# Patient Record
Sex: MALE | Race: BLACK OR AFRICAN AMERICAN | NOT HISPANIC OR LATINO | Employment: OTHER | URBAN - METROPOLITAN AREA
[De-identification: names, ages, dates, MRNs, and addresses within clinical notes are randomized per-mention and may not be internally consistent; named-entity substitution may affect disease eponyms.]

---

## 2020-02-21 ENCOUNTER — HISTORICAL (OUTPATIENT)
Dept: ADMINISTRATIVE | Facility: HOSPITAL | Age: 55
End: 2020-02-21

## 2022-04-10 ENCOUNTER — HISTORICAL (OUTPATIENT)
Dept: ADMINISTRATIVE | Facility: HOSPITAL | Age: 57
End: 2022-04-10

## 2022-04-26 VITALS
DIASTOLIC BLOOD PRESSURE: 77 MMHG | WEIGHT: 287.94 LBS | SYSTOLIC BLOOD PRESSURE: 128 MMHG | BODY MASS INDEX: 45.19 KG/M2 | HEIGHT: 67 IN

## 2022-05-03 NOTE — HISTORICAL [facility] CERNER
This is a historical note converted from Scott. Formatting and pictures may have been removed.  Please reference Scott for original formatting and attached multimedia. Chief Complaint  Referral Rt knee OA  History of Present Illness  55 Years??Male?non-smoker?p[facility]?for?initial visit?for?right?knee pain.? Patient points to ?diffusely.  Onset: ?insidious over years  Current pain level: 7/10 (rated as?moderate) ?medication not helping?. Quality described as aching and throbbing radiating down to his feet.  Modifying Factors: ?worse with/after activity;?unchanged with rest;??stiffness after immobilization??stiffness improved with less than 30 minutes of activity  Previous treatment:?conservative treatment for at least 3 months with HEP/medications, CSI x 4 over the last 2 years, last CSI 1/09/2020, [facility] and [facility] to lose weight to <250lbs. Patient did PT and aquatic exercises in 2016 with no improvement of pain.  Previous injuries:?remote history of trauma?with hx of meniscus tears on the right knee  Associated Symptoms:?crepitus/grinding; ?numbness/tingling radiating down?the feet;??no swelling;?no skin changes;?no weakness;?moderate decrease in ROM  Activity:?sedentary; full ADLs;?pain occasionally limits daily activity (moderate)  Family History:?family history of arthritis  Review of Systems  General: No? change in appetite, thirst, fever or chills.?  HEENT: No headache, blurred vision, runny nose or sore throat  Cardiology : No chest pain, palpitations or shortness of breath.?  GI: No nausea or vomiting, diarrhea or constipation.?  Heme: No pallor, bruising or bleeding.?  MS: Per HPI  Neurology: ?No headache or loss of consciousness.?  Psych: No depression, anxiety, or mood swing.  Physical Exam  General: well developed; well nourished; cooperative  PSYCH: alert and oriented with?appropriate mood and affect  SKIN: inspection  and palpation of skin and soft tissue normal; no scars noted on upper/lower extremities  CV: vascular integrity noted; +2 symmetrical pulses, no edema  NEURO: sensation intact by light touch; DTRs +2 bilateral and symmetrical  LYMPH: no LAD noted  ?   MSK:right knee  Inspection:?limping;??partial weight bearing;??normal?alignment;??no swelling;?no?erythema;?no?bruising;?atrophy/deconditioning of quadriceps  Palpation:?non-tender; ?Crepitus:?Positive  ROM:?  Active Extension/Flexion (0-140):?0-100  Passive?Extension/Flexion (0-140):?0-105  Strength:? Flexion??5/5, Extension??5/5  Special Tests:  Ballotable Effusion: ?Negative; Fluid Wave:?Negative  Anterior Drawer:Negative;? Lachman:?Negative;? Pivot Shift:Negative  Posterior Sag Sign:?Negative;? Posterior Drawer:?Negative; Dial Test:?not tested  Varus Stress:?LCL stable at 0 and 30 degrees  Valgus Stress:?MCL?stable at 0 and 30 degrees  Patellar Grind: ?Positive  Romina:?Negative?; Apleys Compression:?Negative; Thessaly: ?Negative  Noble Compression:not tested; Geoffrey:not tested  Neurovascular:?Intact; 2+?distal pulse, sensation intact to light touch  ?  Assessment/Plan  1.?Primary osteoarthritis of right knee?M17.11  DX: ?right?knee osteoarthritis  Discussed with patient diagnosis and treatment recommendations.? Handout given.  Imaging:?radiological studies ordered and independently reviewed with?moderate narrowing?lateral joint?space with diffused?subchrondral sclerosis; discussed with patient; pending radiologist interpretation  Treatment plan: conservative treatment to include oral glucosamine 1500 mg/day; topical capsaicin and topical NSAID as needed; hot or cold therapy; adequate vitamin C/D intake  Procedure:?discussed CSI vs VS injections as treatment options but?CSI does?not last?more than?1 month.  Activity:?Activity as tolerated; HEP to included aerobic conditioning with non-painful activity and ROM/STG exercises; proper footwear; assistive device to  avoid limping  Therapy:?formal PT/OT ordered  Medication:?OTC or?prescription?NSAIDs; medication precautions given  RTC: 1 month, patient is instructed to attend PT?prior to next appointment. ?  Additional work-up:?none at this time.  Ordered:  naproxen, 500 mg = 1 tab(s), Oral, BID, # 60 tab(s), 1 Refill(s), Pharmacy: [facility]/pharmacy #[id number], 170.3, cm, Height/Length Dosing, 02/21/20 9:06:00 CST, 130.6, kg, Weight Dosing, 02/21/20 9:06:00 CST  Clinic Follow up, *Est. 03/21/20 3:00:00 CDT, after PT and possible hyalgan, Order for future visit, Primary osteoarthritis of right knee  Morbid obesity, [facility]  XR Knee Right 4 or More Views, Routine, 02/21/20 9:03:00 CST, Pain, None, Ambulatory, Rad Type, Primary osteoarthritis of right knee, Not Scheduled, 02/21/20 9:03:00 CST  ?  2.?Morbid obesity?E66.01  BMI= 45  Recommend to discuss with PCP about weight management for overall sarah and comorbidities.  Ordered:  Clinic Follow up, *Est. 03/21/20 3:00:00 CDT, after PT and possible hyalgan, Order for future visit, Primary osteoarthritis of right knee  Morbid obesity, [facility]  ?  3. HTN:  Recommend adherence with PCP for chronic conditions as well as pain management.   Medications  naproxen 500 mg oral tablet, 500 mg= 1 tab(s), Oral, BID  UltrAm 50 mg oral tablet, 50 mg= 1 tab(s), Oral, q4hr  Problems/Past Medical History  PTSD, psychiatric illness, HTN, neuropathy, varicoses, and morbidly obesity, chronic back and shoulder pain,?pes?planus      VS T-36.7, P-69, /77   Trey Hancock?was seen in?[facility].??Patient seen and evaluated at the time of the visit.?History of Present Illness, Physical Exam, and Assessment and Plan reviewed. Treatment plan is reasonable and appropriate. Compliance with treatment recommendations is important.??Radiology images independently reviewed and agree with radiologist interpretation.?- Holley Shannon MD MPH

## 2022-11-18 ENCOUNTER — HOSPITAL ENCOUNTER (EMERGENCY)
Facility: HOSPITAL | Age: 57
Discharge: PSYCHIATRIC OR SUBSTANCE USE FACILITY | End: 2022-11-19
Attending: EMERGENCY MEDICINE
Payer: COMMERCIAL

## 2022-11-18 DIAGNOSIS — Z20.822 2019 NOVEL CORONAVIRUS NOT DETECTED: ICD-10-CM

## 2022-11-18 DIAGNOSIS — F23 ACUTE PSYCHOSIS: Primary | ICD-10-CM

## 2022-11-18 LAB
ALBUMIN SERPL-MCNC: 3.7 GM/DL (ref 3.5–5)
ALBUMIN/GLOB SERPL: 1.2 RATIO (ref 1.1–2)
ALP SERPL-CCNC: 72 UNIT/L (ref 40–150)
ALT SERPL-CCNC: 22 UNIT/L (ref 0–55)
AMPHET UR QL SCN: NEGATIVE
APAP SERPL-MCNC: <17.4 UG/ML (ref 17.4–30)
APPEARANCE UR: CLEAR
AST SERPL-CCNC: 28 UNIT/L (ref 5–34)
BACTERIA #/AREA URNS AUTO: NORMAL /HPF
BARBITURATE SCN PRESENT UR: NEGATIVE
BASOPHILS # BLD AUTO: 0.04 X10(3)/MCL (ref 0–0.2)
BASOPHILS NFR BLD AUTO: 0.9 %
BENZODIAZ UR QL SCN: POSITIVE
BILIRUB UR QL STRIP.AUTO: NEGATIVE MG/DL
BILIRUBIN DIRECT+TOT PNL SERPL-MCNC: 0.3 MG/DL
BUN SERPL-MCNC: 12.7 MG/DL (ref 8.4–25.7)
CALCIUM SERPL-MCNC: 9 MG/DL (ref 8.4–10.2)
CANNABINOIDS UR QL SCN: NEGATIVE
CHLORIDE SERPL-SCNC: 109 MMOL/L (ref 98–107)
CK SERPL-CCNC: 489 U/L (ref 30–200)
CO2 SERPL-SCNC: 21 MMOL/L (ref 22–29)
COCAINE UR QL SCN: NEGATIVE
COLOR UR AUTO: YELLOW
CREAT SERPL-MCNC: 1.01 MG/DL (ref 0.73–1.18)
EOSINOPHIL # BLD AUTO: 0.07 X10(3)/MCL (ref 0–0.9)
EOSINOPHIL NFR BLD AUTO: 1.5 %
ERYTHROCYTE [DISTWIDTH] IN BLOOD BY AUTOMATED COUNT: 14 % (ref 11.5–17)
ETHANOL SERPL-MCNC: <10 MG/DL
FENTANYL UR QL SCN: NEGATIVE
GFR SERPLBLD CREATININE-BSD FMLA CKD-EPI: >60 MLS/MIN/1.73/M2
GLOBULIN SER-MCNC: 3.1 GM/DL (ref 2.4–3.5)
GLUCOSE SERPL-MCNC: 105 MG/DL (ref 74–100)
GLUCOSE UR QL STRIP.AUTO: NEGATIVE MG/DL
HCT VFR BLD AUTO: 34.1 % (ref 42–52)
HGB BLD-MCNC: 11 GM/DL (ref 14–18)
IMM GRANULOCYTES # BLD AUTO: 0.01 X10(3)/MCL (ref 0–0.04)
IMM GRANULOCYTES NFR BLD AUTO: 0.2 %
KETONES UR QL STRIP.AUTO: NEGATIVE MG/DL
LEUKOCYTE ESTERASE UR QL STRIP.AUTO: NEGATIVE UNIT/L
LYMPHOCYTES # BLD AUTO: 1.05 X10(3)/MCL (ref 0.6–4.6)
LYMPHOCYTES NFR BLD AUTO: 22.9 %
MCH RBC QN AUTO: 30.6 PG (ref 27–31)
MCHC RBC AUTO-ENTMCNC: 32.3 MG/DL (ref 33–36)
MCV RBC AUTO: 94.7 FL (ref 80–94)
MDMA UR QL SCN: NEGATIVE
MONOCYTES # BLD AUTO: 0.53 X10(3)/MCL (ref 0.1–1.3)
MONOCYTES NFR BLD AUTO: 11.5 %
NEUTROPHILS # BLD AUTO: 2.9 X10(3)/MCL (ref 2.1–9.2)
NEUTROPHILS NFR BLD AUTO: 63 %
NITRITE UR QL STRIP.AUTO: NEGATIVE
NRBC BLD AUTO-RTO: 0 %
OPIATES UR QL SCN: POSITIVE
PCP UR QL: NEGATIVE
PH UR STRIP.AUTO: 6 [PH]
PH UR: 6 [PH] (ref 3–11)
PLATELET # BLD AUTO: 159 X10(3)/MCL (ref 130–400)
PMV BLD AUTO: 9.2 FL (ref 7.4–10.4)
POTASSIUM SERPL-SCNC: 3.7 MMOL/L (ref 3.5–5.1)
PROT SERPL-MCNC: 6.8 GM/DL (ref 6.4–8.3)
PROT UR QL STRIP.AUTO: NEGATIVE MG/DL
RBC # BLD AUTO: 3.6 X10(6)/MCL (ref 4.7–6.1)
RBC #/AREA URNS AUTO: <5 /HPF
RBC UR QL AUTO: NEGATIVE UNIT/L
SARS-COV-2 RDRP RESP QL NAA+PROBE: NEGATIVE
SODIUM SERPL-SCNC: 140 MMOL/L (ref 136–145)
SP GR UR STRIP.AUTO: 1.01 (ref 1–1.03)
SPECIFIC GRAVITY, URINE AUTO (.000) (OHS): 1.01 (ref 1–1.03)
SQUAMOUS #/AREA URNS AUTO: <5 /HPF
TSH SERPL-ACNC: 0.35 UIU/ML (ref 0.35–4.94)
UROBILINOGEN UR STRIP-ACNC: 0.2 MG/DL
WBC # SPEC AUTO: 4.6 X10(3)/MCL (ref 4.5–11.5)
WBC #/AREA URNS AUTO: <5 /HPF

## 2022-11-18 PROCEDURE — 80053 COMPREHEN METABOLIC PANEL: CPT | Performed by: EMERGENCY MEDICINE

## 2022-11-18 PROCEDURE — 81003 URINALYSIS AUTO W/O SCOPE: CPT | Performed by: EMERGENCY MEDICINE

## 2022-11-18 PROCEDURE — 80143 DRUG ASSAY ACETAMINOPHEN: CPT | Performed by: EMERGENCY MEDICINE

## 2022-11-18 PROCEDURE — 93010 EKG 12-LEAD: ICD-10-PCS | Mod: ,,, | Performed by: INTERNAL MEDICINE

## 2022-11-18 PROCEDURE — 25000003 PHARM REV CODE 250: Performed by: STUDENT IN AN ORGANIZED HEALTH CARE EDUCATION/TRAINING PROGRAM

## 2022-11-18 PROCEDURE — 99285 EMERGENCY DEPT VISIT HI MDM: CPT | Mod: 25

## 2022-11-18 PROCEDURE — 93005 ELECTROCARDIOGRAM TRACING: CPT

## 2022-11-18 PROCEDURE — 82077 ASSAY SPEC XCP UR&BREATH IA: CPT | Performed by: EMERGENCY MEDICINE

## 2022-11-18 PROCEDURE — 93010 ELECTROCARDIOGRAM REPORT: CPT | Mod: ,,, | Performed by: INTERNAL MEDICINE

## 2022-11-18 PROCEDURE — 84443 ASSAY THYROID STIM HORMONE: CPT | Performed by: EMERGENCY MEDICINE

## 2022-11-18 PROCEDURE — 82550 ASSAY OF CK (CPK): CPT | Performed by: EMERGENCY MEDICINE

## 2022-11-18 PROCEDURE — 85025 COMPLETE CBC W/AUTO DIFF WBC: CPT | Performed by: EMERGENCY MEDICINE

## 2022-11-18 PROCEDURE — 80307 DRUG TEST PRSMV CHEM ANLYZR: CPT | Performed by: EMERGENCY MEDICINE

## 2022-11-18 PROCEDURE — 96372 THER/PROPH/DIAG INJ SC/IM: CPT | Performed by: EMERGENCY MEDICINE

## 2022-11-18 PROCEDURE — 87635 SARS-COV-2 COVID-19 AMP PRB: CPT | Performed by: EMERGENCY MEDICINE

## 2022-11-18 PROCEDURE — 63600175 PHARM REV CODE 636 W HCPCS: Performed by: EMERGENCY MEDICINE

## 2022-11-18 PROCEDURE — 81001 URINALYSIS AUTO W/SCOPE: CPT | Mod: 59 | Performed by: EMERGENCY MEDICINE

## 2022-11-18 RX ORDER — AMITRIPTYLINE HYDROCHLORIDE 100 MG/1
100 TABLET ORAL
COMMUNITY

## 2022-11-18 RX ORDER — ETODOLAC 200 MG/1
200 CAPSULE ORAL 2 TIMES DAILY
COMMUNITY

## 2022-11-18 RX ORDER — TRAMADOL HYDROCHLORIDE 50 MG/1
TABLET ORAL
COMMUNITY
Start: 2022-05-30

## 2022-11-18 RX ORDER — BUSPIRONE HYDROCHLORIDE 10 MG/1
TABLET ORAL
COMMUNITY

## 2022-11-18 RX ORDER — PROMETHAZINE HYDROCHLORIDE AND DEXTROMETHORPHAN HYDROBROMIDE 6.25; 15 MG/5ML; MG/5ML
5 SYRUP ORAL EVERY 4 HOURS PRN
COMMUNITY
Start: 2022-10-27

## 2022-11-18 RX ORDER — TALC
6 POWDER (GRAM) TOPICAL NIGHTLY PRN
Status: DISCONTINUED | OUTPATIENT
Start: 2022-11-18 | End: 2022-11-19 | Stop reason: HOSPADM

## 2022-11-18 RX ORDER — CLONAZEPAM 2 MG/1
2 TABLET ORAL 2 TIMES DAILY PRN
COMMUNITY

## 2022-11-18 RX ORDER — CITALOPRAM 40 MG/1
40 TABLET, FILM COATED ORAL EVERY MORNING
COMMUNITY

## 2022-11-18 RX ORDER — GUAIFENESIN 600 MG/1
TABLET, EXTENDED RELEASE ORAL
COMMUNITY
Start: 2022-10-27

## 2022-11-18 RX ORDER — FLUTICASONE PROPIONATE 50 MCG
SPRAY, SUSPENSION (ML) NASAL
COMMUNITY
Start: 2022-10-27

## 2022-11-18 RX ORDER — SIMVASTATIN 10 MG/1
TABLET, FILM COATED ORAL
COMMUNITY

## 2022-11-18 RX ORDER — ZIPRASIDONE MESYLATE 20 MG/ML
20 INJECTION, POWDER, LYOPHILIZED, FOR SOLUTION INTRAMUSCULAR
Status: COMPLETED | OUTPATIENT
Start: 2022-11-18 | End: 2022-11-18

## 2022-11-18 RX ORDER — ZIPRASIDONE MESYLATE 20 MG/ML
20 INJECTION, POWDER, LYOPHILIZED, FOR SOLUTION INTRAMUSCULAR
Status: DISCONTINUED | OUTPATIENT
Start: 2022-11-18 | End: 2022-11-18

## 2022-11-18 RX ADMIN — MELATONIN TAB 3 MG 6 MG: 3 TAB at 09:11

## 2022-11-18 RX ADMIN — ZIPRASIDONE MESYLATE 20 MG: 20 INJECTION, POWDER, LYOPHILIZED, FOR SOLUTION INTRAMUSCULAR at 09:11

## 2022-11-18 NOTE — ED PROVIDER NOTES
Encounter Date: 11/18/2022    SCRIBE #1 NOTE: I, Maia Dunlap, am scribing for, and in the presence of,  Robbie Jacobs III, MD. I have scribed the following portions of the note - Other sections scribed: hpi, ros, pe.     History     Chief Complaint   Patient presents with    Altered Mental Status     Family reports AMS since this morning. Pt found in car, unable to answer questions appropriately. Resisting help on EMS arrival. Unknown drugs found on patient. Received 500 mg Ketamine in route via EMS. GCS 6 on arrival to ED.      56 y/o male with history of severe mental health problems presenting to the ED due to altered mental status. Patient's family reports altered mental status since this morning; states he hasn't slept in 9 days. He locked himself inside of a car and resisted help from EMS. Patient was reportedly paranoid by EMS but they are unsure of drug use. Per EMS, he was given 500 mg of Ketamine en route. Patient is GCS 6 upon arrival at ED.     The history is provided by the EMS personnel and a relative. No  was used.   Altered Mental Status    Review of patient's allergies indicates:  Not on File  Past Medical History:   Diagnosis Date    Depression     Hypertension     PTSD (post-traumatic stress disorder)      History reviewed. No pertinent surgical history.  History reviewed. No pertinent family history.     Review of Systems   Unable to perform ROS: Mental status change     Physical Exam     Initial Vitals [11/18/22 0839]   BP Pulse Resp Temp SpO2   (!) 156/90 102 14 96.4 °F (35.8 °C) 100 %      MAP       --         Physical Exam    Nursing note and vitals reviewed.  Constitutional: He appears well-developed and well-nourished.   Makes eye contact.   Uses inappropriate words.    HENT:   Head: Normocephalic and atraumatic.   Eyes: EOM are normal. Pupils are equal, round, and reactive to light.   Neck:   Normal range of motion.  Cardiovascular:  Normal rate, regular  rhythm, normal heart sounds and intact distal pulses.           Pulmonary/Chest: Breath sounds normal.   Abdominal: Abdomen is soft. Bowel sounds are normal.   Musculoskeletal:         General: Normal range of motion.      Cervical back: Normal range of motion.     Neurological: He has normal strength. GCS score is 15.   Skin: Skin is warm and dry. Capillary refill takes less than 2 seconds.   Psychiatric: Judgment normal.       ED Course   Critical Care    Date/Time: 11/18/2022 12:16 PM  Performed by: Robbie Jacobs III, MD  Authorized by: Robbie Jacobs III, MD   Direct patient critical care time: 45 minutes  Documentation critical care time: 5 minutes  Consulting other physicians critical care time: 5 minutes  Total critical care time (exclusive of procedural time) : 55 minutes  Critical care was necessary to treat or prevent imminent or life-threatening deterioration of the following conditions: Psychosis.  Critical care was time spent personally by me on the following activities: discussions with consultants, examination of patient, re-evaluation of patient's condition, review of old charts, ordering and review of laboratory studies, ordering and performing treatments and interventions, evaluation of patient's response to treatment and obtaining history from patient or surrogate.      Labs Reviewed   COMPREHENSIVE METABOLIC PANEL - Abnormal; Notable for the following components:       Result Value    Chloride 109 (*)     Carbon Dioxide 21 (*)     Glucose Level 105 (*)     All other components within normal limits   ACETAMINOPHEN LEVEL - Abnormal; Notable for the following components:    Acetaminophen Level <17.4 (*)     All other components within normal limits   CBC WITH DIFFERENTIAL - Abnormal; Notable for the following components:    RBC 3.60 (*)     Hgb 11.0 (*)     Hct 34.1 (*)     MCV 94.7 (*)     MCHC 32.3 (*)     All other components within normal limits   TSH - Normal   ALCOHOL,MEDICAL (ETHANOL)  - Normal   CBC W/ AUTO DIFFERENTIAL    Narrative:     The following orders were created for panel order CBC auto differential.  Procedure                               Abnormality         Status                     ---------                               -----------         ------                     CBC with Differential[[id number]]        Abnormal            Final result                 Please view results for these tests on the individual orders.   URINALYSIS, REFLEX TO URINE CULTURE   DRUG SCREEN, URINE (BEAKER)   SARS CORONAVIRUS 2 ANTIGEN POCT, MANUAL READ          Imaging Results              CT Head Without Contrast (Final result)  Result time 11/18/22 10:42:59      Final result by Suni Mayes MD (11/18/22 10:42:59)                   Impression:      1. No acute intracranial abnormality.  2. Chronic microvascular ischemic changes.      Electronically signed by: Suni Mayes  Date:    11/18/2022  Time:    10:42               Narrative:    EXAMINATION:  CT HEAD WITHOUT CONTRAST    CLINICAL HISTORY:  Mental status change, unknown cause;    TECHNIQUE:  Axial scans were obtained from skull base to the vertex.    Coronal and sagittal reconstructions obtained from the axial data.    Automatic exposure control was utilized to limit radiation dose.    Contrast: None    Radiation Dose:    Total DLP: 1144 mGy*cm    COMPARISON:  None    FINDINGS:  There is no acute intracranial hemorrhage or edema. The gray-white matter differentiation is preserved.  Patchy hypodensities in the subcortical and periventricular white matter and left basal ganglia likely represent chronic microvascular ischemic changes.    There is no mass effect or midline shift.  There is diffuse parenchymal volume loss.  The basal cisterns are patent. There is no abnormal extra-axial fluid collection.    The calvarium and skull base are intact.  There is mild scattered paranasal sinus mucosal thickening.                                        Medications   ziprasidone injection 20 mg (has no administration in time range)   ziprasidone injection 20 mg (20 mg Intramuscular Given 11/18/22 0945)     Medical Decision Making:   Clinical Tests:   Lab Tests: Ordered and Reviewed  ED Management:  Patient came in after getting ketamine still combative was given 20 mg non        Scribe Attestation:   Scribe #1: I performed the above scribed service and the documentation accurately describes the services I performed. I attest to the accuracy of the note.    Attending Attestation:           Physician Attestation for Scribe:  Physician Attestation Statement for Scribe #1: I, reviewed documentation, as scribed by Maia Dunlap in my presence, and it is both accurate and complete.              Medically cleared for psychiatry placement: 11/18/2022 12:18 PM         Clinical Impression:   Final diagnoses:  [F23] Acute psychosis (Primary)      ED Disposition Condition    Transfer to Psych Facility Stable          ED Prescriptions    None       Follow-up Information    None          Robbie Jacobs III, MD  11/18/22 9134     Admitted

## 2022-11-19 VITALS
BODY MASS INDEX: 43.01 KG/M2 | DIASTOLIC BLOOD PRESSURE: 80 MMHG | RESPIRATION RATE: 16 BRPM | SYSTOLIC BLOOD PRESSURE: 120 MMHG | HEART RATE: 74 BPM | TEMPERATURE: 98 F | WEIGHT: 275 LBS | OXYGEN SATURATION: 98 %

## 2022-11-19 LAB — CK SERPL-CCNC: 335 U/L (ref 30–200)

## 2022-11-19 PROCEDURE — 63600175 PHARM REV CODE 636 W HCPCS: Performed by: EMERGENCY MEDICINE

## 2022-11-19 PROCEDURE — 96372 THER/PROPH/DIAG INJ SC/IM: CPT | Performed by: EMERGENCY MEDICINE

## 2022-11-19 PROCEDURE — 25000003 PHARM REV CODE 250: Performed by: STUDENT IN AN ORGANIZED HEALTH CARE EDUCATION/TRAINING PROGRAM

## 2022-11-19 PROCEDURE — 82550 ASSAY OF CK (CPK): CPT | Performed by: STUDENT IN AN ORGANIZED HEALTH CARE EDUCATION/TRAINING PROGRAM

## 2022-11-19 RX ORDER — ALPRAZOLAM 0.5 MG/1
2 TABLET ORAL
Status: COMPLETED | OUTPATIENT
Start: 2022-11-19 | End: 2022-11-19

## 2022-11-19 RX ORDER — HALOPERIDOL 5 MG/ML
5 INJECTION INTRAMUSCULAR
Status: COMPLETED | OUTPATIENT
Start: 2022-11-19 | End: 2022-11-19

## 2022-11-19 RX ADMIN — ALPRAZOLAM 2 MG: 0.5 TABLET ORAL at 12:11

## 2022-11-19 RX ADMIN — HALOPERIDOL LACTATE 5 MG: 5 INJECTION, SOLUTION INTRAMUSCULAR at 01:11

## 2023-11-29 DIAGNOSIS — M26.69 OTHER SPECIFIED DISORDERS OF TEMPOROMANDIBULAR JOINT: Primary | ICD-10-CM

## 2024-07-05 DIAGNOSIS — D64.9 ANEMIA: Primary | ICD-10-CM

## 2024-07-10 DIAGNOSIS — K42.9 UMBILICAL HERNIA: Primary | ICD-10-CM

## 2024-08-15 ENCOUNTER — HOSPITAL ENCOUNTER (EMERGENCY)
Facility: HOSPITAL | Age: 59
Discharge: NURSING, REHAB OR LONG-TERM CARE | End: 2024-08-15
Attending: EMERGENCY MEDICINE
Payer: OTHER GOVERNMENT

## 2024-08-15 VITALS
WEIGHT: 230 LBS | RESPIRATION RATE: 19 BRPM | OXYGEN SATURATION: 96 % | HEART RATE: 100 BPM | TEMPERATURE: 98 F | HEIGHT: 66 IN | SYSTOLIC BLOOD PRESSURE: 125 MMHG | BODY MASS INDEX: 36.96 KG/M2 | DIASTOLIC BLOOD PRESSURE: 92 MMHG

## 2024-08-15 DIAGNOSIS — F19.10 POLYSUBSTANCE ABUSE: Primary | ICD-10-CM

## 2024-08-15 DIAGNOSIS — Z00.8 MEDICAL CLEARANCE FOR PSYCHIATRIC ADMISSION: ICD-10-CM

## 2024-08-15 LAB
ALBUMIN SERPL-MCNC: 3.5 G/DL (ref 3.5–5)
ALBUMIN/GLOB SERPL: 0.8 RATIO (ref 1.1–2)
ALP SERPL-CCNC: 85 UNIT/L (ref 40–150)
ALT SERPL-CCNC: 37 UNIT/L (ref 0–55)
AMPHET UR QL SCN: NEGATIVE
ANION GAP SERPL CALC-SCNC: 13 MEQ/L
APAP SERPL-MCNC: <3 UG/ML (ref 10–30)
AST SERPL-CCNC: 52 UNIT/L (ref 5–34)
BACTERIA #/AREA URNS AUTO: ABNORMAL /HPF
BARBITURATE SCN PRESENT UR: NEGATIVE
BASOPHILS # BLD AUTO: 0.06 X10(3)/MCL
BASOPHILS NFR BLD AUTO: 1.1 %
BENZODIAZ UR QL SCN: POSITIVE
BILIRUB SERPL-MCNC: 0.3 MG/DL
BILIRUB UR QL STRIP.AUTO: NEGATIVE
BUN SERPL-MCNC: 5.8 MG/DL (ref 8.4–25.7)
CALCIUM SERPL-MCNC: 9.3 MG/DL (ref 8.4–10.2)
CANNABINOIDS UR QL SCN: NEGATIVE
CHLORIDE SERPL-SCNC: 101 MMOL/L (ref 98–107)
CLARITY UR: CLEAR
CO2 SERPL-SCNC: 24 MMOL/L (ref 22–29)
COCAINE UR QL SCN: POSITIVE
COLOR UR AUTO: YELLOW
CREAT SERPL-MCNC: 0.85 MG/DL (ref 0.73–1.18)
CREAT/UREA NIT SERPL: 7
EOSINOPHIL # BLD AUTO: 0.09 X10(3)/MCL (ref 0–0.9)
EOSINOPHIL NFR BLD AUTO: 1.6 %
ERYTHROCYTE [DISTWIDTH] IN BLOOD BY AUTOMATED COUNT: 14.8 % (ref 11.5–17)
ETHANOL SERPL-MCNC: <10 MG/DL
FENTANYL UR QL SCN: NEGATIVE
GFR SERPLBLD CREATININE-BSD FMLA CKD-EPI: >60 ML/MIN/1.73/M2
GLOBULIN SER-MCNC: 4.2 GM/DL (ref 2.4–3.5)
GLUCOSE SERPL-MCNC: 97 MG/DL (ref 74–100)
GLUCOSE UR QL STRIP: NORMAL
HCT VFR BLD AUTO: 33.7 % (ref 42–52)
HGB BLD-MCNC: 10.8 G/DL (ref 14–18)
HGB UR QL STRIP: NEGATIVE
HYALINE CASTS #/AREA URNS LPF: ABNORMAL /LPF
IMM GRANULOCYTES # BLD AUTO: 0.01 X10(3)/MCL (ref 0–0.04)
IMM GRANULOCYTES NFR BLD AUTO: 0.2 %
KETONES UR QL STRIP: NEGATIVE
LEUKOCYTE ESTERASE UR QL STRIP: NEGATIVE
LYMPHOCYTES # BLD AUTO: 1.64 X10(3)/MCL (ref 0.6–4.6)
LYMPHOCYTES NFR BLD AUTO: 28.8 %
MCH RBC QN AUTO: 30.4 PG (ref 27–31)
MCHC RBC AUTO-ENTMCNC: 32 G/DL (ref 33–36)
MCV RBC AUTO: 94.9 FL (ref 80–94)
MDMA UR QL SCN: NEGATIVE
MONOCYTES # BLD AUTO: 0.71 X10(3)/MCL (ref 0.1–1.3)
MONOCYTES NFR BLD AUTO: 12.5 %
MUCOUS THREADS URNS QL MICRO: ABNORMAL /LPF
NEUTROPHILS # BLD AUTO: 3.18 X10(3)/MCL (ref 2.1–9.2)
NEUTROPHILS NFR BLD AUTO: 55.8 %
NITRITE UR QL STRIP: NEGATIVE
NRBC BLD AUTO-RTO: 0 %
OPIATES UR QL SCN: POSITIVE
PCP UR QL: NEGATIVE
PH UR STRIP: 6 [PH]
PH UR: 6 [PH] (ref 3–11)
PLATELET # BLD AUTO: 258 X10(3)/MCL (ref 130–400)
PMV BLD AUTO: 8.6 FL (ref 7.4–10.4)
POTASSIUM SERPL-SCNC: 4.2 MMOL/L (ref 3.5–5.1)
PROT SERPL-MCNC: 7.7 GM/DL (ref 6.4–8.3)
PROT UR QL STRIP: ABNORMAL
RBC # BLD AUTO: 3.55 X10(6)/MCL (ref 4.7–6.1)
RBC #/AREA URNS AUTO: ABNORMAL /HPF
SALICYLATES SERPL-MCNC: <5 MG/DL (ref 15–30)
SARS-COV-2 RDRP RESP QL NAA+PROBE: NEGATIVE
SODIUM SERPL-SCNC: 138 MMOL/L (ref 136–145)
SP GR UR STRIP.AUTO: 1.02 (ref 1–1.03)
SPECIFIC GRAVITY, URINE AUTO (.000) (OHS): 1.02 (ref 1–1.03)
SQUAMOUS #/AREA URNS LPF: ABNORMAL /HPF
TSH SERPL-ACNC: 0.27 UIU/ML (ref 0.35–4.94)
UROBILINOGEN UR STRIP-ACNC: 2
WBC # BLD AUTO: 5.69 X10(3)/MCL (ref 4.5–11.5)
WBC #/AREA URNS AUTO: ABNORMAL /HPF

## 2024-08-15 PROCEDURE — 80143 DRUG ASSAY ACETAMINOPHEN: CPT

## 2024-08-15 PROCEDURE — 93010 ELECTROCARDIOGRAM REPORT: CPT | Mod: ,,, | Performed by: INTERNAL MEDICINE

## 2024-08-15 PROCEDURE — 85025 COMPLETE CBC W/AUTO DIFF WBC: CPT

## 2024-08-15 PROCEDURE — U0002 COVID-19 LAB TEST NON-CDC: HCPCS | Performed by: EMERGENCY MEDICINE

## 2024-08-15 PROCEDURE — 84443 ASSAY THYROID STIM HORMONE: CPT

## 2024-08-15 PROCEDURE — 99285 EMERGENCY DEPT VISIT HI MDM: CPT | Mod: 25

## 2024-08-15 PROCEDURE — 80179 DRUG ASSAY SALICYLATE: CPT

## 2024-08-15 PROCEDURE — 82077 ASSAY SPEC XCP UR&BREATH IA: CPT

## 2024-08-15 PROCEDURE — 80053 COMPREHEN METABOLIC PANEL: CPT

## 2024-08-15 PROCEDURE — 81001 URINALYSIS AUTO W/SCOPE: CPT

## 2024-08-15 PROCEDURE — 25000003 PHARM REV CODE 250: Performed by: EMERGENCY MEDICINE

## 2024-08-15 PROCEDURE — 80307 DRUG TEST PRSMV CHEM ANLYZR: CPT

## 2024-08-15 RX ORDER — ONDANSETRON 4 MG/1
4 TABLET, ORALLY DISINTEGRATING ORAL
Status: COMPLETED | OUTPATIENT
Start: 2024-08-15 | End: 2024-08-15

## 2024-08-15 RX ORDER — LORAZEPAM 1 MG/1
2 TABLET ORAL
Status: COMPLETED | OUTPATIENT
Start: 2024-08-15 | End: 2024-08-15

## 2024-08-15 RX ADMIN — ONDANSETRON 4 MG: 4 TABLET, ORALLY DISINTEGRATING ORAL at 07:08

## 2024-08-15 RX ADMIN — LORAZEPAM 2 MG: 1 TABLET ORAL at 07:08

## 2024-08-15 NOTE — FIRST PROVIDER EVALUATION
"Medical screening examination initiated.  I have conducted a focused provider triage encounter, findings are as follows:    Brief history of present illness:  59 year old male presents to ER wanting detox from cocaine, norco, and valium. States that he last used these substances 2 days ago. Denies SI/HI    Vitals:    08/15/24 1851   BP: 124/88   BP Location: Left arm   Pulse: 105   Resp: 19   Temp: 98.2 °F (36.8 °C)   TempSrc: Oral   SpO2: 98%   Weight: 104.3 kg (230 lb)   Height: 5' 6" (1.676 m)       Pertinent physical exam:  Awake and alert, NAD    Brief workup plan:  Labs    Preliminary workup initiated; this workup will be continued and followed by the physician or advanced practice provider that is assigned to the patient when roomed.  "

## 2024-08-16 LAB
OHS QRS DURATION: 98 MS
OHS QTC CALCULATION: 456 MS

## 2024-08-16 NOTE — ED PROVIDER NOTES
Encounter Date: 8/15/2024    SCRIBE #1 NOTE: I, Osbaldo Inman, am scribing for, and in the presence of,  Sandy Lal MD. I have scribed the following portions of the note - Other sections scribed: HPI, ROS, PE.       History     Chief Complaint   Patient presents with    Addiction Problem     Patient requesting to detox from cocaine, norco, and valium. Last took them 2 days ago. Denies any hallucinations, SI, or HI. Has been off psych medications for 2 weeks.      58 y/o male with a hx of substance abuse, depression, and HTN presents to the ED for medical clearance for drug detox. Pt states he is addicted to hydrocodone, valium, and cocaine, last used 3 days ago. Pt notes he wants help for his addiction. Pt relative at bedside states they have a bed ready to [facility] in [place] but they require medical clearance before they are able to take the pt. Pt notes he has decreased appetite, nausea, and vomiting. Pt denies suicidal or homicidal ideations.     The history is provided by the patient and medical records. No  was used.     Review of patient's allergies indicates:  No Known Allergies  Past Medical History:   Diagnosis Date    Depression     Hypertension     PTSD (post-traumatic stress disorder)      No past surgical history on file.  No family history on file.     Review of Systems   Constitutional:  Positive for appetite change.   Gastrointestinal:  Positive for nausea and vomiting.   Psychiatric/Behavioral:  Negative for suicidal ideas.         No homicidal ideations       Physical Exam     Initial Vitals [08/15/24 1851]   BP Pulse Resp Temp SpO2   124/88 105 19 98.2 °F (36.8 °C) 98 %      MAP       --         Physical Exam    Nursing note and vitals reviewed.  Constitutional: He appears well-developed and well-nourished. He is not diaphoretic. No distress.   HENT:   Head: Normocephalic and atraumatic.   Right Ear: External ear normal.   Left Ear: External ear normal.   Eyes:  Conjunctivae are normal.   Neck: Neck supple.   Normal range of motion.  Cardiovascular:  Normal rate.           Pulmonary/Chest: No respiratory distress.   Abdominal: He exhibits no distension.   Musculoskeletal:         General: Normal range of motion.      Cervical back: Normal range of motion and neck supple.     Neurological: He is alert and oriented to person, place, and time. GCS score is 15.   Skin: Skin is warm and dry. No pallor.   Psychiatric: His mood appears anxious. He expresses no homicidal and no suicidal ideation. He expresses no suicidal plans and no homicidal plans.   [facility]          ED Course   Procedures  Labs Reviewed   COMPREHENSIVE METABOLIC PANEL - Abnormal       Result Value    Sodium 138      Potassium 4.2      Chloride 101      CO2 24      Glucose 97      Blood Urea Nitrogen 5.8 (*)     Creatinine 0.85      Calcium 9.3      Protein Total 7.7      Albumin 3.5      Globulin 4.2 (*)     Albumin/Globulin Ratio 0.8 (*)     Bilirubin Total 0.3      ALP 85      ALT 37      AST 52 (*)     eGFR >60      Anion Gap 13.0      BUN/Creatinine Ratio 7     TSH - Abnormal    TSH 0.273 (*)    URINALYSIS, REFLEX TO URINE CULTURE - Abnormal    Color, UA Yellow      Appearance, UA Clear      Specific Gravity, UA 1.022      pH, UA 6.0      Protein, UA 1+ (*)     Glucose, UA Normal      Ketones, UA Negative      Blood, UA Negative      Bilirubin, UA Negative      Urobilinogen, UA 2.0 (*)     Nitrites, UA Negative      Leukocyte Esterase, UA Negative      RBC, UA 0-5      WBC, UA 6-10 (*)     Bacteria, UA None Seen      Squamous Epithelial Cells, UA Trace      Mucous, UA Occasional (*)     Hyaline Casts, UA 6-10 (*)    DRUG SCREEN, URINE (BEAKER) - Abnormal    Amphetamines, Urine Negative      Barbiturates, Urine Negative      Benzodiazepine, Urine Positive (*)     Cannabinoids, Urine Negative      Cocaine, Urine Positive (*)     Fentanyl, Urine Negative      MDMA, Urine Negative      Opiates, Urine Positive  (*)     Phencyclidine, Urine Negative      pH, Urine 6.0      Specific Gravity, Urine Auto 1.022      Narrative:     Cut off concentrations:    Amphetamines - 1000 ng/ml  Barbiturates - 200 ng/ml  Benzodiazepine - 200 ng/ml  Cannabinoids (THC) - 50 ng/ml  Cocaine - 300 ng/ml  Fentanyl - 1.0 ng/ml  MDMA - 500 ng/ml  Opiates - 300 ng/ml   Phencyclidine (PCP) - 25 ng/ml    Specimen submitted for drug analysis and tested for pH and specific gravity in order to evaluate sample integrity. Suspect tampering if specific gravity is <1.003 and/or pH is not within the range of 4.5 - 8.0  False negatives may result form substances such as bleach added to urine.  False positives may result for the presence of a substance with similar chemical structure to the drug or its metabolite.    This test provides only a PRELIMINARY analytical test result. A more specific alternate chemical method must be used in order to obtain a confirmed analytical result. Gas chromatography/mass spectrometry (GC/MS) is the preferred confirmatory method. Other chemical confirmation methods are available. Clinical consideration and professional judgement should be applied to any drug of abuse test result, particularly when preliminary positive results are used.    Positive results will be confirmed only at the physicians request. Unconfirmed screening results are to be used only for medical purposes (treatment).        ACETAMINOPHEN LEVEL - Abnormal    Acetaminophen Level <3.0 (*)    SALICYLATE LEVEL - Abnormal    Salicylate Level <5.0 (*)    CBC WITH DIFFERENTIAL - Abnormal    WBC 5.69      RBC 3.55 (*)     Hgb 10.8 (*)     Hct 33.7 (*)     MCV 94.9 (*)     MCH 30.4      MCHC 32.0 (*)     RDW 14.8      Platelet 258      MPV 8.6      Neut % 55.8      Lymph % 28.8      Mono % 12.5      Eos % 1.6      Basophil % 1.1      Lymph # 1.64      Neut # 3.18      Mono # 0.71      Eos # 0.09      Baso # 0.06      IG# 0.01      IG% 0.2      NRBC% 0.0      ALCOHOL,MEDICAL (ETHANOL) - Normal    Ethanol Level <10.0     CBC W/ AUTO DIFFERENTIAL    Narrative:     The following orders were created for panel order CBC auto differential.  Procedure                               Abnormality         Status                     ---------                               -----------         ------                     CBC with Differential[[id number]]       Abnormal            Final result                 Please view results for these tests on the individual orders.   SARS-COV-2 RNA AMPLIFICATION, QUAL     EKG Readings: (Independently Interpreted)   Initial Reading: No STEMI. Rhythm: Sinus Tachycardia. Heart Rate: 100. Ectopy: Rare PVCs. T Waves Flipped: III and AVF.       Imaging Results              X-Ray Chest 1 View (In process)                   X-Rays:   Independently Interpreted Readings:   Chest X-Ray: No acute abnormalities.     Medications   LORazepam tablet 2 mg (2 mg Oral Given 8/15/24 1952)   ondansetron disintegrating tablet 4 mg (4 mg Oral Given 8/15/24 1953)     Medical Decision Making  Differential diagnosis includes but is not limited to: substance abuse, psychiatric disorder, intoxication, withdrawal      No indications for PEC at this time  [facility] here and assisting in resources  Psych labs normal  UDS positive for benzo, opioids and cocaine   Medically clear for rehab     Amount and/or Complexity of Data Reviewed  Independent Historian: caregiver     Details: Pt relative at bedside states they have a bed ready to [facility] in [place] but they require medical clearance before they are able to take the pt.  Labs: ordered. Decision-making details documented in ED Course.  Radiology: ordered.    Risk  Prescription drug management.            Scribe Attestation:   Scribe #1: I performed the above scribed service and the documentation accurately describes the services I performed. I attest to the accuracy of the note.    Attending Attestation:            Physician Attestation for Scribe:  Physician Attestation Statement for Scribe #1: I, Sandy Lal MD, reviewed documentation, as scribed by Osbaldo Inman in my presence, and it is both accurate and complete.             ED Course as of 08/15/24 2113   Thu Aug 15, 2024   2033 Benzodiazepine, Urine(!): Positive [KM]   2033 Cocaine, Urine(!): Positive [KM]   2033 Opiates, Urine(!): Positive [KM]   2033 [place] is here to assess patient and assist in resources.  [facility] [KM]      ED Course User Index  [KM] Sandy Lal MD       Medically cleared for psychiatry placement: 8/15/2024  9:13 PM                   Clinical Impression:  Final diagnoses:  [Z00.8] Medical clearance for psychiatric admission  [F19.10] Polysubstance abuse (Primary)          ED Disposition Condition    Transfer to Psych Facility Stable          ED Prescriptions    None       Follow-up Information    None          Sandy Lal MD  08/15/24 2113

## 2024-09-18 ENCOUNTER — OFFICE VISIT (OUTPATIENT)
Dept: HEMATOLOGY/ONCOLOGY | Facility: CLINIC | Age: 59
End: 2024-09-18
Payer: OTHER GOVERNMENT

## 2024-09-18 VITALS
TEMPERATURE: 98 F | RESPIRATION RATE: 14 BRPM | SYSTOLIC BLOOD PRESSURE: 104 MMHG | OXYGEN SATURATION: 96 % | HEIGHT: 66 IN | BODY MASS INDEX: 36.69 KG/M2 | WEIGHT: 228.31 LBS | DIASTOLIC BLOOD PRESSURE: 70 MMHG | HEART RATE: 125 BPM

## 2024-09-18 DIAGNOSIS — D64.9 ANEMIA: ICD-10-CM

## 2024-09-18 LAB
BASOPHILS # BLD AUTO: 0.05 X10(3)/MCL
BASOPHILS NFR BLD AUTO: 0.7 %
DIRECT COOMBS (DAT): NORMAL
EOSINOPHIL # BLD AUTO: 0.02 X10(3)/MCL (ref 0–0.9)
EOSINOPHIL NFR BLD AUTO: 0.3 %
ERYTHROCYTE [DISTWIDTH] IN BLOOD BY AUTOMATED COUNT: 14.5 % (ref 11.5–17)
FERRITIN SERPL-MCNC: 110.68 NG/ML (ref 21.81–274.66)
FOLATE SERPL-MCNC: 15 NG/ML (ref 7–31.4)
HCT VFR BLD AUTO: 34.9 % (ref 42–52)
HGB BLD-MCNC: 11.6 G/DL (ref 14–18)
IGA SERPL-MCNC: 326 MG/DL (ref 63–484)
IGG SERPL-MCNC: 2048 MG/DL (ref 540–1822)
IGM SERPL-MCNC: 50 MG/DL (ref 22–240)
IMM GRANULOCYTES # BLD AUTO: 0.01 X10(3)/MCL (ref 0–0.04)
IMM GRANULOCYTES NFR BLD AUTO: 0.1 %
IRON SATN MFR SERPL: 18 % (ref 20–50)
IRON SERPL-MCNC: 46 UG/DL (ref 65–175)
LDH SERPL-CCNC: 166 U/L (ref 125–220)
LYMPHOCYTES # BLD AUTO: 1.6 X10(3)/MCL (ref 0.6–4.6)
LYMPHOCYTES NFR BLD AUTO: 21.7 %
MCH RBC QN AUTO: 31.2 PG (ref 27–31)
MCHC RBC AUTO-ENTMCNC: 33.2 G/DL (ref 33–36)
MCV RBC AUTO: 93.8 FL (ref 80–94)
MONOCYTES # BLD AUTO: 0.6 X10(3)/MCL (ref 0.1–1.3)
MONOCYTES NFR BLD AUTO: 8.1 %
NEUTROPHILS # BLD AUTO: 5.09 X10(3)/MCL (ref 2.1–9.2)
NEUTROPHILS NFR BLD AUTO: 69.1 %
PLATELET # BLD AUTO: 177 X10(3)/MCL (ref 130–400)
PMV BLD AUTO: 8.4 FL (ref 7.4–10.4)
RBC # BLD AUTO: 3.72 X10(6)/MCL (ref 4.7–6.1)
RET# (OHS): 0.08 X10E6/UL (ref 0.03–0.1)
RETICULOCYTE COUNT AUTOMATED (OLG): 2.06 % (ref 1.1–2.1)
RHEUMATOID FACT SERPL-ACNC: <13 IU/ML
TIBC SERPL-MCNC: 205 UG/DL (ref 69–240)
TIBC SERPL-MCNC: 251 UG/DL (ref 250–450)
TRANSFERRIN SERPL-MCNC: 228 MG/DL (ref 174–364)
VIT B12 SERPL-MCNC: 683 PG/ML (ref 213–816)
WBC # BLD AUTO: 7.37 X10(3)/MCL (ref 4.5–11.5)

## 2024-09-18 PROCEDURE — 86431 RHEUMATOID FACTOR QUANT: CPT | Mod: 59 | Performed by: INTERNAL MEDICINE

## 2024-09-18 PROCEDURE — 83540 ASSAY OF IRON: CPT | Performed by: INTERNAL MEDICINE

## 2024-09-18 PROCEDURE — 36415 COLL VENOUS BLD VENIPUNCTURE: CPT | Performed by: INTERNAL MEDICINE

## 2024-09-18 PROCEDURE — 99214 OFFICE O/P EST MOD 30 MIN: CPT | Mod: PBBFAC | Performed by: INTERNAL MEDICINE

## 2024-09-18 PROCEDURE — 86880 COOMBS TEST DIRECT: CPT | Performed by: INTERNAL MEDICINE

## 2024-09-18 PROCEDURE — 82784 ASSAY IGA/IGD/IGG/IGM EACH: CPT | Performed by: INTERNAL MEDICINE

## 2024-09-18 PROCEDURE — 83550 IRON BINDING TEST: CPT | Performed by: INTERNAL MEDICINE

## 2024-09-18 PROCEDURE — 86225 DNA ANTIBODY NATIVE: CPT | Performed by: INTERNAL MEDICINE

## 2024-09-18 PROCEDURE — 83615 LACTATE (LD) (LDH) ENZYME: CPT | Performed by: INTERNAL MEDICINE

## 2024-09-18 PROCEDURE — 99204 OFFICE O/P NEW MOD 45 MIN: CPT | Mod: S$PBB,,, | Performed by: INTERNAL MEDICINE

## 2024-09-18 PROCEDURE — 86431 RHEUMATOID FACTOR QUANT: CPT | Performed by: INTERNAL MEDICINE

## 2024-09-18 PROCEDURE — 86235 NUCLEAR ANTIGEN ANTIBODY: CPT | Performed by: INTERNAL MEDICINE

## 2024-09-18 PROCEDURE — 86334 IMMUNOFIX E-PHORESIS SERUM: CPT | Performed by: INTERNAL MEDICINE

## 2024-09-18 PROCEDURE — 85025 COMPLETE CBC W/AUTO DIFF WBC: CPT | Performed by: INTERNAL MEDICINE

## 2024-09-18 PROCEDURE — 99999 PR PBB SHADOW E&M-EST. PATIENT-LVL IV: CPT | Mod: PBBFAC,,, | Performed by: INTERNAL MEDICINE

## 2024-09-18 PROCEDURE — 83521 IG LIGHT CHAINS FREE EACH: CPT | Performed by: INTERNAL MEDICINE

## 2024-09-18 PROCEDURE — 85045 AUTOMATED RETICULOCYTE COUNT: CPT | Performed by: INTERNAL MEDICINE

## 2024-09-18 PROCEDURE — 82746 ASSAY OF FOLIC ACID SERUM: CPT | Performed by: INTERNAL MEDICINE

## 2024-09-18 PROCEDURE — 82728 ASSAY OF FERRITIN: CPT | Performed by: INTERNAL MEDICINE

## 2024-09-18 PROCEDURE — 82607 VITAMIN B-12: CPT | Performed by: INTERNAL MEDICINE

## 2024-09-18 NOTE — PROGRESS NOTES
[facility] physician[facility]  Reason for referral: Anemia      Subjective:       Patient ID: Trey Hancock is a 59 y.o. male.    Anemia--Since 7/2023      Chief Complaint: Other Misc (NPH)    HPI  60 yo male with PMH vit D deficiency, JANETH, schizoaffective disorder, PTSD found on labs to have mild anemia since 2023 and has been referred for work-up. Work-up in 12/2023 showed normal B12, ferritin and SPEP. Patient has chronic knee pain/arthritis. He has some fatigue. But otherwise no other problems. He denies any bleeding. He is up to date on his colonoscopy, had one in 2017 and no polyps with repeat recommended in 10 years. Patient does report that he has been having elevated heart rate and will be going to see his PCP soon.     Past Medical History:   Diagnosis Date    Depression     Hypertension     PTSD (post-traumatic stress disorder)       No past surgical history on file.  No family history on file.  Social History     Socioeconomic History    Marital status:    Tobacco Use    Smoking status: Never    Smokeless tobacco: Never       Review of patient's allergies indicates:  No Known Allergies   Current Outpatient Medications on File Prior to Visit   Medication Sig Dispense Refill    amitriptyline (ELAVIL) 100 MG tablet Take 100 mg by mouth.      busPIRone (BUSPAR) 10 MG tablet buspirone Take No date recorded No form recorded No frequency recorded No route recorded No set duration recorded No set duration amount recorded active No dosage strength recorded No dosage strength units of measure recorded      citalopram (CELEXA) 40 MG tablet Take 40 mg by mouth once daily.      CLONIDINE HCL ORAL clonidine Take No date recorded No form recorded No frequency recorded No route recorded No set duration recorded No set duration amount recorded active No dosage strength recorded No dosage strength units of measure recorded      etodolac (LODINE) 200 MG Cap Take 200 mg by mouth 2 (two) times daily.       fluticasone propionate (FLONASE) 50 mcg/actuation nasal spray Flonase Allergy Relief Take 1 spray(s) (nasal) 2 times per day for 7 days 20221027 spray,suspension 2 times per day nasal 7 days active 50 mcg/actuation      fluvoxamine maleate (FLUVOXAMINE ORAL) fluvoxamine Take No date recorded No form recorded No frequency recorded No route recorded No set duration recorded No set duration amount recorded active No dosage strength recorded No dosage strength units of measure recorded      GABAPENTIN ORAL Take 900 mg by mouth.      METOPROLOL SUCCINATE ORAL metoprolol succinate Take No date recorded No form recorded No frequency recorded No route recorded No set duration recorded No set duration amount recorded active No dosage strength recorded No dosage strength units of measure recorded      simvastatin (ZOCOR) 10 MG tablet simvastatin Take No date recorded No form recorded No frequency recorded No route recorded No set duration recorded No set duration amount recorded active No dosage strength recorded No dosage strength units of measure recorded      clonazePAM (KLONOPIN) 2 MG Tab Take 2 mg by mouth 2 (two) times daily as needed. (Patient not taking: Reported on 9/18/2024)      guaiFENesin (MUCINEX) 600 mg 12 hr tablet guaifenesin Take 1 tablet (oral) 2 times per day for 5 days 20221027 tablet extended release 12hr 2 times per day oral 5 days active 600 mg (Patient not taking: Reported on 9/18/2024)      promethazine-dextromethorphan (PROMETHAZINE-DM) 6.25-15 mg/5 mL Syrp Take 5 mLs by mouth every 4 (four) hours as needed. (Patient not taking: Reported on 9/18/2024)      traMADoL (ULTRAM) 50 mg tablet tramadol Take 1 tablet (oral) every 6 hours PRN - Pain for 5 days 20220530 tablet every 6 hours oral 5 days suspended 50 mg (Patient not taking: Reported on 9/18/2024)       No current facility-administered medications on file prior to visit.      Review of Systems   Constitutional:  Negative for appetite change,  "fatigue, fever and unexpected weight change.   HENT:  Negative for mouth sores.    Eyes: Negative.    Respiratory:  Negative for cough and shortness of breath.    Cardiovascular:  Negative for chest pain and leg swelling.   Gastrointestinal:  Negative for abdominal distention, abdominal pain, constipation, diarrhea, nausea, vomiting and reflux.   Genitourinary:  Negative for difficulty urinating, dysuria and hematuria.   Musculoskeletal:  Positive for arthralgias. Negative for back pain.   Integumentary:  Negative for rash.   Neurological:  Negative for weakness and headaches.   Hematological:  Negative for adenopathy.   Psychiatric/Behavioral:  Positive for depressed mood. Negative for sleep disturbance. The patient is not nervous/anxious.         Vitals:    09/18/24 1333   BP: 104/70   Pulse: (!) 125   Resp: 14   Temp: 98.3 °F (36.8 °C)   TempSrc: Oral   SpO2: 96%   Weight: 103.6 kg (228 lb 4.8 oz)   Height: 5' 6" (1.676 m)      Physical Exam  Constitutional:       General: He is awake.      Appearance: Normal appearance.   HENT:      Head: Normocephalic and atraumatic.      Nose: Nose normal.      Mouth/Throat:      Mouth: Mucous membranes are moist.   Eyes:      General: Vision grossly intact.      Extraocular Movements: Extraocular movements intact.      Conjunctiva/sclera: Conjunctivae normal.   Cardiovascular:      Rate and Rhythm: Normal rate and regular rhythm.      Heart sounds: Normal heart sounds.   Pulmonary:      Effort: Pulmonary effort is normal.      Breath sounds: Normal breath sounds.   Abdominal:      General: Bowel sounds are normal. There is no distension.      Palpations: Abdomen is soft.      Tenderness: There is no abdominal tenderness.      Comments: +abdominal hernia noted, abdomen obese   Musculoskeletal:      Cervical back: Normal range of motion and neck supple.      Right lower leg: No edema.      Left lower leg: No edema.      Comments: +joint swelling noted to his MCPs in his hands. " OA noted in his knees   Lymphadenopathy:      Cervical: No cervical adenopathy.      Upper Body:      Right upper body: No supraclavicular adenopathy.      Left upper body: No supraclavicular adenopathy.   Skin:     General: Skin is warm.   Neurological:      Mental Status: He is alert and oriented to person, place, and time.      Motor: Motor function is intact.   Psychiatric:         Mood and Affect: Mood normal.         Speech: Speech normal.         Behavior: Behavior is cooperative.         Judgment: Judgment normal.         Office Visit on 09/18/2024   Component Date Value Ref Range Status    Iron Binding Capacity Unsaturated 09/18/2024 205  69 - 240 ug/dL Final    Iron Level 09/18/2024 46 (L)  65 - 175 ug/dL Final    Transferrin 09/18/2024 228  174 - 364 mg/dL Final    Iron Binding Capacity Total 09/18/2024 251  250 - 450 ug/dL Final    Iron Saturation 09/18/2024 18 (L)  20 - 50 % Final    Ferritin Level 09/18/2024 110.68  21.81 - 274.66 ng/mL Final    Vitamin B12 09/18/2024 683  213 - 816 pg/mL Final    Retic Cnt Auto 09/18/2024 2.06  1.1 - 2.1 % Final    RET# 09/18/2024 0.0754  0.026 - 0.095 x10e6/uL Final    Folate Level 09/18/2024 15.0  7.0 - 31.4 ng/mL Final    Lactate Dehydrogenase 09/18/2024 166  125 - 220 U/L Final    Rheumatoid Factor Quantitative 09/18/2024 <13  <=30 IU/mL Final    WBC 09/18/2024 7.37  4.50 - 11.50 x10(3)/mcL Final    RBC 09/18/2024 3.72 (L)  4.70 - 6.10 x10(6)/mcL Final    Hgb 09/18/2024 11.6 (L)  14.0 - 18.0 g/dL Final    Hct 09/18/2024 34.9 (L)  42.0 - 52.0 % Final    MCV 09/18/2024 93.8  80.0 - 94.0 fL Final    MCH 09/18/2024 31.2 (H)  27.0 - 31.0 pg Final    MCHC 09/18/2024 33.2  33.0 - 36.0 g/dL Final    RDW 09/18/2024 14.5  11.5 - 17.0 % Final    Platelet 09/18/2024 177  130 - 400 x10(3)/mcL Final    MPV 09/18/2024 8.4  7.4 - 10.4 fL Final    Neut % 09/18/2024 69.1  % Final    Lymph % 09/18/2024 21.7  % Final    Mono % 09/18/2024 8.1  % Final    Eos % 09/18/2024 0.3  % Final     Basophil % 09/18/2024 0.7  % Final    Lymph # 09/18/2024 1.60  0.6 - 4.6 x10(3)/mcL Final    Neut # 09/18/2024 5.09  2.1 - 9.2 x10(3)/mcL Final    Mono # 09/18/2024 0.60  0.1 - 1.3 x10(3)/mcL Final    Eos # 09/18/2024 0.02  0 - 0.9 x10(3)/mcL Final    Baso # 09/18/2024 0.05  <=0.2 x10(3)/mcL Final    IG# 09/18/2024 0.01  0 - 0.04 x10(3)/mcL Final    IG% 09/18/2024 0.1  % Final    IgG Level 09/18/2024 2,048.00 (H)  540.00 - 1,822.00 mg/dL Final    IgA Level 09/18/2024 326.0  63.0 - 484.0 mg/dL Final    IgM Level 09/18/2024 50.0  22.0 - 240.0 mg/dL Final         Assessment:       1. Anemia         Plan:       Patient with mild anemia, unremarkable previous work-up.  Will send full work-up today.   F/u in 2 weeks for results and further recommendations.     All questions answered at this time.      Reina Gilbert MD

## 2024-09-19 LAB
HEMATOLOGIST REVIEW: NORMAL
KAPPA LC FREE SER NEPH-MCNC: 3.15 MG/DL (ref 0.33–1.94)
KAPPA LC FREE/LAMBDA FREE SER NEPH: 1.84 {RATIO} (ref 0.26–1.65)
LAMBDA LC FREE SERPL-MCNC: 1.71 MG/DL (ref 0.57–2.63)
RHEUMATOID FACTOR IGA QUANTITATIVE (OLG): 4.1 IU/ML
RHEUMATOID FACTOR IGM QUANTITATIVE (OLG): 0.7 IU/ML

## 2024-09-20 LAB
ALBUMIN % SPEP (OHS): 43.6 (ref 48.1–59.5)
ALBUMIN SERPL-MCNC: 3.3 G/DL (ref 3.5–5)
ALBUMIN/GLOB SERPL: 0.8 RATIO (ref 1.1–2)
ALPHA 1 GLOB (OHS): 0.3 GM/DL (ref 0–0.4)
ALPHA 1 GLOB% (OHS): 3.95 (ref 2.3–4.9)
ALPHA 2 GLOB % (OHS): 10.9 (ref 6.9–13)
ALPHA 2 GLOB (OHS): 0.82 GM/DL (ref 0.4–1)
ANTINUCLEAR ANTIBODY SCREEN (OHS): NEGATIVE
BETA GLOB (OHS): 1.18 GM/DL (ref 0.7–1.3)
BETA GLOB% (OHS): 15.74 (ref 13.8–19.7)
CENTROMERE QUANT (OHS): 0.5 U/ML
DSDNA AB QUANT (OHS): 1.3 IU/ML
GAMMA GLOBULIN % (OHS): 25.81 (ref 10.1–21.9)
GAMMA GLOBULIN (OHS): 1.94 GM/DL (ref 0.4–1.8)
GLOBULIN SER-MCNC: 4.2 GM/DL (ref 2.4–3.5)
JO-1 AB QUANT (OHS): 0.4 U/ML
M SPIKE % (OHS): ABNORMAL
M SPIKE (OHS): ABNORMAL
PATH REV: NORMAL
PROT SERPL-MCNC: 7.5 GM/DL (ref 6.4–8.3)
RNP70 AB QUANT (OHS): 0.4 U/ML
SCL-70S AB QUANT (OHS): 0.8 U/ML
SMITH AB QUANT (OHS): <0.7 U/ML
SSA(RO) AB QUANT (OHS): 0.4 U/ML
SSB(LA) AB QUANT (OHS): 0.5 U/ML
U1RNP AB QUANT (OHS): 1.3 U/ML

## 2024-09-30 PROBLEM — D64.89 OTHER SPECIFIED ANEMIAS: Status: ACTIVE | Noted: 2024-09-30

## 2024-09-30 NOTE — PROGRESS NOTES
Subjective:       Patient ID: Trey Hancock is a 59 y.o. male.    [facility]     Anemia--Since 7/2023    Work-up:  9/18/24--peripheral smear unremarkable, iron 46, TIBC 251, iron saturation 18%, ferritin 110, folate 15, B12 683, retic 2.06, , ANIRUDH negative, RA IgA and IgM negative, IgG 2,048 (high), IgM 50 (normal) and IgA 326 (normal), SPEP with polyclonal increase in immunoglobulins, no M-spike, K/L ratio 1.84 (slightly high), RF <13, kraig negative.     Current treatment plan: Observation    Chief Complaint: Other Misc (Pt reports no concerns today.)    HPI  58 yo male  with PMH vit D deficiency, JANETH, schizoaffective disorder, PTSD found on labs to have mild anemia since 2023 and was referred to me for work-up. Work-up was unremarkable and anemia mild. He does have a lot of issues with arthritis. On PE, he has joint swelling in his hands concerning for RA although his markers are negative. I discussed referred to Rheumatology for evaluation and monitoring of his anemia that is mild.     Past Medical History:   Diagnosis Date    Depression     Hypertension     PTSD (post-traumatic stress disorder)       History reviewed. No pertinent surgical history.  No family history on file.  Social History     Socioeconomic History    Marital status:    Tobacco Use    Smoking status: Never    Smokeless tobacco: Never       Review of patient's allergies indicates:  No Known Allergies   Current Outpatient Medications on File Prior to Visit   Medication Sig Dispense Refill    amitriptyline (ELAVIL) 100 MG tablet Take 100 mg by mouth.      ASCORBIC ACID, BULK, MISC Take 500 mg by mouth Daily.      busPIRone (BUSPAR) 10 MG tablet buspirone Take No date recorded No form recorded No frequency recorded No route recorded No set duration recorded No set duration amount recorded active No dosage strength recorded No dosage strength units of measure recorded      calcium carbonate 260 mg calcium (650 mg)  Chew Take 650 mg by mouth Daily.      citalopram (CELEXA) 40 MG tablet Take 40 mg by mouth once daily.      clonazePAM (KLONOPIN) 2 MG Tab Take 2 mg by mouth 2 (two) times daily as needed.      CLONIDINE HCL ORAL clonidine Take No date recorded No form recorded No frequency recorded No route recorded No set duration recorded No set duration amount recorded active No dosage strength recorded No dosage strength units of measure recorded      divalproex ER (DEPAKOTE ER) 500 MG Tb24 24 hr tablet Take 500 mg by mouth once daily.      fluticasone propionate (FLONASE) 50 mcg/actuation nasal spray Flonase Allergy Relief Take 1 spray(s) (nasal) 2 times per day for 7 days 20221027 spray,suspension 2 times per day nasal 7 days active 50 mcg/actuation      fluvoxamine maleate (FLUVOXAMINE ORAL) fluvoxamine Take No date recorded No form recorded No frequency recorded No route recorded No set duration recorded No set duration amount recorded active No dosage strength recorded No dosage strength units of measure recorded      furosemide (LASIX) 40 MG tablet Take 40 mg by mouth once daily.      GABAPENTIN ORAL Take 900 mg by mouth.      hydroCHLOROthiazide (MICROZIDE) 12.5 mg capsule Take 12.5 mg by mouth once daily.      meclizine (ANTIVERT) 12.5 mg tablet Take 12.5 mg by mouth 2 (two) times daily as needed.      METOPROLOL SUCCINATE ORAL metoprolol succinate Take No date recorded No form recorded No frequency recorded No route recorded No set duration recorded No set duration amount recorded active No dosage strength recorded No dosage strength units of measure recorded      OLANZapine (ZYPREXA) 20 MG tablet Take 20 mg by mouth nightly.      simvastatin (ZOCOR) 10 MG tablet simvastatin Take No date recorded No form recorded No frequency recorded No route recorded No set duration recorded No set duration amount recorded active No dosage strength recorded No dosage strength units of measure recorded      etodolac (LODINE) 200 MG  "Cap Take 200 mg by mouth 2 (two) times daily. (Patient not taking: Reported on 10/2/2024)      guaiFENesin (MUCINEX) 600 mg 12 hr tablet guaifenesin Take 1 tablet (oral) 2 times per day for 5 days 20221027 tablet extended release 12hr 2 times per day oral 5 days active 600 mg (Patient not taking: Reported on 10/2/2024)      traMADoL (ULTRAM) 50 mg tablet tramadol Take 1 tablet (oral) every 6 hours PRN - Pain for 5 days 20220530 tablet every 6 hours oral 5 days suspended 50 mg (Patient not taking: Reported on 10/2/2024)      [DISCONTINUED] promethazine-dextromethorphan (PROMETHAZINE-DM) 6.25-15 mg/5 mL Syrp Take 5 mLs by mouth every 4 (four) hours as needed. (Patient not taking: Reported on 10/2/2024)       No current facility-administered medications on file prior to visit.      Review of Systems   Constitutional:  Negative for appetite change, fatigue, fever and unexpected weight change.   HENT:  Negative for mouth sores.    Eyes: Negative.    Respiratory:  Negative for cough and shortness of breath.    Cardiovascular:  Negative for chest pain and leg swelling.   Gastrointestinal:  Negative for abdominal distention, abdominal pain, constipation, diarrhea, nausea, vomiting and reflux.   Genitourinary:  Negative for difficulty urinating, dysuria and hematuria.   Musculoskeletal:  Positive for arthralgias. Negative for back pain.   Integumentary:  Negative for rash.   Neurological:  Negative for weakness and headaches.   Hematological:  Negative for adenopathy.   Psychiatric/Behavioral:  Positive for depressed mood. Negative for sleep disturbance. The patient is not nervous/anxious.         Vitals:    10/02/24 0927   BP: (!) 84/55   Pulse: 84   Resp: 14   Temp: 98.3 °F (36.8 °C)   TempSrc: Oral   SpO2: 99%   Weight: 104.8 kg (231 lb)   Height: 5' 6" (1.676 m)        Physical Exam  Constitutional:       General: He is awake.      Appearance: Normal appearance.   HENT:      Head: Normocephalic and atraumatic.      Nose: " Nose normal.      Mouth/Throat:      Mouth: Mucous membranes are moist.   Eyes:      General: Vision grossly intact.      Extraocular Movements: Extraocular movements intact.      Conjunctiva/sclera: Conjunctivae normal.   Cardiovascular:      Rate and Rhythm: Normal rate and regular rhythm.      Heart sounds: Normal heart sounds.   Pulmonary:      Effort: Pulmonary effort is normal.      Breath sounds: Normal breath sounds.   Abdominal:      General: Bowel sounds are normal. There is no distension.      Palpations: Abdomen is soft.      Tenderness: There is no abdominal tenderness.      Comments: +abdominal hernia noted, abdomen obese   Musculoskeletal:      Cervical back: Normal range of motion and neck supple.      Right lower leg: No edema.      Left lower leg: No edema.      Comments: +joint swelling noted to his MCPs in his hands. OA noted in his knees   Lymphadenopathy:      Cervical: No cervical adenopathy.      Upper Body:      Right upper body: No supraclavicular adenopathy.      Left upper body: No supraclavicular adenopathy.   Skin:     General: Skin is warm.   Neurological:      Mental Status: He is alert and oriented to person, place, and time.      Motor: Motor function is intact.   Psychiatric:         Mood and Affect: Mood normal.         Speech: Speech normal.         Behavior: Behavior is cooperative.         Judgment: Judgment normal.         Office Visit on 09/18/2024   Component Date Value Ref Range Status    Iron Binding Capacity Unsaturated 09/18/2024 205  69 - 240 ug/dL Final    Iron Level 09/18/2024 46 (L)  65 - 175 ug/dL Final    Transferrin 09/18/2024 228  174 - 364 mg/dL Final    Iron Binding Capacity Total 09/18/2024 251  250 - 450 ug/dL Final    Iron Saturation 09/18/2024 18 (L)  20 - 50 % Final    Ferritin Level 09/18/2024 110.68  21.81 - 274.66 ng/mL Final    Vitamin B12 09/18/2024 683  213 - 816 pg/mL Final    Retic Cnt Auto 09/18/2024 2.06  1.1 - 2.1 % Final    RET# 09/18/2024  0.0754  0.026 - 0.095 x10e6/uL Final    Folate Level 09/18/2024 15.0  7.0 - 31.4 ng/mL Final    Lactate Dehydrogenase 09/18/2024 166  125 - 220 U/L Final    Direct Adrián (CHLOE) 09/18/2024 NEG   Final    RA IgA Quant 09/18/2024 4.1  <14 IU/mL Final      <14:   Negative   14-20: Equivocal   >20:   Positive    RA IgM Quant 09/18/2024 0.7  <3.5 IU/mL Final      <3.5:    Negative   3.5-5.0: Equivocal   >5.0:    Positive    Rheumatoid Factor Quantitative 09/18/2024 <13  <=30 IU/mL Final    ANIRUDH Screen 09/18/2024 Negative  Negative Final    In the case of equivocal results, it is recommended to retest the patient after 8-12 weeks.    ANIRUDH Screen is performed using WILLI which utilizes the Florescent Enzyme Immunoassay (FEIA) method.    DSDNA Ab Quant 09/18/2024 1.3  <10.0 IU/mL Final      <10:   Negative  10-15: Equivocal  >15:   Positive    In the case of equivocal results, it is recommended to retest the patient after 8-12 weeks    U1RNP Ab Quant 09/18/2024 1.3  <5 U/mL Final      <5:   Negative  5-10: Equivocal  >10:  Positive    In the case of equivocal results, it is recommended to retest the patient after 8-12 weeks    RNP70 Ab Quant 09/18/2024 0.4  <7 U/mL Final      <7:   Negative  7-10: Equivocal  >10:  Positive    In the case of equivocal results, it is recommended to retest the patient after 8-12 weeks    SSA(Ro) Ab Quant 09/18/2024 0.4  <7 U/mL Final      <7:   Negative  7-10: Equivocal  >10:  Positive    In the case of equivocal results, it is recommended to retest the patient after 8-12 weeks    SSB(La) Ab Quant 09/18/2024 0.5  <7 U/mL Final      <7:   Negative  7-10: Equivocal  >10:  Positive    In the case of equivocal results, it is recommended to retest the patient after 8-12 weeks    Centromere Ab Quant 09/18/2024 0.5  <7 U/mL Final      <7:   Negative  7-10: Equivocal  >10:  Positive    In the case of equivocal results, it is recommended to retest the patient after 8-12 weeks    SCL-70S Ab Quant 09/18/2024  0.8  <7 U/mL Final      <7:   Negative  7-10: Equivocal  >10:  Positive    In the case of equivocal results, it is recommended to retest the patient after 8-12 weeks    LEVI-1 Ab Quant 09/18/2024 0.4  <7 U/mL Final      <7:   Negative  7-10: Equivocal  >10:  Positive    In the case of equivocal results, it is recommended to retest the patient after 8-12 weeks    Cutler DP IgG Quant 09/18/2024 <0.7  <7 U/mL Final      <7:   Negative  7-10: Equivocal  >10:  Positive    In the case of equivocal results, it is recommended to retest the patient after 8-12 weeks    Peripheral Smear Evaluation 09/18/2024 The erythrocytes, granulocytes, monocytes, lymphocytes and platelets are unremarkable.    Luis F Arriaga M.D., Ph.D.    Final    WBC 09/18/2024 7.37  4.50 - 11.50 x10(3)/mcL Final    RBC 09/18/2024 3.72 (L)  4.70 - 6.10 x10(6)/mcL Final    Hgb 09/18/2024 11.6 (L)  14.0 - 18.0 g/dL Final    Hct 09/18/2024 34.9 (L)  42.0 - 52.0 % Final    MCV 09/18/2024 93.8  80.0 - 94.0 fL Final    MCH 09/18/2024 31.2 (H)  27.0 - 31.0 pg Final    MCHC 09/18/2024 33.2  33.0 - 36.0 g/dL Final    RDW 09/18/2024 14.5  11.5 - 17.0 % Final    Platelet 09/18/2024 177  130 - 400 x10(3)/mcL Final    MPV 09/18/2024 8.4  7.4 - 10.4 fL Final    Neut % 09/18/2024 69.1  % Final    Lymph % 09/18/2024 21.7  % Final    Mono % 09/18/2024 8.1  % Final    Eos % 09/18/2024 0.3  % Final    Basophil % 09/18/2024 0.7  % Final    Lymph # 09/18/2024 1.60  0.6 - 4.6 x10(3)/mcL Final    Neut # 09/18/2024 5.09  2.1 - 9.2 x10(3)/mcL Final    Mono # 09/18/2024 0.60  0.1 - 1.3 x10(3)/mcL Final    Eos # 09/18/2024 0.02  0 - 0.9 x10(3)/mcL Final    Baso # 09/18/2024 0.05  <=0.2 x10(3)/mcL Final    IG# 09/18/2024 0.01  0 - 0.04 x10(3)/mcL Final    IG% 09/18/2024 0.1  % Final    IgG Level 09/18/2024 2,048.00 (H)  540.00 - 1,822.00 mg/dL Final    IgA Level 09/18/2024 326.0  63.0 - 484.0 mg/dL Final    IgM Level 09/18/2024 50.0  22.0 - 240.0 mg/dL Final    Kappa Free Light Chain  09/18/2024 3.15 (H)  0.3300 - 1.94 mg/dL Final    Lambda Free Light Chain 09/18/2024 1.71  0.5700 - 2.63 mg/dL Final    Kappa/Lambda FLC Ratio 09/18/2024 1.84 (H)  0.2600 - 1.65 Final    Elevated free light chain ratios between 1.66 and 3.00 may   occur due to polyclonal hypergammaglobulinemia or impaired   renal clearance. An isolated increased free light chain   ratio in this range should be interpreted with caution,   and clinical correlation is recommended.     Test Performed by:  [facility]  [address] [place] [address]  : Lex Kowalski Ph.D.; CLIA# [id number]    Protein Total 09/18/2024 7.5  6.4 - 8.3 gm/dL Final    Albumin 09/18/2024 3.3 (L)  3.5 - 5.0 g/dL Final    Globulin 09/18/2024 4.2 (H)  2.4 - 3.5 gm/dL Final    Albumin/Globulin Ratio 09/18/2024 0.8 (L)  1.1 - 2.0 ratio Final    Albumin, SPEP 09/18/2024 43.60 (L)  48.1 - 59.5 Final    Alpha 1 Glob % 09/18/2024 3.95  2.3 - 4.9 Final    Alpha 1 Glob 09/18/2024 0.30  0.00 - 0.40 gm/dl Final    Alpha 2 Glob% 09/18/2024 10.90  6.9 - 13 Final    Alpha 2 Glob 09/18/2024 0.82  0.40 - 1.00 gm/dl Final    Beta Glob % 09/18/2024 15.74  13.8 - 19.7 Final    Beta Glob 09/18/2024 1.18  0.70 - 1.30 gm/dl Final    Gamma Globulin % 09/18/2024 25.81 (H)  10.1 - 21.9 Final    Gamma Globulin 09/18/2024 1.94 (H)  0.40 - 1.80 gm/dl Final    M James % 09/18/2024    Final    None observed        M James 09/18/2024    Final    None observed        Pathology Review 09/18/2024 The SPE shows a normal distribution of serum protein fractions.    The SHELBY shows a polyclonal pattern in IgG, IgA and IgM immunoglobulin classes.    Luis F Arriaga M.D., Ph.D.    Final         Assessment:       1. Anemia due to other cause, not classified         Plan:       Patient with mild anemia, unremarkable work-up.  I am still concerned about RA given findings on PE in his hand joints.     Plan for observation only for anemia at  this time.  F/u in 6 months with repeat labs. Will also check inflammatory markers and anti-CCP antibodies.    Refer to Rheumatology for evaluation.     His BP is low today, but I suspect he is on too much BP medication, taking clonidine. He will get back with his PCP.     Of note, he is up to date on his colonoscopy.     All questions answered at this time.      Reina Gilbert MD

## 2024-10-02 ENCOUNTER — OFFICE VISIT (OUTPATIENT)
Dept: HEMATOLOGY/ONCOLOGY | Facility: CLINIC | Age: 59
End: 2024-10-02
Payer: OTHER GOVERNMENT

## 2024-10-02 VITALS
RESPIRATION RATE: 14 BRPM | HEIGHT: 66 IN | SYSTOLIC BLOOD PRESSURE: 84 MMHG | HEART RATE: 84 BPM | WEIGHT: 231 LBS | TEMPERATURE: 98 F | BODY MASS INDEX: 37.12 KG/M2 | OXYGEN SATURATION: 99 % | DIASTOLIC BLOOD PRESSURE: 55 MMHG

## 2024-10-02 DIAGNOSIS — D64.89 ANEMIA DUE TO OTHER CAUSE, NOT CLASSIFIED: Primary | ICD-10-CM

## 2024-10-02 PROCEDURE — 99214 OFFICE O/P EST MOD 30 MIN: CPT | Mod: S$PBB,,, | Performed by: INTERNAL MEDICINE

## 2024-10-02 PROCEDURE — 99215 OFFICE O/P EST HI 40 MIN: CPT | Mod: PBBFAC | Performed by: INTERNAL MEDICINE

## 2024-10-02 PROCEDURE — 99999 PR PBB SHADOW E&M-EST. PATIENT-LVL V: CPT | Mod: PBBFAC,,, | Performed by: INTERNAL MEDICINE

## 2024-10-02 RX ORDER — CALCIUM CARBONATE 260MG(650)
650 TABLET,CHEWABLE ORAL DAILY
COMMUNITY
Start: 2023-11-03

## 2024-10-02 RX ORDER — HYDROCHLOROTHIAZIDE 12.5 MG/1
12.5 CAPSULE ORAL DAILY
COMMUNITY

## 2024-10-02 RX ORDER — DIVALPROEX SODIUM 500 MG/1
500 TABLET, FILM COATED, EXTENDED RELEASE ORAL DAILY
COMMUNITY
Start: 2024-09-04

## 2024-10-02 RX ORDER — FUROSEMIDE 40 MG/1
40 TABLET ORAL DAILY
COMMUNITY
Start: 2024-07-01

## 2024-10-02 RX ORDER — OLANZAPINE 20 MG/1
20 TABLET ORAL NIGHTLY
COMMUNITY

## 2024-10-02 RX ORDER — MECLIZINE HCL 12.5 MG 12.5 MG/1
12.5 TABLET ORAL 2 TIMES DAILY PRN
COMMUNITY
Start: 2024-07-01

## 2024-10-16 NOTE — H&P (VIEW-ONLY)
Surgical Oncology Clinic    Patient ID: [id number]     Chief Complaint: Umbilical hernia       HPI:     Trey Hancock is a 59 y.o. male here today for evaluation [facility]. He is status post lap Nissen (2000) and ex lap for bowel obstruction (2010) with subsequent post op hematoma requiring intervention. Patient later developed incisional hernia.He was evaluated in May 2021 by Dr Luke and weight loss was recommended. CT abdomen and pelvis with contrast 7/10/24 revealed fat-containing supraumbilical hernia and fat-containing paraumbilical hernia. No bowel containing hernia.     Past Medical History:   Diagnosis Date    Anemia     Anxiety     Arthritis of knee, right     Bowel obstruction     Chronic pancreatitis     Depression     HLD (hyperlipidemia)     Hypertension     Lumbar degenerative disc disease     PTSD (post-traumatic stress disorder)     Schizophrenia     Sleep apnea         Past Surgical History:   Procedure Laterality Date    abdominal wall hematoma drainage      EXPLORATORY LAPAROTOMY      LAPAROSCOPIC NISSEN FUNDOPLICATION      ROTATOR CUFF REPAIR Left     VARICOSE VEIN SURGERY Left     leg        Social History     Tobacco Use    Smoking status: Never    Smokeless tobacco: Never   Substance and Sexual Activity    Alcohol use: Not on file    Drug use: Not on file    Sexual activity: Not on file        Current Outpatient Medications   Medication Instructions    amitriptyline (ELAVIL) 100 mg    ASCORBIC ACID, BULK, MISC 500 mg, Daily    busPIRone (BUSPAR) 10 MG tablet buspirone Take No date recorded No form recorded No frequency recorded No route recorded No set duration recorded No set duration amount recorded active No dosage strength recorded No dosage strength units of measure recorded    calcium carbonate 650 mg, Daily    citalopram (CELEXA) 40 mg, Every morning    clonazePAM (KLONOPIN) 2 mg, 2 times daily PRN    CLONIDINE HCL ORAL clonidine Take No date recorded  No form recorded No frequency recorded No route recorded No set duration recorded No set duration amount recorded active No dosage strength recorded No dosage strength units of measure recorded    divalproex ER (DEPAKOTE ER) 500 mg, Daily    fluticasone propionate (FLONASE) 50 mcg/actuation nasal spray Flonase Allergy Relief Take 1 spray(s) (nasal) 2 times per day for 7 days 20221027 spray,suspension 2 times per day nasal 7 days active 50 mcg/actuation    fluvoxamine maleate (FLUVOXAMINE ORAL) fluvoxamine Take No date recorded No form recorded No frequency recorded No route recorded No set duration recorded No set duration amount recorded active No dosage strength recorded No dosage strength units of measure recorded    furosemide (LASIX) 40 mg, Daily    GABAPENTIN ORAL 900 mg    hydroCHLOROthiazide (MICROZIDE) 12.5 mg, Daily    meclizine (ANTIVERT) 12.5 mg, 2 times daily PRN    METOPROLOL SUCCINATE ORAL metoprolol succinate Take No date recorded No form recorded No frequency recorded No route recorded No set duration recorded No set duration amount recorded active No dosage strength recorded No dosage strength units of measure recorded    OLANZapine (ZYPREXA) 20 mg, Nightly    simvastatin (ZOCOR) 10 MG tablet simvastatin Take No date recorded No form recorded No frequency recorded No route recorded No set duration recorded No set duration amount recorded active No dosage strength recorded No dosage strength units of measure recorded       Review of patient's allergies indicates:  No Known Allergies     Patient Care Team:  [facility] as PCP - General     Subjective:     Review of Systems   Constitutional:  Negative for activity change, appetite change, chills, diaphoresis, fatigue and fever.   HENT:  Negative for congestion, ear pain, tinnitus and trouble swallowing.    Eyes:  Negative for photophobia and pain.   Respiratory:  Negative for apnea, cough, choking, chest tightness, shortness of breath and  stridor.    Cardiovascular:  Negative for chest pain, palpitations and leg swelling.   Endocrine: Negative for cold intolerance and heat intolerance.   Genitourinary:  Negative for difficulty urinating, dysuria, enuresis, flank pain, frequency and hematuria.   Musculoskeletal:  Negative for arthralgias, back pain and gait problem.   Neurological:  Negative for dizziness, seizures, syncope, facial asymmetry, speech difficulty, weakness, light-headedness, numbness and headaches.   Psychiatric/Behavioral:  Negative for agitation, behavioral problems, confusion and decreased concentration.    All other systems reviewed and are negative.      12 point review of systems conducted, negative except as stated in the history of present illness. See HPI for details.    Objective:     There were no vitals taken for this visit.    Physical Exam  Constitutional:       General: He is not in acute distress.     Appearance: Normal appearance. He is well-developed and normal weight. He is not ill-appearing, toxic-appearing or diaphoretic.   HENT:      Head: Normocephalic and atraumatic.      Right Ear: Hearing and external ear normal.      Left Ear: Hearing and external ear normal.      Nose: No congestion or rhinorrhea.      Mouth/Throat:      Mouth: Mucous membranes are moist.      Pharynx: Oropharynx is clear. No oropharyngeal exudate.   Eyes:      General: Lids are normal. Gaze aligned appropriately. No scleral icterus.     Extraocular Movements: Extraocular movements intact.      Right eye: Normal extraocular motion and no nystagmus.      Left eye: Normal extraocular motion and no nystagmus.      Conjunctiva/sclera: Conjunctivae normal.      Right eye: Right conjunctiva is not injected.      Left eye: Left conjunctiva is not injected.      Pupils: Pupils are equal, round, and reactive to light.   Neck:      Vascular: No carotid bruit or JVD.      Trachea: Trachea and phonation normal.   Cardiovascular:      Rate and Rhythm:  Normal rate and regular rhythm.      Pulses: Normal pulses.      Heart sounds: Normal heart sounds. No murmur heard.     No friction rub. No gallop.   Pulmonary:      Effort: Pulmonary effort is normal. No tachypnea, accessory muscle usage, respiratory distress or retractions.      Breath sounds: Normal breath sounds and air entry. No stridor or decreased air movement. No wheezing or rhonchi.   Chest:      Chest wall: No mass, deformity, swelling, tenderness or crepitus.   Abdominal:      General: Abdomen is flat. Bowel sounds are normal. There is no distension. There are no signs of injury.      Palpations: Abdomen is soft. There is no shifting dullness, fluid wave, hepatomegaly, splenomegaly or mass.      Tenderness: There is no abdominal tenderness. There is no guarding or rebound.      Hernia: A hernia is present.   Musculoskeletal:         General: No swelling or tenderness.      Cervical back: Normal range of motion and neck supple. No rigidity, tenderness or crepitus.   Lymphadenopathy:      Head:      Right side of head: No submental, submandibular or occipital adenopathy.      Left side of head: No submental, submandibular or occipital adenopathy.      Cervical: No cervical adenopathy.      Right cervical: No superficial or posterior cervical adenopathy.     Left cervical: No superficial or posterior cervical adenopathy.      Upper Body:      Right upper body: No supraclavicular or axillary adenopathy.      Left upper body: No supraclavicular or axillary adenopathy.      Lower Body: No right inguinal adenopathy. No left inguinal adenopathy.   Skin:     General: Skin is warm.      Capillary Refill: Capillary refill takes less than 2 seconds.      Coloration: Skin is not cyanotic, jaundiced, mottled or pale.      Findings: No bruising, ecchymosis, erythema, lesion, petechiae or rash.      Nails: There is no clubbing.   Neurological:      General: No focal deficit present.      Mental Status: He is alert and  oriented to person, place, and time.      Cranial Nerves: No cranial nerve deficit or facial asymmetry.      Sensory: No sensory deficit.      Motor: No weakness, tremor, atrophy or abnormal muscle tone.      Coordination: Coordination normal.      Gait: Gait normal.      Deep Tendon Reflexes: Reflexes normal.   Psychiatric:         Attention and Perception: Attention and perception normal.         Mood and Affect: Mood normal. Mood is not anxious or depressed. Affect is not blunt or flat.         Speech: Speech normal. Speech is not slurred.         Behavior: Behavior normal. Behavior is cooperative.         Assessment/Plan:     5 cm epigastric incisional hernia    Schedule laparoscopic/robotic epigastric incisional hernia repair with mesh    The risks and benefits of the procedure were explained in detail using layman terms, including the pros and cons of any implant that may be used, all questions were addressed, the patient gives consent to proceed    Jose Delacruz MD  Surgical Oncology  Complex General, Gastrointestinal and Hepatobiliary Surgery    No follow-ups on file. In addition to their scheduled follow up, the patient has also been instructed to follow up on as needed basis.     Future Appointments   Date Time Provider Department Center   10/22/2024  2:00 PM Jose Delacruz MD [facility]   3/28/2025  9:00 AM LAB, [facility] LAB [facility]   4/3/2025  2:00 PM Anamaria Rangel FNP [facility]        Helen Ramires NP

## 2024-10-16 NOTE — PROGRESS NOTES
Surgical Oncology Clinic    Patient ID: [id number]     Chief Complaint: Umbilical hernia       HPI:     Trey Hancock is a 59 y.o. male here today for evaluation [facility]. He is status post lap Nissen (2000) and ex lap for bowel obstruction (2010) with subsequent post op hematoma requiring intervention. Patient later developed incisional hernia.He was evaluated in May 2021 by Dr Luke and weight loss was recommended. CT abdomen and pelvis with contrast 7/10/24 revealed fat-containing supraumbilical hernia and fat-containing paraumbilical hernia. No bowel containing hernia.     Past Medical History:   Diagnosis Date    Anemia     Anxiety     Arthritis of knee, right     Bowel obstruction     Chronic pancreatitis     Depression     HLD (hyperlipidemia)     Hypertension     Lumbar degenerative disc disease     PTSD (post-traumatic stress disorder)     Schizophrenia     Sleep apnea         Past Surgical History:   Procedure Laterality Date    abdominal wall hematoma drainage      EXPLORATORY LAPAROTOMY      LAPAROSCOPIC NISSEN FUNDOPLICATION      ROTATOR CUFF REPAIR Left     VARICOSE VEIN SURGERY Left     leg        Social History     Tobacco Use    Smoking status: Never    Smokeless tobacco: Never   Substance and Sexual Activity    Alcohol use: Not on file    Drug use: Not on file    Sexual activity: Not on file        Current Outpatient Medications   Medication Instructions    amitriptyline (ELAVIL) 100 mg    ASCORBIC ACID, BULK, MISC 500 mg, Daily    busPIRone (BUSPAR) 10 MG tablet buspirone Take No date recorded No form recorded No frequency recorded No route recorded No set duration recorded No set duration amount recorded active No dosage strength recorded No dosage strength units of measure recorded    calcium carbonate 650 mg, Daily    citalopram (CELEXA) 40 mg, Every morning    clonazePAM (KLONOPIN) 2 mg, 2 times daily PRN    CLONIDINE HCL ORAL clonidine Take No date recorded  No form recorded No frequency recorded No route recorded No set duration recorded No set duration amount recorded active No dosage strength recorded No dosage strength units of measure recorded    divalproex ER (DEPAKOTE ER) 500 mg, Daily    fluticasone propionate (FLONASE) 50 mcg/actuation nasal spray Flonase Allergy Relief Take 1 spray(s) (nasal) 2 times per day for 7 days 20221027 spray,suspension 2 times per day nasal 7 days active 50 mcg/actuation    fluvoxamine maleate (FLUVOXAMINE ORAL) fluvoxamine Take No date recorded No form recorded No frequency recorded No route recorded No set duration recorded No set duration amount recorded active No dosage strength recorded No dosage strength units of measure recorded    furosemide (LASIX) 40 mg, Daily    GABAPENTIN ORAL 900 mg    hydroCHLOROthiazide (MICROZIDE) 12.5 mg, Daily    meclizine (ANTIVERT) 12.5 mg, 2 times daily PRN    METOPROLOL SUCCINATE ORAL metoprolol succinate Take No date recorded No form recorded No frequency recorded No route recorded No set duration recorded No set duration amount recorded active No dosage strength recorded No dosage strength units of measure recorded    OLANZapine (ZYPREXA) 20 mg, Nightly    simvastatin (ZOCOR) 10 MG tablet simvastatin Take No date recorded No form recorded No frequency recorded No route recorded No set duration recorded No set duration amount recorded active No dosage strength recorded No dosage strength units of measure recorded       Review of patient's allergies indicates:  No Known Allergies     Patient Care Team:  [facility] as PCP - General     Subjective:     Review of Systems   Constitutional:  Negative for activity change, appetite change, chills, diaphoresis, fatigue and fever.   HENT:  Negative for congestion, ear pain, tinnitus and trouble swallowing.    Eyes:  Negative for photophobia and pain.   Respiratory:  Negative for apnea, cough, choking, chest tightness, shortness of breath and  stridor.    Cardiovascular:  Negative for chest pain, palpitations and leg swelling.   Endocrine: Negative for cold intolerance and heat intolerance.   Genitourinary:  Negative for difficulty urinating, dysuria, enuresis, flank pain, frequency and hematuria.   Musculoskeletal:  Negative for arthralgias, back pain and gait problem.   Neurological:  Negative for dizziness, seizures, syncope, facial asymmetry, speech difficulty, weakness, light-headedness, numbness and headaches.   Psychiatric/Behavioral:  Negative for agitation, behavioral problems, confusion and decreased concentration.    All other systems reviewed and are negative.      12 point review of systems conducted, negative except as stated in the history of present illness. See HPI for details.    Objective:     There were no vitals taken for this visit.    Physical Exam  Constitutional:       General: He is not in acute distress.     Appearance: Normal appearance. He is well-developed and normal weight. He is not ill-appearing, toxic-appearing or diaphoretic.   HENT:      Head: Normocephalic and atraumatic.      Right Ear: Hearing and external ear normal.      Left Ear: Hearing and external ear normal.      Nose: No congestion or rhinorrhea.      Mouth/Throat:      Mouth: Mucous membranes are moist.      Pharynx: Oropharynx is clear. No oropharyngeal exudate.   Eyes:      General: Lids are normal. Gaze aligned appropriately. No scleral icterus.     Extraocular Movements: Extraocular movements intact.      Right eye: Normal extraocular motion and no nystagmus.      Left eye: Normal extraocular motion and no nystagmus.      Conjunctiva/sclera: Conjunctivae normal.      Right eye: Right conjunctiva is not injected.      Left eye: Left conjunctiva is not injected.      Pupils: Pupils are equal, round, and reactive to light.   Neck:      Vascular: No carotid bruit or JVD.      Trachea: Trachea and phonation normal.   Cardiovascular:      Rate and Rhythm:  Normal rate and regular rhythm.      Pulses: Normal pulses.      Heart sounds: Normal heart sounds. No murmur heard.     No friction rub. No gallop.   Pulmonary:      Effort: Pulmonary effort is normal. No tachypnea, accessory muscle usage, respiratory distress or retractions.      Breath sounds: Normal breath sounds and air entry. No stridor or decreased air movement. No wheezing or rhonchi.   Chest:      Chest wall: No mass, deformity, swelling, tenderness or crepitus.   Abdominal:      General: Abdomen is flat. Bowel sounds are normal. There is no distension. There are no signs of injury.      Palpations: Abdomen is soft. There is no shifting dullness, fluid wave, hepatomegaly, splenomegaly or mass.      Tenderness: There is no abdominal tenderness. There is no guarding or rebound.      Hernia: A hernia is present.   Musculoskeletal:         General: No swelling or tenderness.      Cervical back: Normal range of motion and neck supple. No rigidity, tenderness or crepitus.   Lymphadenopathy:      Head:      Right side of head: No submental, submandibular or occipital adenopathy.      Left side of head: No submental, submandibular or occipital adenopathy.      Cervical: No cervical adenopathy.      Right cervical: No superficial or posterior cervical adenopathy.     Left cervical: No superficial or posterior cervical adenopathy.      Upper Body:      Right upper body: No supraclavicular or axillary adenopathy.      Left upper body: No supraclavicular or axillary adenopathy.      Lower Body: No right inguinal adenopathy. No left inguinal adenopathy.   Skin:     General: Skin is warm.      Capillary Refill: Capillary refill takes less than 2 seconds.      Coloration: Skin is not cyanotic, jaundiced, mottled or pale.      Findings: No bruising, ecchymosis, erythema, lesion, petechiae or rash.      Nails: There is no clubbing.   Neurological:      General: No focal deficit present.      Mental Status: He is alert and  oriented to person, place, and time.      Cranial Nerves: No cranial nerve deficit or facial asymmetry.      Sensory: No sensory deficit.      Motor: No weakness, tremor, atrophy or abnormal muscle tone.      Coordination: Coordination normal.      Gait: Gait normal.      Deep Tendon Reflexes: Reflexes normal.   Psychiatric:         Attention and Perception: Attention and perception normal.         Mood and Affect: Mood normal. Mood is not anxious or depressed. Affect is not blunt or flat.         Speech: Speech normal. Speech is not slurred.         Behavior: Behavior normal. Behavior is cooperative.         Assessment/Plan:     5 cm epigastric incisional hernia    Schedule laparoscopic/robotic epigastric incisional hernia repair with mesh    The risks and benefits of the procedure were explained in detail using layman terms, including the pros and cons of any implant that may be used, all questions were addressed, the patient gives consent to proceed    Jose Delacruz MD  Surgical Oncology  Complex General, Gastrointestinal and Hepatobiliary Surgery    No follow-ups on file. In addition to their scheduled follow up, the patient has also been instructed to follow up on as needed basis.     Future Appointments   Date Time Provider Department Center   10/22/2024  2:00 PM Jose Delacruz MD [facility]   3/28/2025  9:00 AM LAB, [facility] LAB [facility]   4/3/2025  2:00 PM [facility]angel F[facility]        Helen Ramires NP

## 2024-10-22 ENCOUNTER — OFFICE VISIT (OUTPATIENT)
Dept: SURGICAL ONCOLOGY | Facility: CLINIC | Age: 59
End: 2024-10-22
Payer: OTHER GOVERNMENT

## 2024-10-22 VITALS
DIASTOLIC BLOOD PRESSURE: 78 MMHG | TEMPERATURE: 98 F | SYSTOLIC BLOOD PRESSURE: 132 MMHG | HEIGHT: 66 IN | WEIGHT: 226.19 LBS | BODY MASS INDEX: 36.35 KG/M2 | HEART RATE: 94 BPM

## 2024-10-22 DIAGNOSIS — K42.9 UMBILICAL HERNIA: ICD-10-CM

## 2024-10-22 DIAGNOSIS — K43.2 INCISIONAL HERNIA, WITHOUT OBSTRUCTION OR GANGRENE: Primary | ICD-10-CM

## 2024-10-22 DIAGNOSIS — K43.2 INCISIONAL HERNIA: ICD-10-CM

## 2024-10-22 PROCEDURE — 99215 OFFICE O/P EST HI 40 MIN: CPT | Mod: PBBFAC | Performed by: SURGERY

## 2024-10-22 PROCEDURE — 99999 PR PBB SHADOW E&M-EST. PATIENT-LVL V: CPT | Mod: PBBFAC,,, | Performed by: SURGERY

## 2024-10-22 PROCEDURE — 99203 OFFICE O/P NEW LOW 30 MIN: CPT | Mod: S$PBB,,, | Performed by: SURGERY

## 2024-10-22 RX ORDER — SODIUM CHLORIDE 9 MG/ML
INJECTION, SOLUTION INTRAVENOUS CONTINUOUS
OUTPATIENT
Start: 2024-10-22

## 2024-10-22 RX ORDER — ACETAMINOPHEN 325 MG/1
2 TABLET ORAL EVERY 6 HOURS PRN
COMMUNITY
Start: 2024-07-01

## 2024-10-22 RX ORDER — CEFAZOLIN SODIUM 2 G/50ML
2 SOLUTION INTRAVENOUS
OUTPATIENT
Start: 2024-10-22

## 2024-10-22 RX ORDER — ENOXAPARIN SODIUM 100 MG/ML
30 INJECTION SUBCUTANEOUS EVERY 24 HOURS
OUTPATIENT
Start: 2024-10-22

## 2024-11-06 ENCOUNTER — ANESTHESIA EVENT (OUTPATIENT)
Dept: SURGERY | Facility: HOSPITAL | Age: 59
End: 2024-11-06
Payer: OTHER GOVERNMENT

## 2024-11-06 RX ORDER — MULTIVITAMIN
1 TABLET ORAL DAILY
COMMUNITY

## 2024-11-06 RX ORDER — LISINOPRIL 40 MG/1
20 TABLET ORAL DAILY
COMMUNITY

## 2024-11-06 RX ORDER — TRIHEXYPHENIDYL HYDROCHLORIDE 2 MG/1
2 TABLET ORAL 2 TIMES DAILY WITH MEALS
COMMUNITY

## 2024-11-06 RX ORDER — FERROUS GLUCONATE 324(38)MG
324 TABLET ORAL NIGHTLY
COMMUNITY

## 2024-11-06 RX ORDER — AMOXICILLIN 250 MG
2 CAPSULE ORAL 2 TIMES DAILY
COMMUNITY

## 2024-11-06 RX ORDER — CHOLECALCIFEROL (VITAMIN D3) 25 MCG
1000 TABLET ORAL DAILY
COMMUNITY

## 2024-11-06 RX ORDER — METOPROLOL TARTRATE 50 MG/1
25 TABLET ORAL 2 TIMES DAILY
COMMUNITY

## 2024-11-06 RX ORDER — METHOCARBAMOL 750 MG/1
750 TABLET, FILM COATED ORAL 2 TIMES DAILY PRN
COMMUNITY

## 2024-11-06 RX ORDER — PRAMIPEXOLE DIHYDROCHLORIDE 1 MG/1
2 TABLET ORAL NIGHTLY
COMMUNITY

## 2024-11-06 RX ORDER — LORAZEPAM 1 MG/1
0.5 TABLET ORAL EVERY 8 HOURS PRN
COMMUNITY

## 2024-11-06 RX ORDER — QUETIAPINE FUMARATE 25 MG/1
25 TABLET, FILM COATED ORAL NIGHTLY
COMMUNITY

## 2024-11-06 RX ORDER — POTASSIUM CHLORIDE 20 MEQ/1
20 TABLET, EXTENDED RELEASE ORAL DAILY
COMMUNITY

## 2024-11-06 RX ORDER — CARBOXYMETHYLCELLULOSE SODIUM 5 MG/ML
1 SOLUTION/ DROPS OPHTHALMIC 3 TIMES DAILY PRN
COMMUNITY

## 2024-11-21 ENCOUNTER — HOSPITAL ENCOUNTER (OUTPATIENT)
Facility: HOSPITAL | Age: 59
Discharge: HOME | End: 2024-11-21
Attending: SURGERY | Admitting: SURGERY
Payer: OTHER GOVERNMENT

## 2024-11-21 ENCOUNTER — ANESTHESIA (OUTPATIENT)
Dept: SURGERY | Facility: HOSPITAL | Age: 59
End: 2024-11-21
Payer: OTHER GOVERNMENT

## 2024-11-21 VITALS
RESPIRATION RATE: 16 BRPM | HEART RATE: 92 BPM | OXYGEN SATURATION: 100 % | BODY MASS INDEX: 39.08 KG/M2 | DIASTOLIC BLOOD PRESSURE: 79 MMHG | WEIGHT: 243.19 LBS | HEIGHT: 66 IN | TEMPERATURE: 98 F | SYSTOLIC BLOOD PRESSURE: 123 MMHG

## 2024-11-21 DIAGNOSIS — K43.2 INCISIONAL HERNIA, WITHOUT OBSTRUCTION OR GANGRENE: ICD-10-CM

## 2024-11-21 DIAGNOSIS — K43.2 INCISIONAL HERNIA: ICD-10-CM

## 2024-11-21 PROCEDURE — D9220A PRA ANESTHESIA: Mod: ANES,,, | Performed by: ANESTHESIOLOGY

## 2024-11-21 PROCEDURE — 37000008 HC ANESTHESIA 1ST 15 MINUTES: Performed by: SURGERY

## 2024-11-21 PROCEDURE — C1781 MESH (IMPLANTABLE): HCPCS | Performed by: SURGERY

## 2024-11-21 PROCEDURE — 71000015 HC POSTOP RECOV 1ST HR: Performed by: SURGERY

## 2024-11-21 PROCEDURE — 63600175 PHARM REV CODE 636 W HCPCS: Performed by: NURSE ANESTHETIST, CERTIFIED REGISTERED

## 2024-11-21 PROCEDURE — 71000016 HC POSTOP RECOV ADDL HR: Performed by: SURGERY

## 2024-11-21 PROCEDURE — 49593 RPR AA HRN 1ST 3-10 RDC: CPT | Mod: ,,, | Performed by: SURGERY

## 2024-11-21 PROCEDURE — 25000003 PHARM REV CODE 250: Performed by: NURSE ANESTHETIST, CERTIFIED REGISTERED

## 2024-11-21 PROCEDURE — 27201423 OPTIME MED/SURG SUP & DEVICES STERILE SUPPLY: Performed by: SURGERY

## 2024-11-21 PROCEDURE — 63600175 PHARM REV CODE 636 W HCPCS: Mod: JZ,JG | Performed by: SURGERY

## 2024-11-21 PROCEDURE — 63600175 PHARM REV CODE 636 W HCPCS: Performed by: SURGERY

## 2024-11-21 PROCEDURE — 63600175 PHARM REV CODE 636 W HCPCS: Performed by: ANESTHESIOLOGY

## 2024-11-21 PROCEDURE — 25000003 PHARM REV CODE 250: Performed by: NURSE PRACTITIONER

## 2024-11-21 PROCEDURE — D9220A PRA ANESTHESIA: Mod: CRNA,,, | Performed by: NURSE ANESTHETIST, CERTIFIED REGISTERED

## 2024-11-21 PROCEDURE — 36000711: Performed by: SURGERY

## 2024-11-21 PROCEDURE — 71000033 HC RECOVERY, INTIAL HOUR: Performed by: SURGERY

## 2024-11-21 PROCEDURE — 37000009 HC ANESTHESIA EA ADD 15 MINS: Performed by: SURGERY

## 2024-11-21 PROCEDURE — 36000710: Performed by: SURGERY

## 2024-11-21 DEVICE — GORE SYNECOR INTRAPERITONEAL 12CM CIRCULAR BIOMATERIAL
Type: IMPLANTABLE DEVICE | Site: ABDOMEN | Status: FUNCTIONAL
Brand: GORE SYNECOR BIOMATERIAL

## 2024-11-21 RX ORDER — ONDANSETRON HYDROCHLORIDE 2 MG/ML
INJECTION, SOLUTION INTRAVENOUS
Status: DISCONTINUED | OUTPATIENT
Start: 2024-11-21 | End: 2024-11-21

## 2024-11-21 RX ORDER — ENOXAPARIN SODIUM 100 MG/ML
30 INJECTION SUBCUTANEOUS EVERY 24 HOURS
Status: DISCONTINUED | OUTPATIENT
Start: 2024-11-21 | End: 2024-11-21 | Stop reason: HOSPADM

## 2024-11-21 RX ORDER — MEPERIDINE HYDROCHLORIDE 25 MG/ML
12.5 INJECTION INTRAMUSCULAR; INTRAVENOUS; SUBCUTANEOUS ONCE
Status: DISCONTINUED | OUTPATIENT
Start: 2024-11-21 | End: 2024-11-21 | Stop reason: HOSPADM

## 2024-11-21 RX ORDER — HYDROMORPHONE HYDROCHLORIDE 2 MG/ML
0.2 INJECTION, SOLUTION INTRAMUSCULAR; INTRAVENOUS; SUBCUTANEOUS EVERY 5 MIN PRN
Status: DISCONTINUED | OUTPATIENT
Start: 2024-11-21 | End: 2024-11-21 | Stop reason: HOSPADM

## 2024-11-21 RX ORDER — SODIUM CHLORIDE 9 MG/ML
INJECTION, SOLUTION INTRAVENOUS CONTINUOUS PRN
Status: DISCONTINUED | OUTPATIENT
Start: 2024-11-21 | End: 2024-11-21

## 2024-11-21 RX ORDER — HYDROCODONE BITARTRATE AND ACETAMINOPHEN 5; 325 MG/1; MG/1
1 TABLET ORAL EVERY 6 HOURS PRN
Qty: 10 TABLET | Refills: 0 | Status: SHIPPED | OUTPATIENT
Start: 2024-11-21

## 2024-11-21 RX ORDER — FENTANYL CITRATE 50 UG/ML
INJECTION, SOLUTION INTRAMUSCULAR; INTRAVENOUS
Status: DISCONTINUED | OUTPATIENT
Start: 2024-11-21 | End: 2024-11-21

## 2024-11-21 RX ORDER — KETOROLAC TROMETHAMINE 30 MG/ML
INJECTION, SOLUTION INTRAMUSCULAR; INTRAVENOUS
Status: DISCONTINUED | OUTPATIENT
Start: 2024-11-21 | End: 2024-11-21

## 2024-11-21 RX ORDER — MIDAZOLAM HYDROCHLORIDE 1 MG/ML
INJECTION INTRAMUSCULAR; INTRAVENOUS
Status: DISCONTINUED | OUTPATIENT
Start: 2024-11-21 | End: 2024-11-21

## 2024-11-21 RX ORDER — GLYCOPYRROLATE 0.2 MG/ML
INJECTION INTRAMUSCULAR; INTRAVENOUS
Status: DISCONTINUED | OUTPATIENT
Start: 2024-11-21 | End: 2024-11-21

## 2024-11-21 RX ORDER — ONDANSETRON 4 MG/1
4 TABLET, ORALLY DISINTEGRATING ORAL EVERY 6 HOURS PRN
Status: DISCONTINUED | OUTPATIENT
Start: 2024-11-21 | End: 2024-11-21 | Stop reason: HOSPADM

## 2024-11-21 RX ORDER — PROPOFOL 10 MG/ML
VIAL (ML) INTRAVENOUS
Status: DISCONTINUED | OUTPATIENT
Start: 2024-11-21 | End: 2024-11-21

## 2024-11-21 RX ORDER — ONDANSETRON HYDROCHLORIDE 2 MG/ML
4 INJECTION, SOLUTION INTRAVENOUS ONCE
Status: DISCONTINUED | OUTPATIENT
Start: 2024-11-21 | End: 2024-11-21 | Stop reason: HOSPADM

## 2024-11-21 RX ORDER — DIPHENHYDRAMINE HYDROCHLORIDE 50 MG/ML
25 INJECTION INTRAMUSCULAR; INTRAVENOUS EVERY 6 HOURS PRN
Status: DISCONTINUED | OUTPATIENT
Start: 2024-11-21 | End: 2024-11-21 | Stop reason: HOSPADM

## 2024-11-21 RX ORDER — SODIUM CHLORIDE 9 MG/ML
INJECTION, SOLUTION INTRAVENOUS CONTINUOUS
Status: DISCONTINUED | OUTPATIENT
Start: 2024-11-21 | End: 2024-11-21 | Stop reason: HOSPADM

## 2024-11-21 RX ORDER — CEFAZOLIN SODIUM 2 G/50ML
2 SOLUTION INTRAVENOUS
Status: COMPLETED | OUTPATIENT
Start: 2024-11-21 | End: 2024-11-21

## 2024-11-21 RX ORDER — ACETAMINOPHEN 10 MG/ML
INJECTION, SOLUTION INTRAVENOUS
Status: DISCONTINUED | OUTPATIENT
Start: 2024-11-21 | End: 2024-11-21

## 2024-11-21 RX ORDER — BUPIVACAINE HYDROCHLORIDE 5 MG/ML
INJECTION, SOLUTION EPIDURAL; INTRACAUDAL
Status: DISCONTINUED | OUTPATIENT
Start: 2024-11-21 | End: 2024-11-21 | Stop reason: HOSPADM

## 2024-11-21 RX ORDER — DEXAMETHASONE SODIUM PHOSPHATE 4 MG/ML
INJECTION, SOLUTION INTRA-ARTICULAR; INTRALESIONAL; INTRAMUSCULAR; INTRAVENOUS; SOFT TISSUE
Status: DISCONTINUED | OUTPATIENT
Start: 2024-11-21 | End: 2024-11-21

## 2024-11-21 RX ORDER — ROCURONIUM BROMIDE 10 MG/ML
INJECTION, SOLUTION INTRAVENOUS
Status: DISCONTINUED | OUTPATIENT
Start: 2024-11-21 | End: 2024-11-21

## 2024-11-21 RX ORDER — HYDROMORPHONE HYDROCHLORIDE 2 MG/ML
0.5 INJECTION, SOLUTION INTRAMUSCULAR; INTRAVENOUS; SUBCUTANEOUS EVERY 5 MIN PRN
Status: DISCONTINUED | OUTPATIENT
Start: 2024-11-21 | End: 2024-11-21 | Stop reason: HOSPADM

## 2024-11-21 RX ORDER — HYDROCODONE BITARTRATE AND ACETAMINOPHEN 5; 325 MG/1; MG/1
1 TABLET ORAL EVERY 6 HOURS PRN
Status: DISCONTINUED | OUTPATIENT
Start: 2024-11-21 | End: 2024-11-21 | Stop reason: HOSPADM

## 2024-11-21 RX ORDER — GLUCAGON 1 MG
1 KIT INJECTION
Status: DISCONTINUED | OUTPATIENT
Start: 2024-11-21 | End: 2024-11-21 | Stop reason: HOSPADM

## 2024-11-21 RX ADMIN — PROPOFOL 150 MG: 10 INJECTION, EMULSION INTRAVENOUS at 01:11

## 2024-11-21 RX ADMIN — ONDANSETRON 4 MG: 2 INJECTION INTRAMUSCULAR; INTRAVENOUS at 01:11

## 2024-11-21 RX ADMIN — ACETAMINOPHEN 1000 MG: 10 INJECTION, SOLUTION INTRAVENOUS at 02:11

## 2024-11-21 RX ADMIN — ROCURONIUM BROMIDE 20 MG: 10 SOLUTION INTRAVENOUS at 02:11

## 2024-11-21 RX ADMIN — SODIUM CHLORIDE: 9 INJECTION, SOLUTION INTRAVENOUS at 01:11

## 2024-11-21 RX ADMIN — FENTANYL CITRATE 100 MCG: 50 INJECTION, SOLUTION INTRAMUSCULAR; INTRAVENOUS at 01:11

## 2024-11-21 RX ADMIN — GLYCOPYRROLATE 0.2 MG: 0.2 INJECTION INTRAMUSCULAR; INTRAVENOUS at 01:11

## 2024-11-21 RX ADMIN — HYDROMORPHONE HYDROCHLORIDE 0.5 MG: 2 INJECTION, SOLUTION INTRAMUSCULAR; INTRAVENOUS; SUBCUTANEOUS at 03:11

## 2024-11-21 RX ADMIN — ROCURONIUM BROMIDE 60 MG: 10 SOLUTION INTRAVENOUS at 01:11

## 2024-11-21 RX ADMIN — KETOROLAC TROMETHAMINE 30 MG: 30 INJECTION, SOLUTION INTRAMUSCULAR; INTRAVENOUS at 02:11

## 2024-11-21 RX ADMIN — ENOXAPARIN SODIUM 30 MG: 30 INJECTION SUBCUTANEOUS at 10:11

## 2024-11-21 RX ADMIN — SUGAMMADEX 200 MG: 100 INJECTION, SOLUTION INTRAVENOUS at 02:11

## 2024-11-21 RX ADMIN — MIDAZOLAM HYDROCHLORIDE 2 MG: 1 INJECTION, SOLUTION INTRAMUSCULAR; INTRAVENOUS at 01:11

## 2024-11-21 RX ADMIN — CEFAZOLIN SODIUM 2 G: 2 SOLUTION INTRAVENOUS at 01:11

## 2024-11-21 RX ADMIN — DEXAMETHASONE SODIUM PHOSPHATE 4 MG: 4 INJECTION, SOLUTION INTRA-ARTICULAR; INTRALESIONAL; INTRAMUSCULAR; INTRAVENOUS; SOFT TISSUE at 01:11

## 2024-11-21 RX ADMIN — HYDROCODONE BITARTRATE AND ACETAMINOPHEN 1 TABLET: 5; 325 TABLET ORAL at 05:11

## 2024-11-21 NOTE — TRANSFER OF CARE
"Anesthesia Transfer of Care Note    Patient: Trey Hancock    Procedure(s) Performed: Procedure(s) (LRB):  ROBOTIC REPAIR, HERNIA, INCISIONAL (N/A)    Patient location: PACU    Anesthesia Type: general    Transport from OR: Transported from OR on room air with adequate spontaneous ventilation    Post pain: adequate analgesia    Post assessment: no apparent anesthetic complications    Post vital signs: stable    Level of consciousness: responds to stimulation    Nausea/Vomiting: no nausea/vomiting    Complications: none    Transfer of care protocol was followed    Last vitals: Visit Vitals  /74   Pulse 77   Temp 36.8 °C (98.2 °F)   Resp 18   Ht 5' 6" (1.676 m)   Wt 110.3 kg (243 lb 2.7 oz)   SpO2 97%   BMI 39.25 kg/m²     "

## 2024-11-21 NOTE — ANESTHESIA PROCEDURE NOTES
Intubation    Date/Time: 11/21/2024 1:41 PM    Performed by: Isaías Aguillon CRNA  Authorized by: Shala Kirkpatrick MD    Intubation:     Induction:  Intravenous    Intubated:  Postinduction    Mask Ventilation:  Easy with oral airway    Attempts:  1    Attempted By:  CRNA    Method of Intubation:  Direct    Blade:  Soria 2    Laryngeal View Grade: Grade IIA - cords partially seen      Difficult Airway Encountered?: No      Complications:  None    Airway Device:  Oral endotracheal tube    Airway Device Size:  7.5    Style/Cuff Inflation:  Cuffed (inflated to minimal occlusive pressure)    Inflation Amount (mL):  6    Tube secured:  21    Secured at:  The lips    Placement Verified By:  Capnometry    Complicating Factors:  None    Findings Post-Intubation:  BS equal bilateral and atraumatic/condition of teeth unchanged

## 2024-11-21 NOTE — HOSPITAL COURSE
Patient tolerated laparoscopic/robotic incisional hernia repair without complications. Stable for discharge home.

## 2024-11-21 NOTE — DISCHARGE SUMMARY
"[facility] - Periop Services  General Surgery  Discharge Summary      Patient Name: Trey Hancock  MRN: [id number]  Admission Date: 11/21/2024  Hospital Length of Stay: 0 days  Discharge Date and Time:  11/21/2024 3:07 PM  Attending Physician: Jose Delacruz MD   Discharging Provider: Helen Ramires NP  [facility]    HPI:   No notes on file    Procedure(s) (LRB):  ROBOTIC REPAIR, HERNIA, INCISIONAL (N/A)      Indwelling Lines/Drains at time of discharge:   Lines/Drains/Airways       None                 Hospital Course: Patient tolerated laparoscopic/robotic incisional hernia repair without complications. Stable for discharge home.     Goals of Care Treatment Preferences:         Consults:     Significant Diagnostic Studies: Labs: BMP: No results for input(s): "GLU", "NA", "K", "CL", "CO2", "BUN", "CREATININE", "CALCIUM", "MG" in the last 48 hours. and CBC No results for input(s): "WBC", "HGB", "HCT", "PLT" in the last 48 hours.    Pending Diagnostic Studies:       None          Final Active Diagnoses:    Diagnosis Date Noted POA    PRINCIPAL PROBLEM:  Incisional hernia, without obstruction or gangrene [K43.2] 11/21/2024 Yes      Problems Resolved During this Admission:      Discharged Condition: good    Disposition: Home or Self Care    Follow Up:   Follow-up Information       Jose Delacruz MD Follow up in 2 week(s).    Specialty: Surgical Oncology  Why: Post Op Appt  Contact information:  [facility]  [address] [place] [id number] [phone]                           Patient Instructions:      Diet Adult Regular     Lifting restrictions   Order Comments: Do not lift objects heavier than 10 pounds until cleared by Dr Delacruz     No driving until:   Order Comments: You can drive 4-5 days post op. Do not drive while taking pain medication.     No dressing needed   Order Comments: Leave lap sites open to air. Glue will fall off, no need to remove. No " tub baths or soaking for 4 weeks. Ok for shower starting 24 hours after surgery.     Medications:  Reconciled Home Medications:      Medication List        START taking these medications      HYDROcodone-acetaminophen 5-325 mg per tablet  Commonly known as: NORCO  Take 1 tablet by mouth every 6 (six) hours as needed for Pain.            CONTINUE taking these medications      acetaminophen 325 MG tablet  Commonly known as: TYLENOL  Take 2 tablets by mouth every 6 (six) hours as needed for Pain.     amitriptyline 50 MG tablet  Commonly known as: ELAVIL  Take 50 mg by mouth every evening.     ASCORBIC ACID (BULK) MISC  Take 1,000 mg by mouth every evening.     busPIRone 15 MG tablet  Commonly known as: BUSPAR  Take 15 mg by mouth 2 (two) times daily.     calcium carbonate 260 mg calcium (650 mg) Chew  Take 2 tablets by mouth Daily.     carboxymethylcellulose 0.5 % Dpet  Commonly known as: REFRESH PLUS  Place 1 drop into both eyes 3 (three) times daily as needed (dry eye).     cloNIDine 0.1 MG tablet  Commonly known as: CATAPRES  Take 1 tablet by mouth once daily.     divalproex  MG Tb24 24 hr tablet  Commonly known as: DEPAKOTE ER  Take 500 mg by mouth every evening.     ferrous gluconate 324 MG tablet  Commonly known as: FERGON  Take 324 mg by mouth every evening.     fluvoxaMINE 100 MG tablet  Commonly known as: LUVOX  Take 100 mg by mouth 2 (two) times a day.     furosemide 40 MG tablet  Commonly known as: LASIX  Take 40 mg by mouth once daily.     gabapentin 300 MG capsule  Commonly known as: NEURONTIN  Take 300 mg by mouth 3 (three) times daily.     lisinopriL 40 MG tablet  Commonly known as: PRINIVIL,ZESTRIL  Take 20 mg by mouth once daily.     LORazepam 1 MG tablet  Commonly known as: ATIVAN  Take 0.5 mg by mouth every 8 (eight) hours as needed for Anxiety.     meclizine 12.5 mg tablet  Commonly known as: ANTIVERT  Take 12.5 mg by mouth 2 (two) times daily as needed for Dizziness.     methocarbamoL 750 MG  Tab  Commonly known as: ROBAXIN  Take 750 mg by mouth 2 (two) times daily as needed (pain).     metoprolol tartrate 50 MG tablet  Commonly known as: LOPRESSOR  Take 25 mg by mouth 2 (two) times daily.     multivitamin per tablet  Commonly known as: THERAGRAN  Take 1 tablet by mouth once daily.     OLANZapine 20 MG tablet  Commonly known as: ZyPREXA  Take 20 mg by mouth nightly.     potassium chloride SA 20 MEQ tablet  Commonly known as: K-DUR,KLOR-CON  Take 20 mEq by mouth once daily.     pramipexole 1 MG tablet  Commonly known as: MIRAPEX  Take 2 mg by mouth every evening.     QUEtiapine 25 MG Tab  Commonly known as: SEROQUEL  Take 25 mg by mouth every evening.     senna-docusate 8.6-50 mg 8.6-50 mg per tablet  Commonly known as: PERICOLACE  Take 2 tablets by mouth 2 (two) times a day.     simvastatin 40 MG tablet  Commonly known as: ZOCOR  Take 20 mg by mouth once daily.     timoloL 0.5 % ophthalmic solution  Commonly known as: BETIMOL  Place 1 drop into both eyes 2 (two) times daily.     trihexyphenidyL 2 MG tablet  Commonly known as: ARTANE  Take 2 mg by mouth 2 (two) times daily with meals.     vitamin D 1000 units Tab  Commonly known as: VITAMIN D3  Take 1,000 Units by mouth once daily.            ASK your doctor about these medications      citalopram 40 MG tablet  Commonly known as: CeleXA  Take 40 mg by mouth once daily.     clonazePAM 2 MG Tab  Commonly known as: KlonoPIN  Take 2 mg by mouth 2 (two) times daily as needed.     fluticasone propionate 50 mcg/actuation nasal spray  Commonly known as: FLONASE  Flonase Allergy Relief Take 1 spray(s) (nasal) 2 times per day for 7 days [id number] spray,suspension 2 times per day nasal 7 days active 50 mcg/actuation     hydroCHLOROthiazide 12.5 mg capsule  Commonly known as: MICROZIDE  Take 12.5 mg by mouth once daily.     METOPROLOL SUCCINATE ORAL  metoprolol succinate Take No date recorded No form recorded No frequency recorded No route recorded No set duration  recorded No set duration amount recorded active No dosage strength recorded No dosage strength units of measure recorded            Time spent on the discharge of patient: 30 minutes    Hleen Ramires NP  General Surgery  [facility] - Periop Services

## 2024-11-21 NOTE — DISCHARGE INSTRUCTIONS
NO DRIVING AND NO ALCOHOL consumption FOR 24 HOURS or while taking narcotic pain medications.    Keep sites CLEAN AND DRY for 24 hours. OK to shower afterwards. Do NOT submerge incision under water. Do not apply lotions, creams, or ointments.     NO heavy lifting. DO NOT lift objects greater than 10 lbs. Your doctor will advise at your follow up appointment when to resume normal activity.     Monitor for infection: chills, fever (100.4 F), redness or drainage at surgical site. Notify your doctor if any of these occur.     Report to your nearest ER if you experience any SUDDEN/SEVERE abdominal pain, trouble breathing, uncontrolled pain, profound weakness.      BLEEDING: if you experience uncontrollable bleeding, contact your doctor and go to ER.    NAUSEA: due to the anesthesia, you may experience nausea for up to 24 hours. If nausea and vomiting last longer, contact your doctor.     INFECTION:  watch for any signs or symptoms of infection such as chills, fever, redness or drainage at surgical site. Notify your doctor.     PAIN : take your pain medications as directed. If the pain medications are not helping, notify your doctor.

## 2024-11-26 NOTE — OP NOTE
Date:  11/21/2024    Surgeon:  Jose Delacruz MD    Assistant:  None    Preoperative Diagnosis:  Incisional hernias    Postoperative Diagnosis:  Incisional hernia    Procedure: Laparoscopic/robotic incisional repair with mesh    Anesthesia: GETA    Estimated Blood Loss: 15cc           Intraoperative findings:  Numerous incisional hernia defects along the upper midline, spanning a total of 5cm including all defects,  underlay mesh repair using a circular 12 cm Fairdealing IP permanent mesh    Procedure Details: After informed consent was obtained the patient was brought to the operating room placed in the supine position.  General endotracheal anesthesia was administered without difficulty.  The patient's abdomen was prepped and draped in sterile fashion.  After infiltration with local anesthesia right upper quadrant incision was made and an optical trocar was used to enter the peritoneal cavity under direct vision.  Pneumoperitoneum was achieved without difficulty.  Additional robotic ports were placed in the right lateral abdomen under direct vision.  The patient was tilted towards the left.  There were numerous small less than 2 cm Swiss cheese defects along the upper midline The contents were reduced along with the hernia sacs.  The preperitoneal fat and falciform ligament were taken down exposing the posterior fascia.  Insufflation was decreased to 8 mmHg.  Using an underlay preperitoneal approach, 12 cm  circular Fairdealing IP mesh was then selected and inserted into the abdominal cavity after being soaked in antimicrobial solution.  It was suspended to the repair using 3-0 absorbable V-lock.  It was then secured circumferentially using running 0 V-lock suture, with care to avoid any folding or redundancy of the mesh.  All defects were covered by the mesh.  The absorbable suture was then used to secure the interior of the mesh to the posterior abdominal wall eliminating the dead space.  The suture lines were hemostatic.   The mesh was in excellent position.  Liposomal bupivacaine was then injected around the suture line in the preperitoneal position under direct vision.  Ports were removed, pneumoperitoneum was relieved, port sites were closed with absorbable suture and sterile dressings were applied.  The patient tolerated the procedure well.    Brief Discharge Note:    Outcome: Satisfactory  Disposition: Discharged home postoperatively in good condition  Followup: 2wks  Final Diagnosis same as postoperative diagnosis      Jose Delacruz MD  Surgical Oncology  [facility], Gastrointestinal and Hepatobiliary Surgery

## 2024-12-03 ENCOUNTER — OFFICE VISIT (OUTPATIENT)
Dept: SURGICAL ONCOLOGY | Facility: CLINIC | Age: 59
End: 2024-12-03
Payer: OTHER GOVERNMENT

## 2024-12-03 VITALS
DIASTOLIC BLOOD PRESSURE: 76 MMHG | HEART RATE: 97 BPM | HEIGHT: 66 IN | BODY MASS INDEX: 39.34 KG/M2 | WEIGHT: 244.81 LBS | OXYGEN SATURATION: 97 % | SYSTOLIC BLOOD PRESSURE: 114 MMHG

## 2024-12-03 DIAGNOSIS — K43.2 INCISIONAL HERNIA, WITHOUT OBSTRUCTION OR GANGRENE: Primary | ICD-10-CM

## 2024-12-03 PROCEDURE — 99999 PR PBB SHADOW E&M-EST. PATIENT-LVL V: CPT | Mod: PBBFAC,,, | Performed by: SURGERY

## 2024-12-03 PROCEDURE — 99215 OFFICE O/P EST HI 40 MIN: CPT | Mod: PBBFAC | Performed by: SURGERY

## 2024-12-03 PROCEDURE — 99212 OFFICE O/P EST SF 10 MIN: CPT | Mod: S$PBB,,, | Performed by: SURGERY

## 2024-12-03 NOTE — PROGRESS NOTES
Patient returns for postop visit after incisional hernia repair, no complaints.    On exam he appears well in no apparent distress  Laparoscopic/robotic port sites were healing well without complication.  Hernia repair is intact.    Doing well postoperatively, return to clinic as needed

## 2024-12-15 ENCOUNTER — HOSPITAL ENCOUNTER (INPATIENT)
Facility: HOSPITAL | Age: 59
LOS: 3 days | Discharge: HOME | DRG: 682 | End: 2024-12-18
Attending: STUDENT IN AN ORGANIZED HEALTH CARE EDUCATION/TRAINING PROGRAM | Admitting: INTERNAL MEDICINE
Payer: OTHER GOVERNMENT

## 2024-12-15 DIAGNOSIS — R41.82 AMS (ALTERED MENTAL STATUS): ICD-10-CM

## 2024-12-15 DIAGNOSIS — R00.0 TACHYCARDIA: ICD-10-CM

## 2024-12-15 DIAGNOSIS — E87.5 HYPERKALEMIA: ICD-10-CM

## 2024-12-15 DIAGNOSIS — N17.9 ACUTE RENAL FAILURE, UNSPECIFIED ACUTE RENAL FAILURE TYPE: Primary | ICD-10-CM

## 2024-12-15 DIAGNOSIS — E87.20 METABOLIC ACIDOSIS: ICD-10-CM

## 2024-12-15 DIAGNOSIS — E87.70 VOLUME OVERLOAD: ICD-10-CM

## 2024-12-15 DIAGNOSIS — R07.9 CHEST PAIN: ICD-10-CM

## 2024-12-15 DIAGNOSIS — K43.2 INCISIONAL HERNIA, WITHOUT OBSTRUCTION OR GANGRENE: ICD-10-CM

## 2024-12-15 LAB
ABS NEUT (OLG): 9.13 X10(3)/MCL (ref 2.1–9.2)
ALBUMIN SERPL-MCNC: 2.3 G/DL (ref 3.5–5)
ALBUMIN/GLOB SERPL: 0.6 RATIO (ref 1.1–2)
ALP SERPL-CCNC: 88 UNIT/L (ref 40–150)
ALT SERPL-CCNC: 34 UNIT/L (ref 0–55)
AMPHET UR QL SCN: NEGATIVE
ANION GAP SERPL CALC-SCNC: 12 MEQ/L
ANION GAP SERPL CALC-SCNC: 13 MEQ/L
ANION GAP SERPL CALC-SCNC: 14 MEQ/L
ANISOCYTOSIS BLD QL SMEAR: ABNORMAL
AST SERPL-CCNC: 51 UNIT/L (ref 5–34)
BACTERIA #/AREA URNS AUTO: ABNORMAL /HPF
BARBITURATE SCN PRESENT UR: NEGATIVE
BASE EXCESS BLD CALC-SCNC: -4.1 MMOL/L (ref -2–2)
BASOPHILS NFR BLD MANUAL: 0.12 X10(3)/MCL (ref 0–0.2)
BASOPHILS NFR BLD MANUAL: 1 %
BENZODIAZ UR QL SCN: NEGATIVE
BILIRUB SERPL-MCNC: 0.5 MG/DL
BILIRUB UR QL STRIP.AUTO: NEGATIVE
BLOOD GAS SAMPLE TYPE (OHS): ABNORMAL
BNP BLD-MCNC: 456.9 PG/ML
BUN SERPL-MCNC: 82.8 MG/DL (ref 8.4–25.7)
BUN SERPL-MCNC: 84.5 MG/DL (ref 8.4–25.7)
BUN SERPL-MCNC: 87.4 MG/DL (ref 8.4–25.7)
BURR CELLS (OLG): ABNORMAL
CA-I BLD-SCNC: 0.89 MMOL/L (ref 1.12–1.23)
CALCIUM SERPL-MCNC: 7.1 MG/DL (ref 8.4–10.2)
CALCIUM SERPL-MCNC: 7.1 MG/DL (ref 8.4–10.2)
CALCIUM SERPL-MCNC: 7.4 MG/DL (ref 8.4–10.2)
CANNABINOIDS UR QL SCN: NEGATIVE
CHLORIDE SERPL-SCNC: 87 MMOL/L (ref 98–107)
CHLORIDE SERPL-SCNC: 90 MMOL/L (ref 98–107)
CHLORIDE SERPL-SCNC: 92 MMOL/L (ref 98–107)
CK SERPL-CCNC: 891 U/L (ref 30–200)
CLARITY UR: CLEAR
CO2 BLDA-SCNC: 22.9 MMOL/L
CO2 SERPL-SCNC: 20 MMOL/L (ref 22–29)
CO2 SERPL-SCNC: 21 MMOL/L (ref 22–29)
CO2 SERPL-SCNC: 21 MMOL/L (ref 22–29)
COCAINE UR QL SCN: NEGATIVE
COHGB MFR BLDA: 2.1 % (ref 0.5–1.5)
COLOR UR AUTO: YELLOW
CREAT SERPL-MCNC: 3.95 MG/DL (ref 0.72–1.25)
CREAT SERPL-MCNC: 4.5 MG/DL (ref 0.72–1.25)
CREAT SERPL-MCNC: 4.89 MG/DL (ref 0.72–1.25)
CREAT UR-MCNC: 32.8 MG/DL (ref 63–166)
CREAT/UREA NIT SERPL: 18
CREAT/UREA NIT SERPL: 19
CREAT/UREA NIT SERPL: 21
DRAWN BY BLOOD GAS (OHS): ABNORMAL
ERYTHROCYTE [DISTWIDTH] IN BLOOD BY AUTOMATED COUNT: 15.2 % (ref 11.5–17)
FENTANYL UR QL SCN: NEGATIVE
GFR SERPLBLD CREATININE-BSD FMLA CKD-EPI: 13 ML/MIN/1.73/M2
GFR SERPLBLD CREATININE-BSD FMLA CKD-EPI: 14 ML/MIN/1.73/M2
GFR SERPLBLD CREATININE-BSD FMLA CKD-EPI: 17 ML/MIN/1.73/M2
GLOBULIN SER-MCNC: 3.8 GM/DL (ref 2.4–3.5)
GLUCOSE SERPL-MCNC: 65 MG/DL (ref 74–100)
GLUCOSE SERPL-MCNC: 76 MG/DL (ref 74–100)
GLUCOSE SERPL-MCNC: 94 MG/DL (ref 74–100)
GLUCOSE UR QL STRIP: NORMAL
HCO3 BLDA-SCNC: 21.6 MMOL/L (ref 22–26)
HCT VFR BLD AUTO: 26.5 % (ref 42–52)
HGB BLD-MCNC: 9.1 G/DL (ref 14–18)
HGB UR QL STRIP: ABNORMAL
INR PPP: 1.3
INSTRUMENT WBC (OLG): 11.7 X10(3)/MCL
KETONES UR QL STRIP: NEGATIVE
LACTATE SERPL-SCNC: 0.5 MMOL/L (ref 0.5–2.2)
LACTATE SERPL-SCNC: 0.6 MMOL/L (ref 0.5–2.2)
LEUKOCYTE ESTERASE UR QL STRIP: NEGATIVE
LYMPHOCYTES NFR BLD MANUAL: 0.7 X10(3)/MCL
LYMPHOCYTES NFR BLD MANUAL: 6 %
MAGNESIUM SERPL-MCNC: 2.2 MG/DL (ref 1.6–2.6)
MCH RBC QN AUTO: 30.3 PG (ref 27–31)
MCHC RBC AUTO-ENTMCNC: 34.3 G/DL (ref 33–36)
MCV RBC AUTO: 88.3 FL (ref 80–94)
MDMA UR QL SCN: NEGATIVE
METAMYELOCYTES NFR BLD MANUAL: 4 %
METHGB MFR BLDA: 0.6 % (ref 0.4–1.5)
MONOCYTES NFR BLD MANUAL: 1.17 X10(3)/MCL (ref 0.1–1.3)
MONOCYTES NFR BLD MANUAL: 10 %
MUCOUS THREADS URNS QL MICRO: ABNORMAL /LPF
MYELOCYTES NFR BLD MANUAL: 2 %
NEUTROPHILS NFR BLD MANUAL: 78 %
NITRITE UR QL STRIP: NEGATIVE
NRBC BLD AUTO-RTO: 0 %
O2 HB BLOOD GAS (OHS): 93 % (ref 94–97)
OHS QRS DURATION: 106 MS
OHS QTC CALCULATION: 442 MS
OPIATES UR QL SCN: POSITIVE
OSMOLALITY UR: 196 MOSM/KG (ref 300–1300)
OXYGEN DEVICE BLOOD GAS (OHS): ABNORMAL
OXYHGB MFR BLDA: 9.2 G/DL (ref 12–16)
PCO2 BLDA: 41 MMHG (ref 35–45)
PCP UR QL: NEGATIVE
PH BLDA: 7.33 [PH] (ref 7.35–7.45)
PH UR STRIP: 5 [PH]
PH UR: 5 [PH] (ref 3–11)
PLATELET # BLD AUTO: 173 X10(3)/MCL (ref 130–400)
PLATELET # BLD EST: NORMAL 10*3/UL
PMV BLD AUTO: 8.7 FL (ref 7.4–10.4)
PO2 BLDA: 71 MMHG (ref 80–100)
POIKILOCYTOSIS BLD QL SMEAR: ABNORMAL
POLYCHROMASIA BLD QL SMEAR: SLIGHT
POTASSIUM BLOOD GAS (OHS): 6 MMOL/L (ref 3.5–5)
POTASSIUM SERPL-SCNC: 4.6 MMOL/L (ref 3.5–5.1)
POTASSIUM SERPL-SCNC: 5.1 MMOL/L (ref 3.5–5.1)
POTASSIUM SERPL-SCNC: 6.1 MMOL/L (ref 3.5–5.1)
PROT SERPL-MCNC: 6.1 GM/DL (ref 6.4–8.3)
PROT UR QL STRIP: NEGATIVE
PROT UR STRIP-MCNC: 9.8 MG/DL
PROTHROMBIN TIME: 15.7 SECONDS (ref 12.5–14.5)
RBC # BLD AUTO: 3 X10(6)/MCL (ref 4.7–6.1)
RBC #/AREA URNS AUTO: ABNORMAL /HPF
RBC MORPH BLD: ABNORMAL
SAMPLE SITE BLOOD GAS (OHS): ABNORMAL
SAO2 % BLDA: 92.7 %
SODIUM BLOOD GAS (OHS): 115 MMOL/L (ref 137–145)
SODIUM SERPL-SCNC: 120 MMOL/L (ref 136–145)
SODIUM SERPL-SCNC: 123 MMOL/L (ref 136–145)
SODIUM SERPL-SCNC: 127 MMOL/L (ref 136–145)
SODIUM UR-SCNC: 37 MMOL/L
SP GR UR STRIP.AUTO: 1.01 (ref 1–1.03)
SPECIFIC GRAVITY, URINE AUTO (.000) (OHS): 1.01 (ref 1–1.03)
SQUAMOUS #/AREA URNS LPF: ABNORMAL /HPF
TROPONIN I SERPL-MCNC: 0.03 NG/ML (ref 0–0.04)
TSH SERPL-ACNC: 0.62 UIU/ML (ref 0.35–4.94)
UROBILINOGEN UR STRIP-ACNC: NORMAL
WBC # BLD AUTO: 11.7 X10(3)/MCL (ref 4.5–11.5)
WBC #/AREA URNS AUTO: ABNORMAL /HPF

## 2024-12-15 PROCEDURE — 36415 COLL VENOUS BLD VENIPUNCTURE: CPT | Performed by: INTERNAL MEDICINE

## 2024-12-15 PROCEDURE — 99900031 HC PATIENT EDUCATION (STAT)

## 2024-12-15 PROCEDURE — 93005 ELECTROCARDIOGRAM TRACING: CPT

## 2024-12-15 PROCEDURE — 93010 ELECTROCARDIOGRAM REPORT: CPT | Mod: ,,, | Performed by: INTERNAL MEDICINE

## 2024-12-15 PROCEDURE — 84443 ASSAY THYROID STIM HORMONE: CPT | Performed by: PHYSICIAN ASSISTANT

## 2024-12-15 PROCEDURE — 25000242 PHARM REV CODE 250 ALT 637 W/ HCPCS: Performed by: STUDENT IN AN ORGANIZED HEALTH CARE EDUCATION/TRAINING PROGRAM

## 2024-12-15 PROCEDURE — 83935 ASSAY OF URINE OSMOLALITY: CPT | Performed by: PHYSICIAN ASSISTANT

## 2024-12-15 PROCEDURE — 82962 GLUCOSE BLOOD TEST: CPT

## 2024-12-15 PROCEDURE — 96367 TX/PROPH/DG ADDL SEQ IV INF: CPT

## 2024-12-15 PROCEDURE — 36415 COLL VENOUS BLD VENIPUNCTURE: CPT | Performed by: NURSE PRACTITIONER

## 2024-12-15 PROCEDURE — 99900035 HC TECH TIME PER 15 MIN (STAT)

## 2024-12-15 PROCEDURE — 21400001 HC TELEMETRY ROOM

## 2024-12-15 PROCEDURE — 25000003 PHARM REV CODE 250: Performed by: STUDENT IN AN ORGANIZED HEALTH CARE EDUCATION/TRAINING PROGRAM

## 2024-12-15 PROCEDURE — 80307 DRUG TEST PRSMV CHEM ANLYZR: CPT | Performed by: STUDENT IN AN ORGANIZED HEALTH CARE EDUCATION/TRAINING PROGRAM

## 2024-12-15 PROCEDURE — 94644 CONT INHLJ TX 1ST HOUR: CPT

## 2024-12-15 PROCEDURE — 80053 COMPREHEN METABOLIC PANEL: CPT | Performed by: STUDENT IN AN ORGANIZED HEALTH CARE EDUCATION/TRAINING PROGRAM

## 2024-12-15 PROCEDURE — 25000003 PHARM REV CODE 250: Performed by: NURSE PRACTITIONER

## 2024-12-15 PROCEDURE — 96361 HYDRATE IV INFUSION ADD-ON: CPT

## 2024-12-15 PROCEDURE — 51702 INSERT TEMP BLADDER CATH: CPT

## 2024-12-15 PROCEDURE — 85027 COMPLETE CBC AUTOMATED: CPT | Performed by: STUDENT IN AN ORGANIZED HEALTH CARE EDUCATION/TRAINING PROGRAM

## 2024-12-15 PROCEDURE — 84484 ASSAY OF TROPONIN QUANT: CPT | Performed by: STUDENT IN AN ORGANIZED HEALTH CARE EDUCATION/TRAINING PROGRAM

## 2024-12-15 PROCEDURE — 83605 ASSAY OF LACTIC ACID: CPT | Performed by: STUDENT IN AN ORGANIZED HEALTH CARE EDUCATION/TRAINING PROGRAM

## 2024-12-15 PROCEDURE — 82803 BLOOD GASES ANY COMBINATION: CPT

## 2024-12-15 PROCEDURE — 84300 ASSAY OF URINE SODIUM: CPT | Performed by: PHYSICIAN ASSISTANT

## 2024-12-15 PROCEDURE — 83880 ASSAY OF NATRIURETIC PEPTIDE: CPT | Performed by: STUDENT IN AN ORGANIZED HEALTH CARE EDUCATION/TRAINING PROGRAM

## 2024-12-15 PROCEDURE — 82550 ASSAY OF CK (CPK): CPT | Performed by: STUDENT IN AN ORGANIZED HEALTH CARE EDUCATION/TRAINING PROGRAM

## 2024-12-15 PROCEDURE — 93010 ELECTROCARDIOGRAM REPORT: CPT | Mod: ,,, | Performed by: STUDENT IN AN ORGANIZED HEALTH CARE EDUCATION/TRAINING PROGRAM

## 2024-12-15 PROCEDURE — 11000001 HC ACUTE MED/SURG PRIVATE ROOM

## 2024-12-15 PROCEDURE — 82570 ASSAY OF URINE CREATININE: CPT | Performed by: PHYSICIAN ASSISTANT

## 2024-12-15 PROCEDURE — 87040 BLOOD CULTURE FOR BACTERIA: CPT | Performed by: STUDENT IN AN ORGANIZED HEALTH CARE EDUCATION/TRAINING PROGRAM

## 2024-12-15 PROCEDURE — 94799 UNLISTED PULMONARY SVC/PX: CPT

## 2024-12-15 PROCEDURE — 63600175 PHARM REV CODE 636 W HCPCS: Performed by: NURSE PRACTITIONER

## 2024-12-15 PROCEDURE — 83605 ASSAY OF LACTIC ACID: CPT | Performed by: INTERNAL MEDICINE

## 2024-12-15 PROCEDURE — 84156 ASSAY OF PROTEIN URINE: CPT | Performed by: PHYSICIAN ASSISTANT

## 2024-12-15 PROCEDURE — 36600 WITHDRAWAL OF ARTERIAL BLOOD: CPT

## 2024-12-15 PROCEDURE — 81001 URINALYSIS AUTO W/SCOPE: CPT | Mod: XB | Performed by: STUDENT IN AN ORGANIZED HEALTH CARE EDUCATION/TRAINING PROGRAM

## 2024-12-15 PROCEDURE — 99285 EMERGENCY DEPT VISIT HI MDM: CPT | Mod: 25

## 2024-12-15 PROCEDURE — 25000003 PHARM REV CODE 250: Performed by: INTERNAL MEDICINE

## 2024-12-15 PROCEDURE — 83735 ASSAY OF MAGNESIUM: CPT | Performed by: STUDENT IN AN ORGANIZED HEALTH CARE EDUCATION/TRAINING PROGRAM

## 2024-12-15 PROCEDURE — 25000003 PHARM REV CODE 250: Performed by: PHYSICIAN ASSISTANT

## 2024-12-15 PROCEDURE — 96365 THER/PROPH/DIAG IV INF INIT: CPT

## 2024-12-15 PROCEDURE — 85610 PROTHROMBIN TIME: CPT | Performed by: STUDENT IN AN ORGANIZED HEALTH CARE EDUCATION/TRAINING PROGRAM

## 2024-12-15 RX ORDER — METHOCARBAMOL 750 MG/1
750 TABLET, FILM COATED ORAL 2 TIMES DAILY PRN
Status: DISCONTINUED | OUTPATIENT
Start: 2024-12-15 | End: 2024-12-18 | Stop reason: HOSPADM

## 2024-12-15 RX ORDER — CALCIUM GLUCONATE 20 MG/ML
1 INJECTION, SOLUTION INTRAVENOUS
Status: COMPLETED | OUTPATIENT
Start: 2024-12-15 | End: 2024-12-15

## 2024-12-15 RX ORDER — NALOXONE HCL 0.4 MG/ML
0.02 VIAL (ML) INJECTION
Status: DISCONTINUED | OUTPATIENT
Start: 2024-12-15 | End: 2024-12-18 | Stop reason: HOSPADM

## 2024-12-15 RX ORDER — DIVALPROEX SODIUM 250 MG/1
500 TABLET, FILM COATED, EXTENDED RELEASE ORAL NIGHTLY
Status: DISCONTINUED | OUTPATIENT
Start: 2024-12-15 | End: 2024-12-18 | Stop reason: HOSPADM

## 2024-12-15 RX ORDER — GLUCAGON 1 MG
1 KIT INJECTION
Status: DISCONTINUED | OUTPATIENT
Start: 2024-12-15 | End: 2024-12-18 | Stop reason: HOSPADM

## 2024-12-15 RX ORDER — FLUVOXAMINE MALEATE 50 MG/1
100 TABLET, FILM COATED ORAL 2 TIMES DAILY
Status: DISCONTINUED | OUTPATIENT
Start: 2024-12-15 | End: 2024-12-18 | Stop reason: HOSPADM

## 2024-12-15 RX ORDER — OLANZAPINE 5 MG/1
20 TABLET ORAL NIGHTLY
Status: DISCONTINUED | OUTPATIENT
Start: 2024-12-15 | End: 2024-12-18 | Stop reason: HOSPADM

## 2024-12-15 RX ORDER — IBUPROFEN 200 MG
16 TABLET ORAL
Status: DISCONTINUED | OUTPATIENT
Start: 2024-12-15 | End: 2024-12-18 | Stop reason: HOSPADM

## 2024-12-15 RX ORDER — PROCHLORPERAZINE EDISYLATE 5 MG/ML
5 INJECTION INTRAMUSCULAR; INTRAVENOUS EVERY 6 HOURS PRN
Status: DISCONTINUED | OUTPATIENT
Start: 2024-12-15 | End: 2024-12-18 | Stop reason: HOSPADM

## 2024-12-15 RX ORDER — HEPARIN SODIUM 5000 [USP'U]/ML
5000 INJECTION, SOLUTION INTRAVENOUS; SUBCUTANEOUS EVERY 12 HOURS
Status: DISCONTINUED | OUTPATIENT
Start: 2024-12-15 | End: 2024-12-18 | Stop reason: HOSPADM

## 2024-12-15 RX ORDER — MUPIROCIN 20 MG/G
OINTMENT TOPICAL 2 TIMES DAILY
Status: DISCONTINUED | OUTPATIENT
Start: 2024-12-15 | End: 2024-12-18 | Stop reason: HOSPADM

## 2024-12-15 RX ORDER — SODIUM CHLORIDE 9 MG/ML
500 INJECTION, SOLUTION INTRAVENOUS
Status: COMPLETED | OUTPATIENT
Start: 2024-12-15 | End: 2024-12-15

## 2024-12-15 RX ORDER — ACETAMINOPHEN 500 MG
1000 TABLET ORAL EVERY 6 HOURS PRN
Status: DISCONTINUED | OUTPATIENT
Start: 2024-12-15 | End: 2024-12-18 | Stop reason: HOSPADM

## 2024-12-15 RX ORDER — ONDANSETRON HYDROCHLORIDE 2 MG/ML
4 INJECTION, SOLUTION INTRAVENOUS EVERY 4 HOURS PRN
Status: DISCONTINUED | OUTPATIENT
Start: 2024-12-15 | End: 2024-12-18 | Stop reason: HOSPADM

## 2024-12-15 RX ORDER — TIMOLOL MALEATE 5 MG/ML
1 SOLUTION/ DROPS OPHTHALMIC 2 TIMES DAILY
Status: DISCONTINUED | OUTPATIENT
Start: 2024-12-15 | End: 2024-12-18 | Stop reason: HOSPADM

## 2024-12-15 RX ORDER — SODIUM CHLORIDE 9 MG/ML
INJECTION, SOLUTION INTRAVENOUS CONTINUOUS
Status: DISCONTINUED | OUTPATIENT
Start: 2024-12-15 | End: 2024-12-18 | Stop reason: HOSPADM

## 2024-12-15 RX ORDER — AMITRIPTYLINE HYDROCHLORIDE 50 MG/1
50 TABLET, FILM COATED ORAL NIGHTLY
Status: DISCONTINUED | OUTPATIENT
Start: 2024-12-15 | End: 2024-12-18 | Stop reason: HOSPADM

## 2024-12-15 RX ORDER — CALCIUM CARBONATE 200(500)MG
500 TABLET,CHEWABLE ORAL DAILY
Status: DISCONTINUED | OUTPATIENT
Start: 2024-12-16 | End: 2024-12-18 | Stop reason: HOSPADM

## 2024-12-15 RX ORDER — ACETAMINOPHEN 325 MG/1
650 TABLET ORAL EVERY 4 HOURS PRN
Status: DISCONTINUED | OUTPATIENT
Start: 2024-12-15 | End: 2024-12-18 | Stop reason: HOSPADM

## 2024-12-15 RX ORDER — LANOLIN ALCOHOL/MO/W.PET/CERES
1 CREAM (GRAM) TOPICAL DAILY
Status: DISCONTINUED | OUTPATIENT
Start: 2024-12-16 | End: 2024-12-18 | Stop reason: HOSPADM

## 2024-12-15 RX ORDER — PRAMIPEXOLE DIHYDROCHLORIDE 0.25 MG/1
1 TABLET ORAL NIGHTLY
Status: DISCONTINUED | OUTPATIENT
Start: 2024-12-15 | End: 2024-12-18 | Stop reason: HOSPADM

## 2024-12-15 RX ORDER — QUETIAPINE FUMARATE 25 MG/1
25 TABLET, FILM COATED ORAL NIGHTLY
Status: DISCONTINUED | OUTPATIENT
Start: 2024-12-15 | End: 2024-12-18 | Stop reason: HOSPADM

## 2024-12-15 RX ORDER — IBUPROFEN 200 MG
24 TABLET ORAL
Status: DISCONTINUED | OUTPATIENT
Start: 2024-12-15 | End: 2024-12-18 | Stop reason: HOSPADM

## 2024-12-15 RX ORDER — LORAZEPAM 0.5 MG/1
0.5 TABLET ORAL EVERY 8 HOURS PRN
Status: DISCONTINUED | OUTPATIENT
Start: 2024-12-15 | End: 2024-12-18 | Stop reason: HOSPADM

## 2024-12-15 RX ORDER — CALCIUM GLUCONATE 20 MG/ML
1 INJECTION, SOLUTION INTRAVENOUS EVERY 10 MIN PRN
Status: DISCONTINUED | OUTPATIENT
Start: 2024-12-15 | End: 2024-12-18 | Stop reason: HOSPADM

## 2024-12-15 RX ORDER — ALBUTEROL SULFATE 0.83 MG/ML
10 SOLUTION RESPIRATORY (INHALATION)
Status: COMPLETED | OUTPATIENT
Start: 2024-12-15 | End: 2024-12-15

## 2024-12-15 RX ORDER — DEXTROSE MONOHYDRATE 100 MG/ML
INJECTION, SOLUTION INTRAVENOUS
Status: COMPLETED | OUTPATIENT
Start: 2024-12-15 | End: 2024-12-15

## 2024-12-15 RX ORDER — CITALOPRAM 20 MG/1
40 TABLET, FILM COATED ORAL EVERY MORNING
Status: DISCONTINUED | OUTPATIENT
Start: 2024-12-16 | End: 2024-12-18 | Stop reason: HOSPADM

## 2024-12-15 RX ORDER — SODIUM CHLORIDE 0.9 % (FLUSH) 0.9 %
10 SYRINGE (ML) INJECTION
Status: DISCONTINUED | OUTPATIENT
Start: 2024-12-15 | End: 2024-12-18 | Stop reason: HOSPADM

## 2024-12-15 RX ADMIN — MUPIROCIN: 20 OINTMENT TOPICAL at 09:12

## 2024-12-15 RX ADMIN — DEXTROSE MONOHYDRATE: 100 INJECTION, SOLUTION INTRAVENOUS at 01:12

## 2024-12-15 RX ADMIN — FLUVOXAMINE MALEATE 100 MG: 50 TABLET ORAL at 09:12

## 2024-12-15 RX ADMIN — OLANZAPINE 20 MG: 5 TABLET, FILM COATED ORAL at 09:12

## 2024-12-15 RX ADMIN — HEPARIN SODIUM 5000 UNITS: 5000 INJECTION, SOLUTION INTRAVENOUS; SUBCUTANEOUS at 09:12

## 2024-12-15 RX ADMIN — DIVALPROEX SODIUM 500 MG: 250 TABLET, EXTENDED RELEASE ORAL at 09:12

## 2024-12-15 RX ADMIN — TIMOLOL MALEATE 1 DROP: 5 SOLUTION OPHTHALMIC at 09:12

## 2024-12-15 RX ADMIN — SODIUM CHLORIDE 500 ML: 9 INJECTION, SOLUTION INTRAVENOUS at 10:12

## 2024-12-15 RX ADMIN — SODIUM CHLORIDE 500 ML: 9 INJECTION, SOLUTION INTRAVENOUS at 11:12

## 2024-12-15 RX ADMIN — SODIUM CHLORIDE: 9 INJECTION, SOLUTION INTRAVENOUS at 03:12

## 2024-12-15 RX ADMIN — SODIUM CHLORIDE 500 ML: 9 INJECTION, SOLUTION INTRAVENOUS at 02:12

## 2024-12-15 RX ADMIN — CALCIUM GLUCONATE 1 G: 20 INJECTION, SOLUTION INTRAVENOUS at 12:12

## 2024-12-15 RX ADMIN — QUETIAPINE FUMARATE 25 MG: 25 TABLET ORAL at 09:12

## 2024-12-15 RX ADMIN — PRAMIPEXOLE DIHYDROCHLORIDE 1 MG: 0.25 TABLET ORAL at 09:12

## 2024-12-15 RX ADMIN — ALBUTEROL SULFATE 10 MG: 2.5 SOLUTION RESPIRATORY (INHALATION) at 01:12

## 2024-12-15 RX ADMIN — AMITRIPTYLINE HYDROCHLORIDE 50 MG: 50 TABLET, FILM COATED ORAL at 09:12

## 2024-12-15 NOTE — ED PROVIDER NOTES
Encounter Date: 12/15/2024    SCRIBE #1 NOTE: I, Ramesh Pantoja, am scribing for, and in the presence of,  Dr. Soria. I have scribed the following portions of the note - Other sections scribed: HPI, ROS, Physical Exam, MDM, Attending.       History     Chief Complaint   Patient presents with    Altered Mental Status     AMS x 2 days with pitting edema to bilateral lower legs. Gcs 14 cbg-85     60 y/o male with history of HTN, HLD, chronic pancreatitis and schizophrenia presents to ED via EMS for AMS onset 2 days ago.  Pt complains of R-sided abdominal pain and leg swelling.  CBG was 85.  Pt was hypertensive en route but became hypotensive after arrival per RN.  He had laparoscopic surgery to repair incisional hernia with Dr. Delacruz on 11/21.  He denies vomiting.      The history is provided by the patient and medical records.     Review of patient's allergies indicates:  No Known Allergies  Past Medical History:   Diagnosis Date    Abdominal pain     Hernia pain    Anemia     Anxiety     Arthritis of knee, right     Bowel obstruction     Carpal tunnel syndrome on both sides     Chronic pancreatitis     Constipation     Depression     Fatigue     HLD (hyperlipidemia)     Hypertension     Incisional hernia without obstruction or gangrene     Insomnia     Low back pain     Lumbar degenerative disc disease     Neuropathy     Obesity     Osteoarthritis     PTSD (post-traumatic stress disorder)     Restless leg     Right hip pain     Right knee pain     Schizophrenia     Sleep apnea     CPAP    Use of cane as ambulatory aid     Uses walker     Vertigo     Weakness      Past Surgical History:   Procedure Laterality Date    abdominal wall hematoma drainage  2010    p bowel obstruction surgery    COLONOSCOPY      ESOPHAGOGASTRODUODENOSCOPY      EXPLORATORY LAPAROTOMY  2010    bowel obstruction    FINGER SURGERY Left     pinky repair after work injury    HIP REPLACEMENT ARTHROPLASTY Right      LAPAROSCOPIC NISSEN FUNDOPLICATION      ROBOT-ASSISTED LAPAROSCOPIC REPAIR OF INCISIONAL HERNIA N/A 11/21/2024    Procedure: ROBOTIC REPAIR, HERNIA, INCISIONAL;  Surgeon: Jose Delacruz MD;  Location: [facility] OR;  Service: Oncology;  Laterality: N/A;  INCISIONAL HERNIA WITH MESH    ROTATOR CUFF REPAIR Left     VARICOSE VEIN SURGERY Left     leg     No family history on file.  Social History     Tobacco Use    Smoking status: Never    Smokeless tobacco: Never   Substance Use Topics    Alcohol use: Not Currently    Drug use: Never     Review of Systems   Cardiovascular:  Positive for leg swelling.   Gastrointestinal:  Positive for abdominal pain. Negative for vomiting.   Psychiatric/Behavioral:  Positive for confusion.        Physical Exam     Initial Vitals [12/15/24 1015]   BP Pulse Resp Temp SpO2   (!) 168/103 100 (!) 22 98.1 °F (36.7 °C) 100 %      MAP       --         Physical Exam    Nursing note and vitals reviewed.  Constitutional: He appears well-developed.   HENT:   Dry mucous membranes    Eyes: EOM are normal. Pupils are equal, round, and reactive to light.   Cardiovascular:  Normal rate and regular rhythm.           No murmur heard.  Palpable pulses in all extremities    Pulmonary/Chest: No respiratory distress. He has wheezes (scattered).   Crackles bilaterally, mostly in bases    Abdominal: Abdomen is soft. He exhibits no distension. There is no abdominal tenderness.   Ventral hernia; multiple well-healing abdominal surgical incisions    Musculoskeletal:         General: Edema (3+ pitting bilateral LE) present.     Neurological: He is alert. GCS eye subscore is 4. GCS verbal subscore is 4. GCS motor subscore is 6.   Drowsy; somnolent; appears confused   Skin: Skin is warm. Capillary refill takes less than 2 seconds. No rash noted.         ED Course   Critical Care    Date/Time: 12/15/2024 4:11 PM    Performed by: Minor Soria MD  Authorized by: Minor Soria MD  Direct patient  critical care time: 75 minutes  Total critical care time (exclusive of procedural time) : 75 minutes  Critical care time was exclusive of separately billable procedures and treating other patients.  Critical care was necessary to treat or prevent imminent or life-threatening deterioration of the following conditions: renal failure.  Critical care was time spent personally by me on the following activities: development of treatment plan with patient or surrogate, discussions with consultants, evaluation of patient's response to treatment, examination of patient, obtaining history from patient or surrogate, ordering and performing treatments and interventions, ordering and review of laboratory studies, ordering and review of radiographic studies, pulse oximetry, re-evaluation of patient's condition and review of old charts.        Labs Reviewed   COMPREHENSIVE METABOLIC PANEL - Abnormal       Result Value    Sodium 120 (*)     Potassium 6.1 (*)     Chloride 87 (*)     CO2 20 (*)     Glucose 65 (*)     Blood Urea Nitrogen 87.4 (*)     Creatinine 4.89 (*)     Calcium 7.4 (*)     Protein Total 6.1 (*)     Albumin 2.3 (*)     Globulin 3.8 (*)     Albumin/Globulin Ratio 0.6 (*)     Bilirubin Total 0.5      ALP 88      ALT 34      AST 51 (*)     eGFR 13      Anion Gap 13.0      BUN/Creatinine Ratio 18     PROTIME-INR - Abnormal    PT 15.7 (*)     INR 1.3     URINALYSIS, REFLEX TO URINE CULTURE - Abnormal    Color, UA Yellow      Appearance, UA Clear      Specific Gravity, UA 1.008      pH, UA 5.0      Protein, UA Negative      Glucose, UA Normal      Ketones, UA Negative      Blood, UA Trace (*)     Bilirubin, UA Negative      Urobilinogen, UA Normal      Nitrites, UA Negative      Leukocyte Esterase, UA Negative      RBC, UA 0-5      WBC, UA 0-5      Bacteria, UA Trace      Squamous Epithelial Cells, UA Trace      Mucous, UA Trace (*)    B-TYPE NATRIURETIC PEPTIDE - Abnormal    Natriuretic Peptide 456.9 (*)    CBC WITH  DIFFERENTIAL - Abnormal    WBC 11.70 (*)     RBC 3.00 (*)     Hgb 9.1 (*)     Hct 26.5 (*)     MCV 88.3      MCH 30.3      MCHC 34.3      RDW 15.2      Platelet 173      MPV 8.7      NRBC% 0.0     BLOOD GAS - Abnormal    Sample Type Arterial Blood      Sample site Right Radial Artery      Drawn by KT RRT      pH, Blood gas 7.330 (*)     pCO2, Blood gas 41.0      pO2, Blood gas 71.0 (*)     Sodium, Blood Gas 115 (*)     Potassium, Blood Gas 6.0 (*)     Calcium Level Ionized 0.89 (*)     TOC2, Blood gas 22.9      Base Excess, Blood gas -4.10 (*)     sO2, Blood gas 92.7      HCO3, Blood gas 21.6 (*)     THb, Blood gas 9.2 (*)     O2 Hb, Blood Gas 93.0 (*)     CO Hgb 2.1 (*)     Met Hgb 0.6      Oxygen Device, Blood gas RA     MANUAL DIFFERENTIAL - Abnormal    WBC 11.7      Neutrophils % 78      Lymphs % 6      Monocytes % 10      Basophils % 1      Metamyelocytes % 4 (*)     Myelocytes % 2 (*)     Neutrophils Abs 9.126      Lymphs Abs 0.702      Monocytes Abs 1.17      Basophils Abs 0.117      Platelets Normal      RBC Morph Abnormal (*)     Polychromasia Slight (*)     Poikilocytosis 1+ (*)     Anisocytosis 1+ (*)     [place] Cells 1+ (*)    CK - Abnormal    Creatine Kinase 891 (*)    TROPONIN I - Normal    Troponin-I 0.031     MAGNESIUM - Normal    Magnesium Level 2.20     LACTIC ACID, PLASMA - Normal    Lactic Acid Level 0.6     BLOOD CULTURE OLG   BLOOD CULTURE OLG   CBC W/ AUTO DIFFERENTIAL    Narrative:     The following orders were created for panel order CBC auto differential.  Procedure                               Abnormality         Status                     ---------                               -----------         ------                     CBC with Differential[[id number]]       Abnormal            Final result               Manual Differential[[id number]]         Abnormal            Final result                 Please view results for these tests on the individual orders.   SODIUM, URINE, RANDOM    Urine  Sodium 37.0     BASIC METABOLIC PANEL   DRUG SCREEN, URINE (BEAKER)   PROTEIN, QUANTITATIVE, URINE RANDOM   CREATININE, URINE, RANDOM   POCT GLUCOSE, HAND-HELD DEVICE   POCT GLUCOSE MONITORING CONTINUOUS        ECG Results              EKG 12-lead (Final result)        Collection Time Result Time QRS Duration OHS QTC Calculation    12/15/24 10:33:23 12/15/24 11:31:09 106 442                     Final result by Interface, [facility] (12/15/24 11:31:12)                   Narrative:    Test Reason : R41.82,    Vent. Rate :  95 BPM     Atrial Rate :  95 BPM     P-R Int : 194 ms          QRS Dur : 106 ms      QT Int : 352 ms       P-R-T Axes :  57  73   6 degrees    QTcB Int : 442 ms    Normal sinus rhythm  T wave abnormality, consider inferior ischemia  Abnormal ECG  No previous ECGs available  Confirmed by Vinod Escoto ([id number]) on 12/15/2024 11:31:07 AM    Referred By:            Confirmed By: Vinod Escoto                                  Imaging Results              US Retroperitoneal Complete (Final result)  Result time 12/15/24 15:24:16      Final result by Lg Mcgee MD (12/15/24 15:24:16)                   Impression:      No acute kidney abnormality.      Electronically signed by: Lg Mcgee  Date:    12/15/2024  Time:    15:24               Narrative:    EXAMINATION:  US RETROPERITONEAL COMPLETE    CLINICAL HISTORY:  sara;    TECHNIQUE:  Ultrasound of the kidneys and urinary bladder was performed including color flow and Doppler evaluation of the kidneys.    COMPARISON:  12/15/2024 CT.    FINDINGS:  Right kidney: The right kidney measures 12.7 cm. No cortical thinning. No loss of corticomedullary distinction. No mass. No renal stone. No hydronephrosis.    Left kidney: The left kidney measures 11.8 cm. No cortical thinning. No loss of corticomedullary distinction. No mass. No renal stone. No hydronephrosis.    The urinary bladder is decompressed around a Valdez catheter                                        CT Chest Abdomen Pelvis Without Contrast (XPD) (Final result)  Result time 12/15/24 12:23:26      Final result by Suni Mayes MD (12/15/24 12:23:26)                   Impression:      1. No definite acute abnormality of the abdomen or pelvis.  2. Distended gallbladder without evidence of calcified stone.  3. Distended urinary bladder.      Electronically signed by: Suni Mayes  Date:    12/15/2024  Time:    12:23               Narrative:    EXAMINATION:  CT CHEST ABDOMEN PELVIS WITHOUT CONTRAST(XPD)    CLINICAL HISTORY:  Sepsis;Acute renal failure, sob, AMS;    TECHNIQUE:  CT of the chest, abdomen and pelvis without intravenous contrast.    Coronal and sagittal reconstructions were obtained from the axial data set.    Automatic exposure control was utilized to limit radiation dose.    Radiation Dose:    Total DLP: 16 17 mGy*cm    COMPARISON:  None    FINDINGS:  LINES/TUBES: None.    AIRWAYS: Clear centrally.    LUNGS: Clear.    PLEURA: No pleural effusions. No pneumothoraces.    HEART AND MEDIASTINUM: The heart is mildly enlarged.  There is no pericardial effusion.  There are small calcified mediastinal and lymph nodes.    SOFT TISSUES: Normal.    THORACIC BONES: No acute bony pathology.    ABDOMEN/PELVIS:    HEPATOBILIARY: The liver is unremarkable.  The gallbladder is distended.    SPLEEN: Unremarkable    PANCREAS: No focal masses or ductal dilatation.    ADRENALS: No adrenal nodules.    KIDNEYS/URETERS: No hydronephrosis.    PELVIC ORGANS/BLADDER: Over distended urinary bladder.    PERITONEUM/RETROPERITONEUM: No free intraperitoneal air. No free fluid.    LYMPH NODES: No lymphadenopathy.    VESSELS: Mild scattered vascular calcifications.    GI TRACT: Moderate colonic stool volume.  No bowel obstruction.    ABDOMINOPELVIC BONES AND SOFT TISSUE: Small amount of fluid in the anterior abdominal wall..  No acute bony pathology.                                       CT Head Without Contrast (Final  result)  Result time 12/15/24 12:17:31      Final result by uSni Mayes MD (12/15/24 12:17:31)                   Impression:      1. No acute intracranial abnormality.  2. Chronic microvascular ischemic changes.      Electronically signed by: Suni Mayes  Date:    12/15/2024  Time:    12:17               Narrative:    EXAMINATION:  CT HEAD WITHOUT CONTRAST    CLINICAL HISTORY:  Mental status change, unknown cause;    TECHNIQUE:  Axial scans were obtained from skull base to the vertex.    Coronal and sagittal reconstructions obtained from the axial data.    Automatic exposure control was utilized to limit radiation dose.    Contrast: None    Radiation Dose:    Total DLP: 2446 mGy*cm    COMPARISON:  CT head dated 11/18/2022    FINDINGS:  Evaluation is limited by motion artifact.  There is no acute intracranial hemorrhage or edema. The gray-white matter differentiation is preserved.  Scattered hypodensities in the subcortical and periventricular white matter likely represent chronic microvascular ischemic changes.    There is no mass effect or midline shift. The ventricles and sulci are normal in size. The basal cisterns are patent. There is no abnormal extra-axial fluid collection.  Carotid and vertebral artery calcifications are noted.    The calvarium and skull base are intact. The visualized paranasal sinuses and the mastoid air cells are clear.                                       X-Ray Chest AP Portable (Final result)  Result time 12/15/24 11:28:08      Final result by Lg Mcgee MD (12/15/24 11:28:08)                   Impression:      Prominent bronchovascular markings which may be due to hypoaeration or mild edema      Electronically signed by: Lg Mcgee  Date:    12/15/2024  Time:    11:28               Narrative:    EXAMINATION:  XR CHEST AP PORTABLE    CLINICAL HISTORY:  Altered mental status, unspecified    TECHNIQUE:  Single frontal view of the chest was  performed.    COMPARISON:  10/06/2024.    FINDINGS:  Unchanged cardiac silhouette.  The lungs are hypoaerated.  Prominent bronchovascular markings.  No pleural effusion or pneumothorax.  No acute osseous abnormalities.                                       Medications   calcium gluconate 1 g in NS IVPB (premixed) (0 g Intravenous Stopped 12/15/24 1319)     And   calcium gluconate 1 g in NS IVPB (premixed) (has no administration in time range)   0.9% NaCl infusion ( Intravenous New Bag 12/15/24 1554)   0.9% NaCl infusion (0 mLs Intravenous Stopped 12/15/24 1243)   albuterol nebulizer solution 10 mg (10 mg Nebulization Given 12/15/24 1335)   dextrose 10 % infusion (0 mL/hr Intravenous Stopped 12/15/24 1430)   0.9% NaCl infusion (0 mLs Intravenous Stopped 12/15/24 1553)     Medical Decision Making          Scribe Attestation:   Scribe #1: I performed the above scribed service and the documentation accurately describes the services I performed. I attest to the accuracy of the note.    Attending Attestation:           Physician Attestation for Scribe:  Physician Attestation Statement for Scribe #1: I, reviewed documentation, as scribed by Ramesh Pantoja in my presence, and it is both accurate and complete.             ED Course as of 12/15/24 1629   Sun Dec 15, 2024   1100 EKG independently interpreted by me.  EKG: NSR @ 95, no STEMI, TWI inferior leads, Qtc 442 [MC]      ED Course User Index  [MC] Minor Soria MD          Differential diagnosis (includes but is not limited to):   ***    [facility] Narrative  ***    Update: ***    Dispo: ***    My independent radiology interpretation: ***  Point of care US (independently performed and interpreted): ***  Decision rules/clinical scoring: ***    Sepsis Perfusion Assessment: ***    Amount and/or Complexity of Data Reviewed  Independent historian: {[facility]ENDENT[facility]OR[facility]:42307}   Summary of history: ***  External data reviewed: {[facility] Hospital[facility]NA[facility]AT[facility]EVIEWED:26952}  Summary of  data reviewed: ***  Risk and benefits of testing: discussed   Labs: ordered and reviewed  Radiology: ordered and independent interpretation performed (see above or ED course)  {MDMEC}  Discussion of management or test interpretation with external provider(s): {MDMEXTERNALPROVIDER:57770}   Summary of discussion: ***    Risk  {MDMRISK:92241}    Critical Care  {MDMCRITICALCARE:74321}    Data Reviewed/Counseling: I have personally reviewed the patient's vital signs, nursing notes, and other relevant tests, information, and imaging. I had a detailed discussion regarding the historical points, exam findings, and any diagnostic results supporting the discharge diagnosis. I personally performed the history, PE, MDM and procedures as documented above and agree with the scribe's documentation.    Portions of this note were dictated using voice recognition software. Although it was reviewed for accuracy, some inherent voice recognition errors may have occurred and may be present in this document.                   Clinical Impression:  Final diagnoses:  [R41.82] AMS (altered mental status)  [E87.70] Volume overload               leads, Qtc 442 []   1100 X-Ray Chest AP Portable  Independently visualized/reviewed by me during the ED visit.  - Cardiomegaly, vascular congestion []      ED Course User Index  [] Minor Soria MD          Differential diagnosis (includes but is not limited to):   CVA, TIA, ICH, electrolyte abnormalities, dehydration, kidney injury, ACS, arrhythmia, medication adverse reaction, polysubstance abuse    MDM Narrative  59-year-old male presents for evaluation of altered mental status over the past couple of days associated with some shortness of breath and swelling to the bilateral lower extremities.  Labs reviewed with multiple significant laboratory abnormalities.  CT head negative.  CT body with the setting gallbladder and distended bladder.  Acute kidney injury identified with hyperkalemia and metabolic acidosis.  Nephrology consulted and has seen the patient at bedside, potassium shifting/hyperkalemic medications given.  We will repeat a BMP to ensure no worsening of metabolic status as the patient is high-risk for needing dialysis.  Repeat BMP reviewed.  Patient admitted to the hospitalist service.    Dispo: Admit    My independent radiology interpretation: as above  Point of care US (independently performed and interpreted):   Decision rules/clinical scoring:     Sepsis Perfusion Assessment:     Amount and/or Complexity of Data Reviewed  Independent historian: EMS   Summary of history: report received   External data reviewed: notes from previous ED visits and notes from clinic visits  Summary of data reviewed: Prior records reviewed  Risk and benefits of testing: discussed   Labs: ordered and reviewed  Radiology: ordered and independent interpretation performed (see above or ED course)  ECG/medicine tests: ordered and independent interpretation performed (see above or ED course)  Discussion of management or test interpretation with external provider(s): discussed with hospitalist physician and  discussed with nephrology consultant   Summary of discussion: as above    Risk  Parenteral controlled substances   Drug therapy requiring intense monitoring for toxicity   Decision regarding hospitalization  Shared decision making     Critical Care   minutes     Data Reviewed/Counseling: I have personally reviewed the patient's vital signs, nursing notes, and other relevant tests, information, and imaging. I had a detailed discussion regarding the historical points, exam findings, and any diagnostic results supporting the discharge diagnosis. I personally performed the history, PE, MDM and procedures as documented above and agree with the scribe's documentation.    Portions of this note were dictated using voice recognition software. Although it was reviewed for accuracy, some inherent voice recognition errors may have occurred and may be present in this document.                   Clinical Impression:  Final diagnoses:  [R41.82] AMS (altered mental status)  [E87.70] Volume overload  [N17.9] Acute renal failure, unspecified acute renal failure type (Primary)  [E87.5] Hyperkalemia  [E87.20] Metabolic acidosis          ED Disposition Condition    Admit Stable                Minor Soria MD  12/27/24 9309

## 2024-12-15 NOTE — CONSULTS
Nephrology Consult    Reason for Consult:     Acute renal failure     HPI:  Trey Hancock is a 59 y.o. male with a history of HTN, obesity, PTSD, depression, schizophrenia, JANETH, polysubstance abuse (alcohol, cocaine, benzos, and opiates), and recent history of incisional hernia repair by Dr. Delacruz 11/21/2024.     Brought to the ER today 12/15/2024 by his wife with a chief complaint of altered mental status and pitting edema to bilateral lower legs. His wife is at the bedside, states he recovered very well from the hernia repair and denied any abd pain, n/v. However she does tell me he has been having trouble emptying his bladder for the past few days. A vicente was placed in the ER with immediate return of over 2,000L, after emptying the bag he has produced another 800ml.     Upon admission, he was found to have creatinine of 4.89, BUN 87.4, potassium 6.1, sodium 120, EGFR of 13, Mag 2.2, unremarkable UA, normal troponin, , CPK of 891.  UDS is pending.  Retroperitoneal ultrasound is also pending.  Blood pressure has been low since admission with the lowest recorded reading of 78/53. Currently has 98% O2 saturation on room air. He appears ill and weak, but is oriented to time, place, and person. He has no personal hx of obstructive uropathy, nephrolithiasis, kidney disease. No family hx of renal disease. Wife states he has been compliant with all of his medications. No NSAID use.       PCP:  [facility]    Review of patient's allergies indicates:  No Known Allergies    Current Facility-Administered Medications   Medication Dose Route Frequency Provider Last Rate Last Admin    calcium gluconate 1 g in NS IVPB (premixed)  1 g Intravenous Q10 Min PRN Minor Soria MD         Current Outpatient Medications   Medication Sig Dispense Refill    amitriptyline (ELAVIL) 50 MG tablet Take 50 mg by mouth every evening.      ASCORBIC ACID, BULK, MISC Take 1,000 mg by mouth every evening.      busPIRone  (BUSPAR) 15 MG tablet Take 15 mg by mouth 2 (two) times daily.      calcium carbonate 260 mg calcium (650 mg) Chew Take 2 tablets by mouth Daily.      carboxymethylcellulose (REFRESH PLUS) 0.5 % Dpet Place 1 drop into both eyes 3 (three) times daily as needed (dry eye).      citalopram (CELEXA) 40 MG tablet Take 40 mg by mouth once daily.      clonazePAM (KLONOPIN) 2 MG Tab Take 2 mg by mouth 2 (two) times daily as needed.      cloNIDine (CATAPRES) 0.1 MG tablet Take 1 tablet by mouth once daily.      divalproex ER (DEPAKOTE ER) 500 MG Tb24 24 hr tablet Take 500 mg by mouth every evening.      ferrous gluconate (FERGON) 324 MG tablet Take 324 mg by mouth every evening.      fluticasone propionate (FLONASE) 50 mcg/actuation nasal spray Flonase Allergy Relief Take 1 spray(s) (nasal) 2 times per day for 7 days [id number] spray,suspension 2 times per day nasal 7 days active 50 mcg/actuation      fluvoxaMINE (LUVOX) 100 MG tablet Take 100 mg by mouth 2 (two) times a day.      furosemide (LASIX) 40 MG tablet Take 40 mg by mouth once daily.      gabapentin (NEURONTIN) 300 MG capsule Take 300 mg by mouth 3 (three) times daily.      hydroCHLOROthiazide (MICROZIDE) 12.5 mg capsule Take 12.5 mg by mouth once daily.      lisinopriL (PRINIVIL,ZESTRIL) 40 MG tablet Take 20 mg by mouth once daily.      LORazepam (ATIVAN) 1 MG tablet Take 0.5 mg by mouth every 8 (eight) hours as needed for Anxiety.      meclizine (ANTIVERT) 12.5 mg tablet Take 12.5 mg by mouth 2 (two) times daily as needed for Dizziness.      methocarbamoL (ROBAXIN) 750 MG Tab Take 750 mg by mouth 2 (two) times daily as needed (pain).      metoprolol tartrate (LOPRESSOR) 50 MG tablet Take 25 mg by mouth 2 (two) times daily.      multivitamin (THERAGRAN) per tablet Take 1 tablet by mouth once daily.      OLANZapine (ZYPREXA) 20 MG tablet Take 20 mg by mouth nightly.      potassium chloride SA (K-DUR,KLOR-CON) 20 MEQ tablet Take 20 mEq by mouth once daily.       pramipexole (MIRAPEX) 1 MG tablet Take 2 mg by mouth every evening.      QUEtiapine (SEROQUEL) 25 MG Tab Take 25 mg by mouth every evening.      senna-docusate 8.6-50 mg (PERICOLACE) 8.6-50 mg per tablet Take 2 tablets by mouth 2 (two) times a day.      simvastatin (ZOCOR) 40 MG tablet Take 20 mg by mouth once daily.      timoloL (BETIMOL) 0.5 % ophthalmic solution Place 1 drop into both eyes 2 (two) times daily.      trihexyphenidyL (ARTANE) 2 MG tablet Take 2 mg by mouth 2 (two) times daily with meals.      vitamin D (VITAMIN D3) 1000 units Tab Take 1,000 Units by mouth once daily.      acetaminophen (TYLENOL) 325 MG tablet Take 2 tablets by mouth every 6 (six) hours as needed for Pain.      HYDROcodone-acetaminophen (NORCO) 5-325 mg per tablet Take 1 tablet by mouth every 6 (six) hours as needed for Pain. 10 tablet 0    METOPROLOL SUCCINATE ORAL metoprolol succinate Take No date recorded No form recorded No frequency recorded No route recorded No set duration recorded No set duration amount recorded active No dosage strength recorded No dosage strength units of measure recorded       (Not in a hospital admission)      Past Medical History:  Past Medical History:   Diagnosis Date    Abdominal pain     Hernia pain    Anemia     Anxiety     Arthritis of knee, right     Bowel obstruction     Carpal tunnel syndrome on both sides     Chronic pancreatitis     Constipation     Depression     Fatigue     HLD (hyperlipidemia)     Hypertension     Incisional hernia without obstruction or gangrene     Insomnia     Low back pain     Lumbar degenerative disc disease     Neuropathy     Obesity     Osteoarthritis     PTSD (post-traumatic stress disorder)     Restless leg     Right hip pain     Right knee pain     Schizophrenia     Sleep apnea     CPAP    Use of cane as ambulatory aid     Uses walker     Vertigo     Weakness        Past Surgical History:  Past Surgical History:   Procedure Laterality Date    abdominal wall  hematoma drainage  2010    p bowel obstruction surgery    COLONOSCOPY      ESOPHAGOGASTRODUODENOSCOPY      EXPLORATORY LAPAROTOMY  2010    bowel obstruction    FINGER SURGERY Left     pinky repair after work injury    HIP REPLACEMENT ARTHROPLASTY Right     LAPAROSCOPIC NISSEN FUNDOPLICATION      ROBOT-ASSISTED LAPAROSCOPIC REPAIR OF INCISIONAL HERNIA N/A 11/21/2024    Procedure: ROBOTIC REPAIR, HERNIA, INCISIONAL;  Surgeon: Jose Delacruz MD;  Location: [facility];  Service: Oncology;  Laterality: N/A;  INCISIONAL HERNIA WITH MESH    ROTATOR CUFF REPAIR Left     VARICOSE VEIN SURGERY Left     leg       Family History:  No family history on file.    Social History:  Social History     Tobacco Use    Smoking status: Never    Smokeless tobacco: Never   Substance Use Topics    Alcohol use: Not Currently          Review of Systems:    Review of Systems   Constitutional:  Negative for appetite change, fever and unexpected weight change.   Respiratory:  Negative for cough and shortness of breath.    Cardiovascular:  Negative for chest pain and leg swelling.   Gastrointestinal:  Negative for abdominal distention, abdominal pain, anal bleeding, blood in stool, constipation, diarrhea, nausea and vomiting.   Genitourinary:  Positive for difficulty urinating. Negative for decreased urine volume, dysuria, enuresis and hematuria.   Musculoskeletal:  Negative for back pain and myalgias.   Skin:  Negative for color change and pallor.   Neurological:  Positive for weakness. Negative for speech difficulty.   Psychiatric/Behavioral:  Positive for confusion.    All other systems reviewed and are negative.      Objective:    VITAL SIGNS: 24 HR MIN & MAX LAST  Temp  Min: 98.1 °F (36.7 °C)  Max: 98.1 °F (36.7 °C) 98.1 °F (36.7 °C)  BP  Min: 78/53  Max: 168/103 (!) 84/59  Pulse  Min: 93  Max: 110 105  Resp  Min: 15  Max: 24 (!) 24  SpO2  Min: 92 %  Max: 100 % 96 %      Intake/Output Summary (Last 24 hours) at 12/15/2024 1514  Last data  filed at 12/15/2024 1430  Gross per 24 hour   Intake 800 ml   Output 1700 ml   Net -900 ml       Physical Exam  Vitals and nursing note reviewed.   Constitutional:       General: He is not in acute distress.     Appearance: He is ill-appearing and toxic-appearing.   HENT:      Head: Normocephalic and atraumatic.   Eyes:      General: No scleral icterus.     Extraocular Movements: Extraocular movements intact.   Cardiovascular:      Rate and Rhythm: Normal rate and regular rhythm.      Heart sounds: Normal heart sounds.   Pulmonary:      Effort: Pulmonary effort is normal. No respiratory distress.      Breath sounds: Normal breath sounds.   Abdominal:      General: Abdomen is flat. Bowel sounds are normal. There is no distension.      Palpations: Abdomen is soft.      Tenderness: There is no abdominal tenderness.      Comments: Protuberant but benign abd. Palpable, nontender hernia with surgical incision   Genitourinary:     Comments: Deferred, vicente with dark yellow urine  Musculoskeletal:         General: No swelling or deformity. Normal range of motion.      Right lower leg: No edema.      Left lower leg: No edema.      Comments: Mild nonpitting edema of BLE   Skin:     General: Skin is warm and dry.   Neurological:      Mental Status: He is alert.      Comments: Awake, oriented x 3.    Psychiatric:         Mood and Affect: Mood normal.       Dialysis Access: none    Recent Results (from the past 48 hours)   EKG 12-lead    Collection Time: 12/15/24 10:33 AM   Result Value Ref Range    QRS Duration 106 ms    [facility] QTC Calculation 442 ms   Comprehensive metabolic panel    Collection Time: 12/15/24 10:52 AM   Result Value Ref Range    Sodium 120 (L) 136 - 145 mmol/L    Potassium 6.1 (H) 3.5 - 5.1 mmol/L    Chloride 87 (L) 98 - 107 mmol/L    CO2 20 (L) 22 - 29 mmol/L    Glucose 65 (L) 74 - 100 mg/dL    Blood Urea Nitrogen 87.4 (H) 8.4 - 25.7 mg/dL    Creatinine 4.89 (H) 0.72 - 1.25 mg/dL    Calcium 7.4 (L) 8.4 - 10.2  mg/dL    Protein Total 6.1 (L) 6.4 - 8.3 gm/dL    Albumin 2.3 (L) 3.5 - 5.0 g/dL    Globulin 3.8 (H) 2.4 - 3.5 gm/dL    Albumin/Globulin Ratio 0.6 (L) 1.1 - 2.0 ratio    Bilirubin Total 0.5 <=1.5 mg/dL    ALP 88 40 - 150 unit/L    ALT 34 0 - 55 unit/L    AST 51 (H) 5 - 34 unit/L    eGFR 13 mL/min/1.73/m2    Anion Gap 13.0 mEq/L    BUN/Creatinine Ratio 18    Troponin I    Collection Time: 12/15/24 10:52 AM   Result Value Ref Range    Troponin-I 0.031 0.000 - 0.045 ng/mL   Magnesium    Collection Time: 12/15/24 10:52 AM   Result Value Ref Range    Magnesium Level 2.20 1.60 - 2.60 mg/dL   Protime-INR    Collection Time: 12/15/24 10:52 AM   Result Value Ref Range    PT 15.7 (H) 12.5 - 14.5 seconds    INR 1.3 <=1.3   Brain natriuretic peptide    Collection Time: 12/15/24 10:52 AM   Result Value Ref Range    Natriuretic Peptide 456.9 (H) <=100.0 pg/mL   CBC with Differential    Collection Time: 12/15/24 10:52 AM   Result Value Ref Range    WBC 11.70 (H) 4.50 - 11.50 x10(3)/mcL    RBC 3.00 (L) 4.70 - 6.10 x10(6)/mcL    Hgb 9.1 (L) 14.0 - 18.0 g/dL    Hct 26.5 (L) 42.0 - 52.0 %    MCV 88.3 80.0 - 94.0 fL    MCH 30.3 27.0 - 31.0 pg    MCHC 34.3 33.0 - 36.0 g/dL    RDW 15.2 11.5 - 17.0 %    Platelet 173 130 - 400 x10(3)/mcL    MPV 8.7 7.4 - 10.4 fL    NRBC% 0.0 %   Manual Differential    Collection Time: 12/15/24 10:52 AM   Result Value Ref Range    WBC 11.7 x10(3)/mcL    Neutrophils % 78 %    Lymphs % 6 %    Monocytes % 10 %    Basophils % 1 %    Metamyelocytes % 4 (H) <=0 %    Myelocytes % 2 (H) <=0 %    Neutrophils Abs 9.126 2.1 - 9.2 x10(3)/mcL    Lymphs Abs 0.702 0.6 - 4.6 x10(3)/mcL    Monocytes Abs 1.17 0.1 - 1.3 x10(3)/mcL    Basophils Abs 0.117 0 - 0.2 x10(3)/mcL    Platelets Normal Normal, Adequate    RBC Morph Abnormal (A) Normal    Polychromasia Slight (A) (none)    Poikilocytosis 1+ (A) (none)    Anisocytosis 1+ (A) (none)    Jaylen Cells 1+ (A) (none)   CPK    Collection Time: 12/15/24 10:52 AM   Result Value Ref  Range    Creatine Kinase 891 (H) 30 - 200 U/L   RT Blood Gas    Collection Time: 12/15/24 10:58 AM   Result Value Ref Range    Sample Type Arterial Blood     Sample site Right Radial Artery     Drawn by KT RRT     pH, Blood gas 7.330 (L) 7.350 - 7.450    pCO2, Blood gas 41.0 35.0 - 45.0 mmHg    pO2, Blood gas 71.0 (L) 80.0 - 100.0 mmHg    Sodium, Blood Gas 115 (LL) 137 - 145 mmol/L    Potassium, Blood Gas 6.0 (H) 3.5 - 5.0 mmol/L    Calcium Level Ionized 0.89 (L) 1.12 - 1.23 mmol/L    TOC2, Blood gas 22.9 mmol/L    Base Excess, Blood gas -4.10 (L) -2.00 - 2.00 mmol/L    sO2, Blood gas 92.7 %    HCO3, Blood gas 21.6 (L) 22.0 - 26.0 mmol/L    THb, Blood gas 9.2 (L) 12 - 16 g/dL    O2 Hb, Blood Gas 93.0 (L) 94.0 - 97.0 %    CO Hgb 2.1 (H) 0.5 - 1.5 %    Met Hgb 0.6 0.4 - 1.5 %    Oxygen Device, Blood gas RA    Lactic acid, plasma    Collection Time: 12/15/24 12:18 PM   Result Value Ref Range    Lactic Acid Level 0.6 0.5 - 2.2 mmol/L   Urinalysis, Reflex to Urine Culture    Collection Time: 12/15/24  1:09 PM    Specimen: Urine, Catheterized   Result Value Ref Range    Color, UA Yellow Yellow, Light-Yellow, Colorless, Straw, Dark-Yellow    Appearance, UA Clear Clear    Specific Gravity, UA 1.008 1.005 - 1.030    pH, UA 5.0 5.0 - 8.5    Protein, UA Negative Negative    Glucose, UA Normal Negative, Normal    Ketones, UA Negative Negative    Blood, UA Trace (A) Negative    Bilirubin, UA Negative Negative    Urobilinogen, UA Normal 0.2, 1.0, Normal    Nitrites, UA Negative Negative    Leukocyte Esterase, UA Negative Negative    RBC, UA 0-5 None Seen, 0-2, 3-5, 0-5 /HPF    WBC, UA 0-5 None Seen, 0-2, 3-5, 0-5 /HPF    Bacteria, UA Trace None Seen, Trace /HPF    Squamous Epithelial Cells, UA Trace None Seen, Trace, Rare /HPF    Mucous, UA Trace (A) None Seen /LPF       CT Chest Abdomen Pelvis Without Contrast (XPD)    Result Date: 12/15/2024  EXAMINATION: CT CHEST ABDOMEN PELVIS WITHOUT CONTRAST(XPD) CLINICAL HISTORY:  Sepsis;Acute renal failure, sob, AMS; TECHNIQUE: CT of the chest, abdomen and pelvis without intravenous contrast. Coronal and sagittal reconstructions were obtained from the axial data set. Automatic exposure control was utilized to limit radiation dose. Radiation Dose: Total DLP: 16 17 mGy*cm COMPARISON: None FINDINGS: LINES/TUBES: None. AIRWAYS: Clear centrally. LUNGS: Clear. PLEURA: No pleural effusions. No pneumothoraces. HEART AND MEDIASTINUM: The heart is mildly enlarged.  There is no pericardial effusion.  There are small calcified mediastinal and lymph nodes. SOFT TISSUES: Normal. THORACIC BONES: No acute bony pathology. ABDOMEN/PELVIS: HEPATOBILIARY: The liver is unremarkable.  The gallbladder is distended. SPLEEN: Unremarkable PANCREAS: No focal masses or ductal dilatation. ADRENALS: No adrenal nodules. KIDNEYS/URETERS: No hydronephrosis. PELVIC ORGANS/BLADDER: Over distended urinary bladder. PERITONEUM/RETROPERITONEUM: No free intraperitoneal air. No free fluid. LYMPH NODES: No lymphadenopathy. VESSELS: Mild scattered vascular calcifications. GI TRACT: Moderate colonic stool volume.  No bowel obstruction. ABDOMINOPELVIC BONES AND SOFT TISSUE: Small amount of fluid in the anterior abdominal wall..  No acute bony pathology.     1. No definite acute abnormality of the abdomen or pelvis. 2. Distended gallbladder without evidence of calcified stone. 3. Distended urinary bladder. Electronically signed by: Suni Mayes Date:    12/15/2024 Time:    12:23      Assessment & Plan:    TREVON - baseline creat 0.8, now 4.89 on admission. renal US unremarkable, UA unremarkable.   Obstructive uropathy - continue vicente, I's and O's   AMS - head CT negative, likely from acute uremia  Hypotension - IVF and mgmt per primary team   Hyperkalemia - s/p dextrose, albuterol, calcium gluconate  Hyponatremia - start normal saline  Acidosis - closely monitor     Recommendations:      - Check urine protein, creatinine and sodium    -I did explain to the pt's wife that he is high risk for requiring RRT. Depending on pt's repeat BMP we will decide whether or not to start HD today. Overall picture most consistent with acute renal failure secondary to obstructive uropathy with associated uremia.       Thank you for allowing us to participate in this patient's care.    Vianey Joaquin  [facility]    Addendum:  TREVON : likely due to obstructive uropathy. Labs reveal improving renal function after vicente catheter placement and Iv fluids.    Hyponatremia: initiate work up to include serum and urine osmolality, TSH. Likely hypovolemic hyponatremia, improving with IV NS.  Harika Gomez M.D.  Nephrology

## 2024-12-15 NOTE — H&P
[facility] Hospital Medicine History & Physical Examination       Patient Name: Trey Hancock  MRN: [id number]  Patient Class: IP- Inpatient   Admission Date: 12/15/2024   Admitting Physician: SAMUEL Service   Length of Stay: 0  Attending Physician: Dr Jag Brar  Primary Care Provider: [facility]  Face-to-Face encounter date: 12/15/2024  Code Status: Full Code  Chief Complaint: Altered Mental Status (AMS x 2 days with pitting edema to bilateral lower legs. Gcs 14 cbg-85)        Screening for Social Drivers for health:  Patient screened for food insecurity, housing instability, transportation needs, utility difficulties, and interpersonal safety (select all that apply as identified as concern)  []Housing or Food  []Transportation Needs  []Utility Difficulties  []Interpersonal safety  [x]None    Patient information was obtained from patient, patient's family, past medical records and/or ER records.     HISTORY OF PRESENT ILLNESS:   Trey Hancock is a 59 y.o. male who PMH includes anemia, anxiety, depression, osteoarthritis of the knees, carpal tunnel syndrome, chronic pancreatitis, schizoaffective disorder, HTN, HLD, chronic neck and back pain, lumbar degenerative disc disease, neuropathy, PTSD, RLS, JANETH with CPAP, vertigo; patient presented to [facility] on 12/15/2024 with a primary complaint of AMS and edema to BLE over the past 2 days. PT also reports generalized weakness with associated SOB.  Patient has a history of hernia repair by Dr Delacruz on 11/21/2024.  He has done fine postop up until a couple of days ago when it was reported per the family patient was confused and altered with generalized weakness and edema to lower extremities.  No reports chest pain, fever, chills, cough, congestion, or any sick contacts.  Patient does have a history of polysubstance abuse of alcohol, cocaine, benzodiazepines and opioids; no reports of recent abuse.  Patient also has had  issues with urinary retention and difficulty emptying his bladder over the past several days.  Indwelling catheter was placed in the ED with return of over 2000 mL of urine with an additional 800 mL in collection bag after emptying.  It is reported patient has not had any issues with his kidneys in the past. Labs reviewed demonstrated WBCs 11.70, RBCs 3.00, H&H 9.1/26.5, PT 15.7, INR 1.3, sodium 120 we will repeat values 123, potassium 6.1 with repeat values 4.6, chloride 87 will repeat values 90, CO2 20 with repeat values 21; BUN 87.4 will repeat values 84.5, creatinine 4.89 with repeat values 4.50, glucose 650 repeat values 94, calcium 7.4 repeat value 7.1, .9, CPK 91, troponin 0.031; urine creatinine 32.8; other indices unremarkable. Urine drug screen positive for opiates.  Chest x-ray impression reviewed prominent bronchovascular markings which may be due to hypo aeration or mild edema.  CT head without contrast impression reviewed demonstrated no acute intracranial abnormality, chronic microvascular ischemic changes.  CT of the chest abdomen and pelvis without contrast impression reviewed demonstrated no definitive acute abnormality of the abdomen or pelvis, distended gallbladder without evidence of calcified stone, distended urinary bladder.  Renal ultrasound impression reviewed demonstrated no acute kidney abnormality.    Initial vital signs /103 pulse 100 respirations 22 temperature 98.1° F O2 saturation 100% on room air.  Patient did have drop in blood pressure with the lowest 78/53 O2 saturation dropped into the low 90s.  Patient was given IV hydration and placed on supplemental oxygen therapy with improvement in his hemodynamics.  Renal Services have been consulted per ED and evaluated the patient.  Patient is admitted to hospital medicine services for further management.    PAST MEDICAL HISTORY:     Past Medical History:   Diagnosis Date    Abdominal pain     Hernia pain    Anemia      Anxiety     Arthritis of knee, right     Bowel obstruction     Carpal tunnel syndrome on both sides     Chronic pancreatitis     Constipation     Depression     Fatigue     HLD (hyperlipidemia)     Hypertension     Incisional hernia without obstruction or gangrene     Insomnia     Low back pain     Lumbar degenerative disc disease     Neuropathy     Obesity     Osteoarthritis     PTSD (post-traumatic stress disorder)     Restless leg     Right hip pain     Right knee pain     Schizophrenia     Sleep apnea     CPAP    Use of cane as ambulatory aid     Uses walker     Vertigo     Weakness        PAST SURGICAL HISTORY:     Past Surgical History:   Procedure Laterality Date    abdominal wall hematoma drainage  2010    p bowel obstruction surgery    COLONOSCOPY      ESOPHAGOGASTRODUODENOSCOPY      EXPLORATORY LAPAROTOMY  2010    bowel obstruction    FINGER SURGERY Left     pinky repair after work injury    HIP REPLACEMENT ARTHROPLASTY Right     LAPAROSCOPIC NISSEN FUNDOPLICATION      ROBOT-ASSISTED LAPAROSCOPIC REPAIR OF INCISIONAL HERNIA N/A 11/21/2024    Procedure: ROBOTIC REPAIR, HERNIA, INCISIONAL;  Surgeon: Jose Delacruz MD;  Location: [facility];  Service: Oncology;  Laterality: N/A;  INCISIONAL HERNIA WITH MESH    ROTATOR CUFF REPAIR Left     VARICOSE VEIN SURGERY Left     leg       ALLERGIES:   Patient has no known allergies.    FAMILY HISTORY:   Reviewed and negative    SOCIAL HISTORY:   No reports of tobacco use   Patient has history of polysubstance abuse-cocaine, opiates, benzodiazepines, marijuana   No reports of recent alcohol use-history of ETOH abuse  Lives with family     HOME MEDICATIONS:   As documented  Prior to Admission medications    Medication Sig Start Date End Date Taking? Authorizing Provider   amitriptyline (ELAVIL) 50 MG tablet Take 50 mg by mouth every evening.   Yes Provider, Historical   ASCORBIC ACID, BULK, MISC Take 1,000 mg by mouth every evening. 4/4/24  Yes Provider, Historical    busPIRone (BUSPAR) 15 MG tablet Take 15 mg by mouth 2 (two) times daily.   Yes Provider, Historical   calcium carbonate 260 mg calcium (650 mg) Chew Take 2 tablets by mouth Daily. 11/3/23  Yes Provider, Historical   carboxymethylcellulose (REFRESH PLUS) 0.5 % Dpet Place 1 drop into both eyes 3 (three) times daily as needed (dry eye).   Yes Provider, Historical   citalopram (CELEXA) 40 MG tablet Take 40 mg by mouth once daily.   Yes Provider, Historical   clonazePAM (KLONOPIN) 2 MG Tab Take 2 mg by mouth 2 (two) times daily as needed.   Yes Provider, Historical   cloNIDine (CATAPRES) 0.1 MG tablet Take 1 tablet by mouth once daily.   Yes Provider, Historical   divalproex ER (DEPAKOTE ER) 500 MG Tb24 24 hr tablet Take 500 mg by mouth every evening. 9/4/24  Yes Provider, Historical   ferrous gluconate (FERGON) 324 MG tablet Take 324 mg by mouth every evening.   Yes Provider, Historical   fluticasone propionate (FLONASE) 50 mcg/actuation nasal spray Flonase Allergy Relief Take 1 spray(s) (nasal) 2 times per day for 7 days [id number] spray,suspension 2 times per day nasal 7 days active 50 mcg/actuation 10/27/22  Yes Provider, Historical   fluvoxaMINE (LUVOX) 100 MG tablet Take 100 mg by mouth 2 (two) times a day.   Yes Provider, Historical   furosemide (LASIX) 40 MG tablet Take 40 mg by mouth once daily. 7/1/24  Yes Provider, Historical   gabapentin (NEURONTIN) 300 MG capsule Take 300 mg by mouth 3 (three) times daily.   Yes Provider, Historical   hydroCHLOROthiazide (MICROZIDE) 12.5 mg capsule Take 12.5 mg by mouth once daily.   Yes Provider, Historical   lisinopriL (PRINIVIL,ZESTRIL) 40 MG tablet Take 20 mg by mouth once daily.   Yes Provider, Historical   LORazepam (ATIVAN) 1 MG tablet Take 0.5 mg by mouth every 8 (eight) hours as needed for Anxiety.   Yes Provider, Historical   meclizine (ANTIVERT) 12.5 mg tablet Take 12.5 mg by mouth 2 (two) times daily as needed for Dizziness. 7/1/24  Yes Provider, Historical    methocarbamoL (ROBAXIN) 750 MG Tab Take 750 mg by mouth 2 (two) times daily as needed (pain).   Yes Provider, Historical   metoprolol tartrate (LOPRESSOR) 50 MG tablet Take 25 mg by mouth 2 (two) times daily.   Yes Provider, Historical   multivitamin (THERAGRAN) per tablet Take 1 tablet by mouth once daily.   Yes Provider, Historical   OLANZapine (ZYPREXA) 20 MG tablet Take 20 mg by mouth nightly.   Yes Provider, Historical   potassium chloride SA (K-DUR,KLOR-CON) 20 MEQ tablet Take 20 mEq by mouth once daily.   Yes Provider, Historical   pramipexole (MIRAPEX) 1 MG tablet Take 2 mg by mouth every evening.   Yes Provider, Historical   QUEtiapine (SEROQUEL) 25 MG Tab Take 25 mg by mouth every evening.   Yes Provider, Historical   senna-docusate 8.6-50 mg (PERICOLACE) 8.6-50 mg per tablet Take 2 tablets by mouth 2 (two) times a day.   Yes Provider, Historical   simvastatin (ZOCOR) 40 MG tablet Take 20 mg by mouth once daily.   Yes Provider, Historical   timoloL (BETIMOL) 0.5 % ophthalmic solution Place 1 drop into both eyes 2 (two) times daily.   Yes Provider, Historical   trihexyphenidyL (ARTANE) 2 MG tablet Take 2 mg by mouth 2 (two) times daily with meals.   Yes Provider, Historical   vitamin D (VITAMIN D3) 1000 units Tab Take 1,000 Units by mouth once daily.   Yes Provider, Historical   acetaminophen (TYLENOL) 325 MG tablet Take 2 tablets by mouth every 6 (six) hours as needed for Pain. 7/1/24   Provider, Historical   HYDROcodone-acetaminophen (NORCO) 5-325 mg per tablet Take 1 tablet by mouth every 6 (six) hours as needed for Pain. 11/21/24   Helen Ramires, NIKHIL   METOPROLOL SUCCINATE ORAL metoprolol succinate Take No date recorded No form recorded No frequency recorded No route recorded No set duration recorded No set duration amount recorded active No dosage strength recorded No dosage strength units of measure recorded    Provider, Historical       REVIEW OF SYSTEMS:   Except as documented, all other systems  reviewed and negative     PHYSICAL EXAM:     VITAL SIGNS: 24 HRS MIN & MAX LAST   Temp  Min: 98.1 °F (36.7 °C)  Max: 98.1 °F (36.7 °C) 98.1 °F (36.7 °C)   BP  Min: 78/53  Max: 168/103 105/69   Pulse  Min: 93  Max: 110  105   Resp  Min: 15  Max: 24 18   SpO2  Min: 92 %  Max: 100 % 97 %       General appearance: Well-developed, well-nourished male chronically ill-appearing looks older than stated age fatigued, nontoxic, family at the bedside  HENT: Atraumatic head. Dry mucous membranes of oral cavity.  Eyes: PERRL   Lungs: Clear to auscultation bilaterally. No wheezing present.   Heart: Regular rate and rhythm. S1 and S2 present, cap refill brisk pitting edema to BLE  Abdomen: Soft, non-distended, non-tender. No rebound tenderness/guarding. Bowel sounds are normal.   Extremities: No cyanosis, clubbing, generalized weakness, edema BLE  Skin: warm and dry  Neuro: oriented to person and placed, intermittent confusion noted, no acute focal deficits  Psych/mental status: flat affect cooperative    LABS AND IMAGING:     Recent Labs   Lab 12/15/24  1052   WBC 11.7  11.70*   RBC 3.00*   HGB 9.1*   HCT 26.5*   MCV 88.3   MCH 30.3   MCHC 34.3   RDW 15.2      MPV 8.7       Recent Labs   Lab 12/15/24  1052 12/15/24  1058 12/15/24  1556   *  --  123*   K 6.1*  --  4.6   CL 87*  --  90*   CO2 20*  --  21*   BUN 87.4*  --  84.5*   CREATININE 4.89*  --  4.50*   CALCIUM 7.4*  --  7.1*   PH  --  7.330*  --    MG 2.20  --   --    ALBUMIN 2.3*  --   --    ALKPHOS 88  --   --    ALT 34  --   --    AST 51*  --   --    BILITOT 0.5  --   --        Microbiology Results (last 7 days)       Procedure Component Value Units Date/Time    Blood Culture #1 **CANNOT BE ORDERED STAT** [[id number]] Collected: 12/15/24 1218    Order Status: Resulted Specimen: Blood from Antecubital, Right Updated: 12/15/24 1420    Blood Culture #1 **CANNOT BE ORDERED STAT** [[id number]] Collected: 12/15/24 1218    Order Status: Resulted Specimen: Blood  from Antecubital, Right Updated: 12/15/24 1420             US Retroperitoneal Complete  Narrative: EXAMINATION:  US RETROPERITONEAL COMPLETE    CLINICAL HISTORY:  sara;    TECHNIQUE:  Ultrasound of the kidneys and urinary bladder was performed including color flow and Doppler evaluation of the kidneys.    COMPARISON:  12/15/2024 CT.    FINDINGS:  Right kidney: The right kidney measures 12.7 cm. No cortical thinning. No loss of corticomedullary distinction. No mass. No renal stone. No hydronephrosis.    Left kidney: The left kidney measures 11.8 cm. No cortical thinning. No loss of corticomedullary distinction. No mass. No renal stone. No hydronephrosis.    The urinary bladder is decompressed around a Valdez catheter  Impression: No acute kidney abnormality.    Electronically signed by: Lg Mcgee  Date:    12/15/2024  Time:    15:24  CT Chest Abdomen Pelvis Without Contrast (XPD)  Narrative: EXAMINATION:  CT CHEST ABDOMEN PELVIS WITHOUT CONTRAST(XPD)    CLINICAL HISTORY:  Sepsis;Acute renal failure, sob, AMS;    TECHNIQUE:  CT of the chest, abdomen and pelvis without intravenous contrast.    Coronal and sagittal reconstructions were obtained from the axial data set.    Automatic exposure control was utilized to limit radiation dose.    Radiation Dose:    Total DLP: 16 17 mGy*cm    COMPARISON:  None    FINDINGS:  LINES/TUBES: None.    AIRWAYS: Clear centrally.    LUNGS: Clear.    PLEURA: No pleural effusions. No pneumothoraces.    HEART AND MEDIASTINUM: The heart is mildly enlarged.  There is no pericardial effusion.  There are small calcified mediastinal and lymph nodes.    SOFT TISSUES: Normal.    THORACIC BONES: No acute bony pathology.    ABDOMEN/PELVIS:    HEPATOBILIARY: The liver is unremarkable.  The gallbladder is distended.    SPLEEN: Unremarkable    PANCREAS: No focal masses or ductal dilatation.    ADRENALS: No adrenal nodules.    KIDNEYS/URETERS: No hydronephrosis.    PELVIC ORGANS/BLADDER: Over  distended urinary bladder.    PERITONEUM/RETROPERITONEUM: No free intraperitoneal air. No free fluid.    LYMPH NODES: No lymphadenopathy.    VESSELS: Mild scattered vascular calcifications.    GI TRACT: Moderate colonic stool volume.  No bowel obstruction.    ABDOMINOPELVIC BONES AND SOFT TISSUE: Small amount of fluid in the anterior abdominal wall..  No acute bony pathology.  Impression: 1. No definite acute abnormality of the abdomen or pelvis.  2. Distended gallbladder without evidence of calcified stone.  3. Distended urinary bladder.    Electronically signed by: Suni Maeys  Date:    12/15/2024  Time:    12:23  CT Head Without Contrast  Narrative: EXAMINATION:  CT HEAD WITHOUT CONTRAST    CLINICAL HISTORY:  Mental status change, unknown cause;    TECHNIQUE:  Axial scans were obtained from skull base to the vertex.    Coronal and sagittal reconstructions obtained from the axial data.    Automatic exposure control was utilized to limit radiation dose.    Contrast: None    Radiation Dose:    Total DLP: 2446 mGy*cm    COMPARISON:  CT head dated 11/18/2022    FINDINGS:  Evaluation is limited by motion artifact.  There is no acute intracranial hemorrhage or edema. The gray-white matter differentiation is preserved.  Scattered hypodensities in the subcortical and periventricular white matter likely represent chronic microvascular ischemic changes.    There is no mass effect or midline shift. The ventricles and sulci are normal in size. The basal cisterns are patent. There is no abnormal extra-axial fluid collection.  Carotid and vertebral artery calcifications are noted.    The calvarium and skull base are intact. The visualized paranasal sinuses and the mastoid air cells are clear.  Impression: 1. No acute intracranial abnormality.  2. Chronic microvascular ischemic changes.    Electronically signed by: Suni Mayes  Date:    12/15/2024  Time:    12:17  X-Ray Chest AP Portable  Narrative: EXAMINATION:  XR  CHEST AP PORTABLE    CLINICAL HISTORY:  Altered mental status, unspecified    TECHNIQUE:  Single frontal view of the chest was performed.    COMPARISON:  10/06/2024.    FINDINGS:  Unchanged cardiac silhouette.  The lungs are hypoaerated.  Prominent bronchovascular markings.  No pleural effusion or pneumothorax.  No acute osseous abnormalities.  Impression: Prominent bronchovascular markings which may be due to hypoaeration or mild edema    Electronically signed by: Lg Mcgee  Date:    12/15/2024  Time:    11:28        ASSESSMENT & PLAN:   ASSESSMENT:  Acute encephalopathy- metabolic- POA  Acute renal failure with elevated BUN and creatinine-POA   Acute urinary retention-requiring indwelling catheter-POA   Hypovolemic hypotension-POA   Hyperkalemia-suspected secondary to acute renal failure-POA   Anemia-chronic disease-POA  Electrolyte derangements-hyponatremia, hypochloremia hyperglycemia-POA  Weakness- POA    Hx of chronic neck and back pain, history of polysubstance abuse, anxiety, depression, schizoaffective disorder, HLD, HTN, osteoarthritis, JANETH with CPAP, DJD knees, carpal tunnel syndrome, chronic pancreatitis, PTSD, RLS, vertigo    PLAN:  Consult Renal Services appreciate assistance and recommendations   Continue with IV hydration   Fall precautions   Accurate I&O   Daily weights   Avoid any nephrotoxic agents   Resume home medication as deemed necessary   Labs in the am  Monitor blood pressure closely      VTE Prophylaxis: Heparin for  DVT prophylaxis and will be advised to be as mobile as possible and sit in a chair as tolerated    Patient condition:  Stable    I, KAZ Steele have reviewed and discussed the case with   Dr. Jag Brar.   Please see the following addendum for further assessment and plan from their attending MD.  KAZ Cisneros   12/15/2024      ________________________________________________________________________  IDr. Jag assumed care  of this patient.  For the patient encounter, I performed the substantive portion of the visit, I reviewed the NPPA documentation, treatment plan, and medical decision making.  I had face to face time with this patient.  I have personally reviewed the labs and test results that are presently available. I have reviewed or attempted to review medical records based upon their availability. If patient was admitted under observational status it is with my approval.      Pleasant 59-year-old male was having difficulty urinating for a few days, then became weak and confused.  Workup showed acute urinary retention and acute renal failure.  CT abdomen and pelvis showed significant bladder distention renal ultrasound unremarkable.  A Valdez catheter was placed.  He is having some hypotension afterwards likely from postvoid diuresis but he is responding to IV fluids.  Renal function is improving.  Sensorium has cleared.      Unclear etiology of urinary retention but seems he was started on amitriptyline and Zyprexa recently and amitriptyline can cause urinary retention.  However this was started about a month ago.  Consider decreasing dose at discharge.  Will hold for now.  We will need Urology follow up if unable to self void before discharge.    Time seen:  11:00 p.m. 12/15  Critical care time: 35 min; Critical care diagnosis:  Hypotension requiring IV fluid boluses  Jag Brar MD

## 2024-12-16 LAB
ABS NEUT (OLG): 9.01 X10(3)/MCL (ref 2.1–9.2)
ALBUMIN SERPL-MCNC: 2.1 G/DL (ref 3.5–5)
ALBUMIN/GLOB SERPL: 0.6 RATIO (ref 1.1–2)
ALP SERPL-CCNC: 82 UNIT/L (ref 40–150)
ALT SERPL-CCNC: 32 UNIT/L (ref 0–55)
ANION GAP SERPL CALC-SCNC: 11 MEQ/L
APICAL FOUR CHAMBER EJECTION FRACTION: 69 %
APICAL TWO CHAMBER EJECTION FRACTION: 56 %
AST SERPL-CCNC: 39 UNIT/L (ref 5–34)
AV INDEX (PROSTH): 0.72
AV MEAN GRADIENT: 10 MMHG
AV PEAK GRADIENT: 17.6 MMHG
AV VELOCITY RATIO: 0.67
BILIRUB SERPL-MCNC: 0.4 MG/DL
BSA FOR ECHO PROCEDURE: 2.16 M2
BUN SERPL-MCNC: 80.1 MG/DL (ref 8.4–25.7)
BURR CELLS (OLG): ABNORMAL
CALCIUM SERPL-MCNC: 7.5 MG/DL (ref 8.4–10.2)
CHLORIDE SERPL-SCNC: 96 MMOL/L (ref 98–107)
CO2 SERPL-SCNC: 22 MMOL/L (ref 22–29)
CORTIS SERPL-SCNC: 6.2 UG/DL
CREAT SERPL-MCNC: 3.54 MG/DL (ref 0.72–1.25)
CREAT/UREA NIT SERPL: 23
CV ECHO LV RWT: 0.35 CM
DOP CALC AO PEAK VEL: 2.1 M/S
DOP CALC AO VTI: 33 CM
DOP CALC LVOT PEAK VEL: 1.4 M/S
DOP CALCLVOT PEAK VEL VTI: 23.7 CM
E WAVE DECELERATION TIME: 172 MSEC
E/A RATIO: 1.23
E/E' RATIO: 8.36 M/S
ECHO LV POSTERIOR WALL: 0.9 CM (ref 0.6–1.1)
EOSINOPHIL NFR BLD MANUAL: 0.31 X10(3)/MCL (ref 0–0.9)
EOSINOPHIL NFR BLD MANUAL: 3 %
ERYTHROCYTE [DISTWIDTH] IN BLOOD BY AUTOMATED COUNT: 15.5 % (ref 11.5–17)
FRACTIONAL SHORTENING: 26.9 % (ref 28–44)
GFR SERPLBLD CREATININE-BSD FMLA CKD-EPI: 19 ML/MIN/1.73/M2
GLOBULIN SER-MCNC: 3.7 GM/DL (ref 2.4–3.5)
GLUCOSE SERPL-MCNC: 71 MG/DL (ref 74–100)
HCT VFR BLD AUTO: 25.4 % (ref 42–52)
HGB BLD-MCNC: 8.7 G/DL (ref 14–18)
INSTRUMENT WBC (OLG): 10.48 X10(3)/MCL
INTERVENTRICULAR SEPTUM: 1 CM (ref 0.6–1.1)
LEFT ATRIUM AREA SYSTOLIC (APICAL 2 CHAMBER): 26.9 CM2
LEFT ATRIUM AREA SYSTOLIC (APICAL 4 CHAMBER): 26.5 CM2
LEFT ATRIUM SIZE: 4.1 CM
LEFT ATRIUM VOLUME INDEX MOD: 40 ML/M2
LEFT ATRIUM VOLUME MOD: 83.1 ML
LEFT INTERNAL DIMENSION IN SYSTOLE: 3.8 CM (ref 2.1–4)
LEFT VENTRICLE DIASTOLIC VOLUME INDEX: 62.5 ML/M2
LEFT VENTRICLE DIASTOLIC VOLUME: 130 ML
LEFT VENTRICLE END DIASTOLIC VOLUME APICAL 2 CHAMBER: 118 ML
LEFT VENTRICLE END DIASTOLIC VOLUME APICAL 4 CHAMBER: 140 ML
LEFT VENTRICLE END SYSTOLIC VOLUME APICAL 2 CHAMBER: 85.4 ML
LEFT VENTRICLE END SYSTOLIC VOLUME APICAL 4 CHAMBER: 80.9 ML
LEFT VENTRICLE MASS INDEX: 87.2 G/M2
LEFT VENTRICLE SYSTOLIC VOLUME INDEX: 29.6 ML/M2
LEFT VENTRICLE SYSTOLIC VOLUME: 61.6 ML
LEFT VENTRICULAR INTERNAL DIMENSION IN DIASTOLE: 5.2 CM (ref 3.5–6)
LEFT VENTRICULAR MASS: 181.4 G
LV LATERAL E/E' RATIO: 7.8 M/S
LV SEPTAL E/E' RATIO: 9 M/S
LVED V (TEICH): 130 ML
LVES V (TEICH): 61.6 ML
LVOT MG: 4 MMHG
LVOT MV: 0.98 CM/S
LYMPHOCYTES NFR BLD MANUAL: 0.42 X10(3)/MCL
LYMPHOCYTES NFR BLD MANUAL: 4 %
MACROCYTES BLD QL SMEAR: ABNORMAL
MAGNESIUM SERPL-MCNC: 2.2 MG/DL (ref 1.6–2.6)
MCH RBC QN AUTO: 30.1 PG (ref 27–31)
MCHC RBC AUTO-ENTMCNC: 34.3 G/DL (ref 33–36)
MCV RBC AUTO: 87.9 FL (ref 80–94)
METAMYELOCYTES NFR BLD MANUAL: 2 %
MONOCYTES NFR BLD MANUAL: 0.52 X10(3)/MCL (ref 0.1–1.3)
MONOCYTES NFR BLD MANUAL: 5 %
MV PEAK A VEL: 0.95 M/S
MV PEAK E VEL: 1.17 M/S
NEUTROPHILS NFR BLD MANUAL: 86 %
NRBC BLD AUTO-RTO: 0 %
OHS LV EJECTION FRACTION SIMPSONS BIPLANE MOD: 63 %
PISA TR MAX VEL: 2.41 M/S
PLATELET # BLD AUTO: 164 X10(3)/MCL (ref 130–400)
PLATELET # BLD EST: NORMAL 10*3/UL
PMV BLD AUTO: 8.7 FL (ref 7.4–10.4)
POCT GLUCOSE: 106 MG/DL (ref 70–110)
POCT GLUCOSE: 123 MG/DL (ref 70–110)
POCT GLUCOSE: 70 MG/DL (ref 70–110)
POIKILOCYTOSIS BLD QL SMEAR: ABNORMAL
POTASSIUM SERPL-SCNC: 5.3 MMOL/L (ref 3.5–5.1)
PROT SERPL-MCNC: 5.8 GM/DL (ref 6.4–8.3)
PV PEAK GRADIENT: 8 MMHG
PV PEAK VELOCITY: 1.42 M/S
RA PRESSURE ESTIMATED: 3 MMHG
RBC # BLD AUTO: 2.89 X10(6)/MCL (ref 4.7–6.1)
RBC MORPH BLD: ABNORMAL
RIGHT VENTRICULAR END-DIASTOLIC DIMENSION: 4.83 CM
RV TB RVSP: 5 MMHG
SODIUM SERPL-SCNC: 129 MMOL/L (ref 136–145)
TDI LATERAL: 0.15 M/S
TDI SEPTAL: 0.13 M/S
TDI: 0.14 M/S
TR MAX PG: 23 MMHG
TRICUSPID ANNULAR PLANE SYSTOLIC EXCURSION: 2.75 CM
TV REST PULMONARY ARTERY PRESSURE: 26 MMHG
WBC # BLD AUTO: 10.48 X10(3)/MCL (ref 4.5–11.5)
Z-SCORE OF LEFT VENTRICULAR DIMENSION IN END DIASTOLE: -2.03
Z-SCORE OF LEFT VENTRICULAR DIMENSION IN END SYSTOLE: -0.2

## 2024-12-16 PROCEDURE — 63600175 PHARM REV CODE 636 W HCPCS: Performed by: NURSE PRACTITIONER

## 2024-12-16 PROCEDURE — 85025 COMPLETE CBC W/AUTO DIFF WBC: CPT | Performed by: PHYSICIAN ASSISTANT

## 2024-12-16 PROCEDURE — 83735 ASSAY OF MAGNESIUM: CPT | Performed by: PHYSICIAN ASSISTANT

## 2024-12-16 PROCEDURE — 36415 COLL VENOUS BLD VENIPUNCTURE: CPT | Performed by: INTERNAL MEDICINE

## 2024-12-16 PROCEDURE — 25000003 PHARM REV CODE 250: Performed by: INTERNAL MEDICINE

## 2024-12-16 PROCEDURE — 80053 COMPREHEN METABOLIC PANEL: CPT | Performed by: INTERNAL MEDICINE

## 2024-12-16 PROCEDURE — 21400001 HC TELEMETRY ROOM

## 2024-12-16 PROCEDURE — 82533 TOTAL CORTISOL: CPT | Performed by: PHYSICIAN ASSISTANT

## 2024-12-16 PROCEDURE — 27000221 HC OXYGEN, UP TO 24 HOURS

## 2024-12-16 PROCEDURE — 25000003 PHARM REV CODE 250: Performed by: NURSE PRACTITIONER

## 2024-12-16 RX ADMIN — DIVALPROEX SODIUM 500 MG: 250 TABLET, EXTENDED RELEASE ORAL at 08:12

## 2024-12-16 RX ADMIN — SODIUM ZIRCONIUM CYCLOSILICATE 10 G: 10 POWDER, FOR SUSPENSION ORAL at 06:12

## 2024-12-16 RX ADMIN — QUETIAPINE FUMARATE 25 MG: 25 TABLET ORAL at 08:12

## 2024-12-16 RX ADMIN — AMITRIPTYLINE HYDROCHLORIDE 50 MG: 50 TABLET, FILM COATED ORAL at 08:12

## 2024-12-16 RX ADMIN — CITALOPRAM HYDROBROMIDE 40 MG: 20 TABLET ORAL at 08:12

## 2024-12-16 RX ADMIN — MUPIROCIN: 20 OINTMENT TOPICAL at 08:12

## 2024-12-16 RX ADMIN — FERROUS SULFATE TAB 325 MG (65 MG ELEMENTAL FE) 1 EACH: 325 (65 FE) TAB at 08:12

## 2024-12-16 RX ADMIN — FLUVOXAMINE MALEATE 100 MG: 50 TABLET ORAL at 08:12

## 2024-12-16 RX ADMIN — HEPARIN SODIUM 5000 UNITS: 5000 INJECTION, SOLUTION INTRAVENOUS; SUBCUTANEOUS at 08:12

## 2024-12-16 RX ADMIN — CALCIUM CARBONATE 500 MG: 500 TABLET, CHEWABLE ORAL at 06:12

## 2024-12-16 RX ADMIN — OLANZAPINE 20 MG: 5 TABLET, FILM COATED ORAL at 08:12

## 2024-12-16 RX ADMIN — TIMOLOL MALEATE 1 DROP: 5 SOLUTION OPHTHALMIC at 08:12

## 2024-12-16 RX ADMIN — PRAMIPEXOLE DIHYDROCHLORIDE 1 MG: 0.25 TABLET ORAL at 08:12

## 2024-12-16 NOTE — PLAN OF CARE
12/16/24 1306   Discharge Assessment   Assessment Type Discharge Planning Assessment   Confirmed/corrected address, phone number and insurance Yes   Confirmed Demographics Correct on Facesheet   Source of Information patient   Communicated INNA with patient/caregiver Date not available/Unable to determine   Reason For Admission Acute renal failure   People in Home spouse   Do you expect to return to your current living situation? Yes   Do you have help at home or someone to help you manage your care at home? Yes   Who are your caregiver(s) and their phone number(s)? Aki Hancock (Spouse)  [phone] (   Prior to hospitilization cognitive status: Alert/Oriented   Current cognitive status: Alert/Oriented   Walking or Climbing Stairs Difficulty yes   Walking or Climbing Stairs ambulation difficulty, requires equipment   Mobility Management rollator   Dressing/Bathing Difficulty yes   Dressing/Bathing bathing difficulty, requires equipment   Dressing/Bathing Management shower chair   Equipment Currently Used at Home shower chair;rollator;CPAP;blood pressure machine   Readmission within 30 days? No   Patient currently being followed by outpatient case management? No   Do you currently have service(s) that help you manage your care at home? No   Do you take prescription medications? Yes   Do you have prescription coverage? Yes   Coverage [facility]   Do you have any problems affording any of your prescribed medications? No   Is the patient taking medications as prescribed? yes   Who is going to help you get home at discharge? Aki Hancock (Spouse)  [phone] (   How do you get to doctors appointments? family or friend will provide   Are you on dialysis? No   Do you take coumadin? No   Discharge Plan A Home with family   Discharge Plan B Home with family   DME Needed Upon Discharge  none   Discharge Plan discussed with: Patient;Spouse/sig other   Name(s) and Number(s) Aki Hancock (Spouse)  [phone] (    Transition of Care Barriers None   Physical Activity   On average, how many days per week do you engage in moderate to strenuous exercise (like a brisk walk)? 0 days   On average, how many minutes do you engage in exercise at this level? 0 min   Financial Resource Strain   How hard is it for you to pay for the very basics like food, housing, medical care, and heating? Not very   Housing Stability   In the last 12 months, was there a time when you were not able to pay the mortgage or rent on time? N   At any time in the past 12 months, were you homeless or living in a shelter (including now)? N   Transportation Needs   Has the lack of transportation kept you from medical appointments, meetings, work or from getting things needed for daily living? No   Food Insecurity   Within the past 12 months, you worried that your food would run out before you got the money to buy more. Never true   Within the past 12 months, the food you bought just didn't last and you didn't have money to get more. Never true   Stress   Do you feel stress - tense, restless, nervous, or anxious, or unable to sleep at night because your mind is troubled all the time - these days? To some exte   Social Isolation   How often do you feel lonely or isolated from those around you?  Never   Alcohol Use   Q1: How often do you have a drink containing alcohol? Never   Utilities   In the past 12 months has the electric, gas, oil, or water company threatened to shut off services in your home? No   Health Literacy   How often do you need to have someone help you when you read instructions, pamphlets, or other written material from your doctor or pharmacy? Sometimes   OTHER   Name(s) of People in Home Aki Hancock (Spouse)  [phone] (

## 2024-12-16 NOTE — PROGRESS NOTES
[facility] Hospital Medicine Progress Note        Chief Complaint: Inpatient Follow-up for ARF    HPI:   Trey Hancock is a 59 y.o. male who PMH includes anemia, anxiety, depression, osteoarthritis of the knees, carpal tunnel syndrome, chronic pancreatitis, schizoaffective disorder, HTN, HLD, chronic neck and back pain, lumbar degenerative disc disease, neuropathy, PTSD, RLS, JANETH with CPAP, vertigo; patient presented to [facility] on 12/15/2024 with a primary complaint of AMS and edema to BLE over the past 2 days. PT also reports generalized weakness with associated SOB.  Patient has a history of hernia repair by Dr Delacruz on 11/21/2024.  He has done fine postop up until a couple of days ago when it was reported per the family patient was confused and altered with generalized weakness and edema to lower extremities.  No reports chest pain, fever, chills, cough, congestion, or any sick contacts.  Patient does have a history of polysubstance abuse of alcohol, cocaine, benzodiazepines and opioids; no reports of recent abuse.  Patient also has had issues with urinary retention and difficulty emptying his bladder over the past several days.  Indwelling catheter was placed in the ED with return of over 2000 mL of urine with an additional 800 mL in collection bag after emptying.  It is reported patient has not had any issues with his kidneys in the past. Labs reviewed demonstrated WBCs 11.70, RBCs 3.00, H&H 9.1/26.5, PT 15.7, INR 1.3, sodium 120 we will repeat values 123, potassium 6.1 with repeat values 4.6, chloride 87 will repeat values 90, CO2 20 with repeat values 21; BUN 87.4 will repeat values 84.5, creatinine 4.89 with repeat values 4.50, glucose 650 repeat values 94, calcium 7.4 repeat value 7.1, .9, CPK 91, troponin 0.031; urine creatinine 32.8; other indices unremarkable. Urine drug screen positive for opiates.  Chest x-ray impression reviewed prominent bronchovascular  markings which may be due to hypo aeration or mild edema.  CT head without contrast impression reviewed demonstrated no acute intracranial abnormality, chronic microvascular ischemic changes.  CT of the chest abdomen and pelvis without contrast impression reviewed demonstrated no definitive acute abnormality of the abdomen or pelvis, distended gallbladder without evidence of calcified stone, distended urinary bladder.  Renal ultrasound impression reviewed demonstrated no acute kidney abnormality.      Initial vital signs /103 pulse 100 respirations 22 temperature 98.1° F O2 saturation 100% on room air.  Patient did have drop in blood pressure with the lowest 78/53 O2 saturation dropped into the low 90s.  Patient was given IV hydration and placed on supplemental oxygen therapy with improvement in his hemodynamics.  Renal Services have been consulted per ED and evaluated the patient.  Patient is admitted to hospital medicine services for further management.    Patient admitted with diagnosis of ARF ? Obstruction pathology. Vicente continued and renal team consulted.     Interval Hx:   Patient today awake and comfortable. Denies any SOB, chest pain fever or chills. He is now oriented x 3. Has a vicente draining clear urine.     Family at bedside, explained in detail about the patients condition, diagnosis, vitals, labs and treatment plan. They understand and agree with the plan. All their questions were answered.      Case was discussed with patient's nurse and  on the floor.    Objective/physical exam:  General: In no acute distress, Obese   Chest: Clear to auscultation bilaterally  Heart: RRR, +S1, S2, no appreciable murmur  Abdomen: Soft, nontender, BS +  MSK: Warm, + lower extremity edema, no clubbing or cyanosis  Neurologic: Alert and oriented x4, Cranial nerve II-XII intact, Strength 5/5 in all 4 extremities    VITAL SIGNS: 24 HRS MIN & MAX LAST   Temp  Min: 97.7 °F (36.5 °C)  Max: 99 °F (37.2 °C)  97.7 °F (36.5 °C)   BP  Min: 78/51  Max: 119/85 114/78   Pulse  Min: 78  Max: 117  104   Resp  Min: 16  Max: 24 18   SpO2  Min: 89 %  Max: 100 % 97 %     I have reviewed the following labs:  Recent Labs   Lab 12/15/24  1052 12/16/24  0148   WBC 11.7  11.70* 10.48  10.48   RBC 3.00* 2.89*   HGB 9.1* 8.7*   HCT 26.5* 25.4*   MCV 88.3 87.9   MCH 30.3 30.1   MCHC 34.3 34.3   RDW 15.2 15.5    164   MPV 8.7 8.7     Recent Labs   Lab 12/15/24  1052 12/15/24  1058 12/15/24  1556 12/15/24  2203 12/16/24 0148   *  --  123* 127* 129*   K 6.1*  --  4.6 5.1 5.3*   CL 87*  --  90* 92* 96*   CO2 20*  --  21* 21* 22   BUN 87.4*  --  84.5* 82.8* 80.1*   CREATININE 4.89*  --  4.50* 3.95* 3.54*   CALCIUM 7.4*  --  7.1* 7.1* 7.5*   PH  --  7.330*  --   --   --    MG 2.20  --   --   --  2.20   ALBUMIN 2.3*  --   --   --  2.1*   ALKPHOS 88  --   --   --  82   ALT 34  --   --   --  32   AST 51*  --   --   --  39*   BILITOT 0.5  --   --   --  0.4     Microbiology Results (last 7 days)       Procedure Component Value Units Date/Time    Blood Culture #1 **CANNOT BE ORDERED STAT** [[id number]]  (Normal) Collected: 12/15/24 1218    Order Status: Completed Specimen: Blood from Antecubital, Right Updated: 12/16/24 1501     Blood Culture No Growth At 24 Hours    Blood Culture #1 **CANNOT BE ORDERED STAT** [[id number]]  (Normal) Collected: 12/15/24 1218    Order Status: Completed Specimen: Blood from Antecubital, Right Updated: 12/16/24 1501     Blood Culture No Growth At 24 Hours             See below for Radiology    Assessment/Plan:  Acute encephalopathy, uremic - resolved   Acute renal failure ? Obstructive pathology   Acute urinary retention-requiring indwelling catheter-POA  Hypovolemic hypotension-POA resolved   Hyperkalemia-suspected secondary to acute renal failure-POA Improved   Anemia-chronic disease-POA  Weakness- POA     Hx of chronic neck and back pain, history of polysubstance abuse, anxiety, depression,  schizoaffective disorder, HLD, HTN, osteoarthritis, JANETH with CPAP, DJD knees, carpal tunnel syndrome, chronic pancreatitis, PTSD, RLS, vertigo    Plan:  Patient looks comfortable. Valdez draining clear urine   Mental status is at baseline.   Has been afebrile  Will continue iv fluids per renal team   US KUB unremarkable   Today BUN 80, Crt 3.54, Na 129, K 5.3, Hb 8.7    Continue supportive care     No NSAIDS     VTE prophylaxis: Heparin sq     Patient condition:  Guarded    Anticipated discharge and Disposition:   Home       All diagnosis and differential diagnosis have been reviewed; assessment and plan has been documented; I have personally reviewed the labs and test results that are presently available; I have reviewed the patients medication list; I have reviewed the consulting providers response and recommendations. I have reviewed or attempted to review medical records based upon their availability    All of the patient's questions have been  addressed and answered. Patient's is agreeable to the above stated plan. I will continue to monitor closely and make adjustments to medical management as needed.    Portions of this note dictated using EMR integrated voice recognition software, and may be subject to voice recognition errors not corrected at proofreading. Please contact writer for clarification if needed.   _____________________________________________________________________    Malnutrition Status:  Nutrition consulted. Most recent weight and BMI monitored-     Measurements:  Wt Readings from Last 1 Encounters:   12/15/24 99.8 kg (220 lb)   Body mass index is 35.51 kg/m².    Patient has been screened and assessed by RD.    Malnutrition Type:  Context:    Level:      Malnutrition Characteristic Summary:       Interventions/Recommendations (treatment strategy):        Scheduled Med:   amitriptyline  50 mg Oral QHS    calcium carbonate  500 mg Oral Daily    citalopram  40 mg Oral Daily AM    divalproex ER   500 mg Oral QHS    ferrous sulfate  1 tablet Oral Daily    fluvoxaMINE  100 mg Oral BID    heparin (porcine)  5,000 Units Subcutaneous Q12H    mupirocin   Nasal BID    OLANZapine  20 mg Oral Nightly    pramipexole  1 mg Oral QHS    QUEtiapine  25 mg Oral QHS    timolol maleate 0.5%  1 drop Both Eyes BID      Continuous Infusions:   0.9% NaCl   Intravenous Continuous 150 mL/hr at 12/15/24 2103 Rate Change at 12/15/24 2103      PRN Meds:    Current Facility-Administered Medications:     acetaminophen, 1,000 mg, Oral, Q6H PRN    acetaminophen, 650 mg, Oral, Q4H PRN    [COMPLETED] calcium gluconate IVPB, 1 g, Intravenous, ED 1 Time **AND** calcium gluconate IVPB, 1 g, Intravenous, Q10 Min PRN    dextrose 10%, 12.5 g, Intravenous, PRN    dextrose 10%, 25 g, Intravenous, PRN    glucagon (human recombinant), 1 mg, Intramuscular, PRN    glucose, 16 g, Oral, PRN    glucose, 24 g, Oral, PRN    LORazepam, 0.5 mg, Oral, Q8H PRN    methocarbamoL, 750 mg, Oral, BID PRN    naloxone, 0.02 mg, Intravenous, PRN    ondansetron, 4 mg, Intravenous, Q4H PRN    prochlorperazine, 5 mg, Intravenous, Q6H PRN    sodium chloride 0.9%, 10 mL, Intravenous, PRN     Radiology:  I have personally reviewed the following imaging and agree with the radiologist.     Echo    Left Ventricle: The left ventricle is mildly dilated. Normal wall   thickness. There is normal systolic function with a visually estimated   ejection fraction of 60 - 65%. Grade II diastolic dysfunction.    Right Ventricle: Mild right ventricular enlargement. Systolic function   is normal. TAPSE is 2.75 cm.    Left Atrium: Left atrium is mildly dilated.    Aortic Valve: Mildly calcified cusps. There is mild stenosis. Aortic   valve peak velocity is 2.1 m/s. Mean gradient is 10.0 mmHg. The   dimensionless index is 0.72.    Mitral Valve: Mildly thickened leaflets.    Tricuspid Valve: There is mild regurgitation.    IVC/SVC: Normal venous pressure at 3 mmHg.    Pericardium: There is a  small effusion.      Kristian Henao MD  Department of Hospital Medicine   [facility]   12/16/2024

## 2024-12-16 NOTE — PROGRESS NOTES
Nephrology progress note            Trey Hancock is a 59 y.o. male with a history of HTN, obesity, PTSD, depression, schizophrenia, JANETH, polysubstance abuse (alcohol, cocaine, benzos, and opiates), and recent history of incisional hernia repair by Dr. Delacruz 11/21/2024.     Brought to the ER today 12/15/2024 by his wife with a chief complaint of altered mental status and pitting edema to bilateral lower legs. His wife is at the bedside, states he recovered very well from the hernia repair and denied any abd pain, n/v. However she does tell me he has been having trouble emptying his bladder for the past few days. A vicente was placed in the ER with immediate return of over 2,000L, after emptying the bag he has produced another 800ml.     Upon admission, he was found to have creatinine of 4.89, BUN 87.4, potassium 6.1, sodium 120, EGFR of 13, Mag 2.2, unremarkable UA, normal troponin, , CPK of 891.  UDS is pending.  Retroperitoneal ultrasound is also pending.  Blood pressure has been low since admission with the lowest recorded reading of 78/53. Currently has 98% O2 saturation on room air. He appears ill and weak, but is oriented to time, place, and person. He has no personal hx of obstructive uropathy, nephrolithiasis, kidney disease. No family hx of renal disease. Wife states he has been compliant with all of his medications. No NSAID use.     12/16/2024   Lying down in the bed.  No new complaints today.  Excellent urine output noted.  No chest pains no abdominal pains no shortness of breath no nausea vomiting.  No fever or chills.  Indwelling Vicente catheter noted and patient does have clear yellow urine.  Wife present.      PCP:  [facility]    Review of patient's allergies indicates:  No Known Allergies    Current Facility-Administered Medications   Medication Dose Route Frequency Provider Last Rate Last Admin    0.9% NaCl infusion   Intravenous Continuous Vianey Joaquin  mL/hr at  12/15/24 2103 Rate Change at 12/15/24 2103    acetaminophen tablet 1,000 mg  1,000 mg Oral Q6H PRN Taco Gomes FNP        acetaminophen tablet 650 mg  650 mg Oral Q4H PRN Taco Gomes, FNP        amitriptyline tablet 50 mg  50 mg Oral QHS Taco Gomes, FNP   50 mg at 12/15/24 2103    calcium carbonate 200 mg calcium (500 mg) chewable tablet 500 mg  500 mg Oral Daily Taco Gomes FNP        calcium gluconate 1 g in NS IVPB (premixed)  1 g Intravenous Q10 Min PRN Minor Soria MD        citalopram tablet 40 mg  40 mg Oral Daily AM Taco Gomes FNP        dextrose 10% bolus 125 mL 125 mL  12.5 g Intravenous PRN Taco Gomes, YEHUDAP        dextrose 10% bolus 250 mL 250 mL  25 g Intravenous PRN Taco Gomes FNP        divalproex ER 24 hr tablet 500 mg  500 mg Oral QHS Taco Gomes, FNP   500 mg at 12/15/24 2103    ferrous sulfate tablet 1 each  1 tablet Oral Daily Taco Gomes FNP        fluvoxaMINE tablet 100 mg  100 mg Oral BID Taco Gomes, FNP   100 mg at 12/15/24 2104    glucagon (human recombinant) injection 1 mg  1 mg Intramuscular PRN Taco Gomes, KAZ        glucose chewable tablet 16 g  16 g Oral PRN Taco Gomes, YEHUDAP        glucose chewable tablet 24 g  24 g Oral PRN Taco Gomes, YEHUDAP        heparin (porcine) injection 5,000 Units  5,000 Units Subcutaneous Q12H Taco Gomes, FNP   5,000 Units at 12/15/24 2104    LORazepam tablet 0.5 mg  0.5 mg Oral Q8H PRN Taco Gomes, KAZ        methocarbamoL tablet 750 mg  750 mg Oral BID PRN Taco Gomes, FNP        mupirocin 2 % ointment   Nasal BID Francisco Javier Ulloa MD   Given at 12/15/24 2104    naloxone 0.4 mg/mL injection 0.02 mg  0.02 mg Intravenous PRN Taco Gomes FNP        OLANZapine tablet 20 mg  20 mg Oral Nightly Taco Gomes FNP   20 mg at 12/15/24 2103    ondansetron  injection 4 mg  4 mg Intravenous Q4H PRN Taco Gomes FNP        pramipexole tablet 1 mg  1 mg Oral QHS Taco Gomes FNP   1 mg at 12/15/24 2103    prochlorperazine injection Soln 5 mg  5 mg Intravenous Q6H PRN Taco Gomes FNP        QUEtiapine tablet 25 mg  25 mg Oral QHS Taco Gomes FNP   25 mg at 12/15/24 2103    sodium chloride 0.9% flush 10 mL  10 mL Intravenous PRN Taco Gomes FNP        timolol maleate 0.5% ophthalmic solution 1 drop  1 drop Both Eyes BID Taco Gomes FNP   1 drop at 12/15/24 2104     Medications Prior to Admission   Medication Sig Dispense Refill Last Dose/Taking    amitriptyline (ELAVIL) 50 MG tablet Take 50 mg by mouth every evening.   12/14/2024    ASCORBIC ACID, BULK, MISC Take 1,000 mg by mouth every evening.   12/14/2024    busPIRone (BUSPAR) 15 MG tablet Take 15 mg by mouth 2 (two) times daily.   12/15/2024    calcium carbonate 260 mg calcium (650 mg) Chew Take 2 tablets by mouth Daily.   12/15/2024    carboxymethylcellulose (REFRESH PLUS) 0.5 % Dpet Place 1 drop into both eyes 3 (three) times daily as needed (dry eye).   12/14/2024    citalopram (CELEXA) 40 MG tablet Take 40 mg by mouth once daily.   12/15/2024    clonazePAM (KLONOPIN) 2 MG Tab Take 2 mg by mouth 2 (two) times daily as needed.   12/14/2024    cloNIDine (CATAPRES) 0.1 MG tablet Take 1 tablet by mouth once daily.   12/15/2024    divalproex ER (DEPAKOTE ER) 500 MG Tb24 24 hr tablet Take 500 mg by mouth every evening.   12/14/2024    ferrous gluconate (FERGON) 324 MG tablet Take 324 mg by mouth every evening.   12/14/2024    fluticasone propionate (FLONASE) 50 mcg/actuation nasal spray Flonase Allergy Relief Take 1 spray(s) (nasal) 2 times per day for 7 days [id number] spray,suspension 2 times per day nasal 7 days active 50 mcg/actuation   12/15/2024    fluvoxaMINE (LUVOX) 100 MG tablet Take 100 mg by mouth 2 (two) times a day.   12/15/2024    furosemide  (LASIX) 40 MG tablet Take 40 mg by mouth once daily.   12/15/2024    gabapentin (NEURONTIN) 300 MG capsule Take 300 mg by mouth 3 (three) times daily.   12/15/2024    hydroCHLOROthiazide (MICROZIDE) 12.5 mg capsule Take 12.5 mg by mouth once daily.   12/15/2024    lisinopriL (PRINIVIL,ZESTRIL) 40 MG tablet Take 20 mg by mouth once daily.   12/15/2024    LORazepam (ATIVAN) 1 MG tablet Take 0.5 mg by mouth every 8 (eight) hours as needed for Anxiety.   12/14/2024    meclizine (ANTIVERT) 12.5 mg tablet Take 12.5 mg by mouth 2 (two) times daily as needed for Dizziness.   12/15/2024    methocarbamoL (ROBAXIN) 750 MG Tab Take 750 mg by mouth 2 (two) times daily as needed (pain).   12/15/2024    metoprolol tartrate (LOPRESSOR) 50 MG tablet Take 25 mg by mouth 2 (two) times daily.   12/15/2024    multivitamin (THERAGRAN) per tablet Take 1 tablet by mouth once daily.   12/15/2024    OLANZapine (ZYPREXA) 20 MG tablet Take 20 mg by mouth nightly.   12/14/2024    potassium chloride SA (K-DUR,KLOR-CON) 20 MEQ tablet Take 20 mEq by mouth once daily.   12/15/2024    pramipexole (MIRAPEX) 1 MG tablet Take 2 mg by mouth every evening.   12/14/2024    QUEtiapine (SEROQUEL) 25 MG Tab Take 25 mg by mouth every evening.   12/14/2024    senna-docusate 8.6-50 mg (PERICOLACE) 8.6-50 mg per tablet Take 2 tablets by mouth 2 (two) times a day.   12/15/2024    simvastatin (ZOCOR) 40 MG tablet Take 20 mg by mouth once daily.   12/15/2024    timoloL (BETIMOL) 0.5 % ophthalmic solution Place 1 drop into both eyes 2 (two) times daily.   12/15/2024    trihexyphenidyL (ARTANE) 2 MG tablet Take 2 mg by mouth 2 (two) times daily with meals.   12/15/2024    vitamin D (VITAMIN D3) 1000 units Tab Take 1,000 Units by mouth once daily.   12/15/2024    acetaminophen (TYLENOL) 325 MG tablet Take 2 tablets by mouth every 6 (six) hours as needed for Pain.       HYDROcodone-acetaminophen (NORCO) 5-325 mg per tablet Take 1 tablet by mouth every 6 (six) hours as  needed for Pain. 10 tablet 0     METOPROLOL SUCCINATE ORAL metoprolol succinate Take No date recorded No form recorded No frequency recorded No route recorded No set duration recorded No set duration amount recorded active No dosage strength recorded No dosage strength units of measure recorded   Unknown       Past Medical History:  Past Medical History:   Diagnosis Date    Abdominal pain     Hernia pain    Anemia     Anxiety     Arthritis of knee, right     Bowel obstruction     Carpal tunnel syndrome on both sides     Chronic pancreatitis     Constipation     Depression     Fatigue     HLD (hyperlipidemia)     Hypertension     Incisional hernia without obstruction or gangrene     Insomnia     Low back pain     Lumbar degenerative disc disease     Neuropathy     Obesity     Osteoarthritis     PTSD (post-traumatic stress disorder)     Restless leg     Right hip pain     Right knee pain     Schizophrenia     Sleep apnea     CPAP    Use of cane as ambulatory aid     Uses walker     Vertigo     Weakness        Past Surgical History:  Past Surgical History:   Procedure Laterality Date    abdominal wall hematoma drainage  2010    p bowel obstruction surgery    COLONOSCOPY      ESOPHAGOGASTRODUODENOSCOPY      EXPLORATORY LAPAROTOMY  2010    bowel obstruction    FINGER SURGERY Left     pinky repair after work injury    HIP REPLACEMENT ARTHROPLASTY Right     LAPAROSCOPIC NISSEN FUNDOPLICATION      ROBOT-ASSISTED LAPAROSCOPIC REPAIR OF INCISIONAL HERNIA N/A 11/21/2024    Procedure: ROBOTIC REPAIR, HERNIA, INCISIONAL;  Surgeon: Jose Delacruz MD;  Location: [facility];  Service: Oncology;  Laterality: N/A;  INCISIONAL HERNIA WITH MESH    ROTATOR CUFF REPAIR Left     VARICOSE VEIN SURGERY Left     leg       Family History:  No family history on file.    Social History:  Social History     Tobacco Use    Smoking status: Never    Smokeless tobacco: Never   Substance Use Topics    Alcohol use: Not Currently          Review of  Systems:    Review of Systems   Constitutional:  Negative for appetite change, fever and unexpected weight change.   Respiratory:  Negative for cough and shortness of breath.    Cardiovascular:  Negative for chest pain and leg swelling.   Gastrointestinal:  Negative for abdominal distention, abdominal pain, anal bleeding, blood in stool, constipation, diarrhea, nausea and vomiting.   Genitourinary:  Negative for decreased urine volume, dysuria, enuresis and hematuria.   Musculoskeletal:  Negative for back pain and myalgias.   Skin:  Negative for color change and pallor.   Neurological:  Negative for speech difficulty.   All other systems reviewed and are negative.      Objective:    VITAL SIGNS: 24 HR MIN & MAX LAST  Temp  Min: 97.8 °F (36.6 °C)  Max: 99 °F (37.2 °C) 97.8 °F (36.6 °C)  BP  Min: 78/51  Max: 168/103 119/85  Pulse  Min: 78  Max: 117 108  Resp  Min: 15  Max: 24 18  SpO2  Min: 89 %  Max: 100 % 95 %      Intake/Output Summary (Last 24 hours) at 12/16/2024 0826  Last data filed at 12/16/2024 0659  Gross per 24 hour   Intake 1300 ml   Output 8450 ml   Net -7150 ml       Physical Exam  Vitals and nursing note reviewed.   Constitutional:       General: He is not in acute distress.  HENT:      Head: Normocephalic and atraumatic.      Mouth/Throat:      Mouth: Mucous membranes are moist.   Eyes:      General: No scleral icterus.     Extraocular Movements: Extraocular movements intact.      Pupils: Pupils are equal, round, and reactive to light.   Cardiovascular:      Rate and Rhythm: Normal rate and regular rhythm.      Pulses: Normal pulses.      Heart sounds: Normal heart sounds.   Pulmonary:      Effort: Pulmonary effort is normal. No respiratory distress.      Breath sounds: Normal breath sounds.   Abdominal:      General: Bowel sounds are normal. There is no distension.      Palpations: Abdomen is soft.      Tenderness: There is no abdominal tenderness.      Comments: Protuberant but benign abd. Palpable,  nontender hernia with surgical incision   Musculoskeletal:         General: No swelling or deformity. Normal range of motion.      Right lower leg: No edema.      Left lower leg: No edema.   Skin:     General: Skin is warm and dry.   Neurological:      General: No focal deficit present.      Mental Status: He is alert and oriented to person, place, and time.      Comments: Awake, oriented x 3.    Psychiatric:         Mood and Affect: Mood normal.         Behavior: Behavior normal.       Dialysis Access: none    Recent Results (from the past 48 hours)   EKG 12-lead    Collection Time: 12/15/24 10:33 AM   Result Value Ref Range    QRS Duration 106 ms    [facility] QTC Calculation 442 ms   Comprehensive metabolic panel    Collection Time: 12/15/24 10:52 AM   Result Value Ref Range    Sodium 120 (L) 136 - 145 mmol/L    Potassium 6.1 (H) 3.5 - 5.1 mmol/L    Chloride 87 (L) 98 - 107 mmol/L    CO2 20 (L) 22 - 29 mmol/L    Glucose 65 (L) 74 - 100 mg/dL    Blood Urea Nitrogen 87.4 (H) 8.4 - 25.7 mg/dL    Creatinine 4.89 (H) 0.72 - 1.25 mg/dL    Calcium 7.4 (L) 8.4 - 10.2 mg/dL    Protein Total 6.1 (L) 6.4 - 8.3 gm/dL    Albumin 2.3 (L) 3.5 - 5.0 g/dL    Globulin 3.8 (H) 2.4 - 3.5 gm/dL    Albumin/Globulin Ratio 0.6 (L) 1.1 - 2.0 ratio    Bilirubin Total 0.5 <=1.5 mg/dL    ALP 88 40 - 150 unit/L    ALT 34 0 - 55 unit/L    AST 51 (H) 5 - 34 unit/L    eGFR 13 mL/min/1.73/m2    Anion Gap 13.0 mEq/L    BUN/Creatinine Ratio 18    Troponin I    Collection Time: 12/15/24 10:52 AM   Result Value Ref Range    Troponin-I 0.031 0.000 - 0.045 ng/mL   Magnesium    Collection Time: 12/15/24 10:52 AM   Result Value Ref Range    Magnesium Level 2.20 1.60 - 2.60 mg/dL   Protime-INR    Collection Time: 12/15/24 10:52 AM   Result Value Ref Range    PT 15.7 (H) 12.5 - 14.5 seconds    INR 1.3 <=1.3   Brain natriuretic peptide    Collection Time: 12/15/24 10:52 AM   Result Value Ref Range    Natriuretic Peptide 456.9 (H) <=100.0 pg/mL   CBC with  Differential    Collection Time: 12/15/24 10:52 AM   Result Value Ref Range    WBC 11.70 (H) 4.50 - 11.50 x10(3)/mcL    RBC 3.00 (L) 4.70 - 6.10 x10(6)/mcL    Hgb 9.1 (L) 14.0 - 18.0 g/dL    Hct 26.5 (L) 42.0 - 52.0 %    MCV 88.3 80.0 - 94.0 fL    MCH 30.3 27.0 - 31.0 pg    MCHC 34.3 33.0 - 36.0 g/dL    RDW 15.2 11.5 - 17.0 %    Platelet 173 130 - 400 x10(3)/mcL    MPV 8.7 7.4 - 10.4 fL    NRBC% 0.0 %   Manual Differential    Collection Time: 12/15/24 10:52 AM   Result Value Ref Range    WBC 11.7 x10(3)/mcL    Neutrophils % 78 %    Lymphs % 6 %    Monocytes % 10 %    Basophils % 1 %    Metamyelocytes % 4 (H) <=0 %    Myelocytes % 2 (H) <=0 %    Neutrophils Abs 9.126 2.1 - 9.2 x10(3)/mcL    Lymphs Abs 0.702 0.6 - 4.6 x10(3)/mcL    Monocytes Abs 1.17 0.1 - 1.3 x10(3)/mcL    Basophils Abs 0.117 0 - 0.2 x10(3)/mcL    Platelets Normal Normal, Adequate    RBC Morph Abnormal (A) Normal    Polychromasia Slight (A) (none)    Poikilocytosis 1+ (A) (none)    Anisocytosis 1+ (A) (none)    Jaylen Cells 1+ (A) (none)   CPK    Collection Time: 12/15/24 10:52 AM   Result Value Ref Range    Creatine Kinase 891 (H) 30 - 200 U/L   RT Blood Gas    Collection Time: 12/15/24 10:58 AM   Result Value Ref Range    Sample Type Arterial Blood     Sample site Right Radial Artery     Drawn by KT RRT     pH, Blood gas 7.330 (L) 7.350 - 7.450    pCO2, Blood gas 41.0 35.0 - 45.0 mmHg    pO2, Blood gas 71.0 (L) 80.0 - 100.0 mmHg    Sodium, Blood Gas 115 (LL) 137 - 145 mmol/L    Potassium, Blood Gas 6.0 (H) 3.5 - 5.0 mmol/L    Calcium Level Ionized 0.89 (L) 1.12 - 1.23 mmol/L    TOC2, Blood gas 22.9 mmol/L    Base Excess, Blood gas -4.10 (L) -2.00 - 2.00 mmol/L    sO2, Blood gas 92.7 %    HCO3, Blood gas 21.6 (L) 22.0 - 26.0 mmol/L    THb, Blood gas 9.2 (L) 12 - 16 g/dL    O2 Hb, Blood Gas 93.0 (L) 94.0 - 97.0 %    CO Hgb 2.1 (H) 0.5 - 1.5 %    Met Hgb 0.6 0.4 - 1.5 %    Oxygen Device, Blood gas RA    Lactic acid, plasma    Collection Time: 12/15/24  12:18 PM   Result Value Ref Range    Lactic Acid Level 0.6 0.5 - 2.2 mmol/L   POCT glucose    Collection Time: 12/15/24  1:02 PM   Result Value Ref Range    POCT Glucose 70 70 - 110 mg/dL   Urinalysis, Reflex to Urine Culture    Collection Time: 12/15/24  1:09 PM    Specimen: Urine, Catheterized   Result Value Ref Range    Color, UA Yellow Yellow, Light-Yellow, Colorless, Straw, Dark-Yellow    Appearance, UA Clear Clear    Specific Gravity, UA 1.008 1.005 - 1.030    pH, UA 5.0 5.0 - 8.5    Protein, UA Negative Negative    Glucose, UA Normal Negative, Normal    Ketones, UA Negative Negative    Blood, UA Trace (A) Negative    Bilirubin, UA Negative Negative    Urobilinogen, UA Normal 0.2, 1.0, Normal    Nitrites, UA Negative Negative    Leukocyte Esterase, UA Negative Negative    RBC, UA 0-5 None Seen, 0-2, 3-5, 0-5 /HPF    WBC, UA 0-5 None Seen, 0-2, 3-5, 0-5 /HPF    Bacteria, UA Trace None Seen, Trace /HPF    Squamous Epithelial Cells, UA Trace None Seen, Trace, Rare /HPF    Mucous, UA Trace (A) None Seen /LPF   Drug Screen, Urine    Collection Time: 12/15/24  1:09 PM   Result Value Ref Range    Amphetamines, Urine Negative Negative    Barbiturates, Urine Negative Negative    Benzodiazepine, Urine Negative Negative    Cannabinoids, Urine Negative Negative    Cocaine, Urine Negative Negative    Fentanyl, Urine Negative Negative    MDMA, Urine Negative Negative    Opiates, Urine Positive (A) Negative    Phencyclidine, Urine Negative Negative    pH, Urine 5.0 3.0 - 11.0    Specific Gravity, Urine Auto 1.008 1.001 - 1.035   POCT glucose    Collection Time: 12/15/24  2:48 PM   Result Value Ref Range    POCT Glucose 123 (H) 70 - 110 mg/dL   Sodium, Random Urine    Collection Time: 12/15/24  3:33 PM   Result Value Ref Range    Urine Sodium 37.0 mmol/L   Protein, Random Urine    Collection Time: 12/15/24  3:33 PM   Result Value Ref Range    Urine Protein Level 9.8 mg/dL   Creatinine, Random Urine    Collection Time:  12/15/24  3:33 PM   Result Value Ref Range    Urine Creatinine 32.8 (L) 63.0 - 166.0 mg/dL   Osmolality, Urine    Collection Time: 12/15/24  3:33 PM   Result Value Ref Range    Urine Osmolality 196 (L) 300 - 1,300 mOsm/kg   Basic metabolic panel    Collection Time: 12/15/24  3:56 PM   Result Value Ref Range    Sodium 123 (L) 136 - 145 mmol/L    Potassium 4.6 3.5 - 5.1 mmol/L    Chloride 90 (L) 98 - 107 mmol/L    CO2 21 (L) 22 - 29 mmol/L    Glucose 94 74 - 100 mg/dL    Blood Urea Nitrogen 84.5 (H) 8.4 - 25.7 mg/dL    Creatinine 4.50 (H) 0.72 - 1.25 mg/dL    BUN/Creatinine Ratio 19     Calcium 7.1 (L) 8.4 - 10.2 mg/dL    Anion Gap 12.0 mEq/L    eGFR 14 mL/min/1.73/m2   TSH    Collection Time: 12/15/24  3:56 PM   Result Value Ref Range    TSH 0.623 0.350 - 4.940 uIU/mL   Basic Metabolic Panel    Collection Time: 12/15/24 10:03 PM   Result Value Ref Range    Sodium 127 (L) 136 - 145 mmol/L    Potassium 5.1 3.5 - 5.1 mmol/L    Chloride 92 (L) 98 - 107 mmol/L    CO2 21 (L) 22 - 29 mmol/L    Glucose 76 74 - 100 mg/dL    Blood Urea Nitrogen 82.8 (H) 8.4 - 25.7 mg/dL    Creatinine 3.95 (H) 0.72 - 1.25 mg/dL    BUN/Creatinine Ratio 21     Calcium 7.1 (L) 8.4 - 10.2 mg/dL    Anion Gap 14.0 mEq/L    eGFR 17 mL/min/1.73/m2   Lactic Acid, Plasma    Collection Time: 12/15/24 10:31 PM   Result Value Ref Range    Lactic Acid Level 0.5 0.5 - 2.2 mmol/L   Comprehensive Metabolic Panel    Collection Time: 12/16/24  1:48 AM   Result Value Ref Range    Sodium 129 (L) 136 - 145 mmol/L    Potassium 5.3 (H) 3.5 - 5.1 mmol/L    Chloride 96 (L) 98 - 107 mmol/L    CO2 22 22 - 29 mmol/L    Glucose 71 (L) 74 - 100 mg/dL    Blood Urea Nitrogen 80.1 (H) 8.4 - 25.7 mg/dL    Creatinine 3.54 (H) 0.72 - 1.25 mg/dL    Calcium 7.5 (L) 8.4 - 10.2 mg/dL    Protein Total 5.8 (L) 6.4 - 8.3 gm/dL    Albumin 2.1 (L) 3.5 - 5.0 g/dL    Globulin 3.7 (H) 2.4 - 3.5 gm/dL    Albumin/Globulin Ratio 0.6 (L) 1.1 - 2.0 ratio    Bilirubin Total 0.4 <=1.5 mg/dL    ALP  82 40 - 150 unit/L    ALT 32 0 - 55 unit/L    AST 39 (H) 5 - 34 unit/L    eGFR 19 mL/min/1.73/m2    Anion Gap 11.0 mEq/L    BUN/Creatinine Ratio 23    Magnesium    Collection Time: 12/16/24  1:48 AM   Result Value Ref Range    Magnesium Level 2.20 1.60 - 2.60 mg/dL   Cortisol    Collection Time: 12/16/24  1:48 AM   Result Value Ref Range    Cortisol 6.20 ug/dL   CBC with Differential    Collection Time: 12/16/24  1:48 AM   Result Value Ref Range    WBC 10.48 4.50 - 11.50 x10(3)/mcL    RBC 2.89 (L) 4.70 - 6.10 x10(6)/mcL    Hgb 8.7 (L) 14.0 - 18.0 g/dL    Hct 25.4 (L) 42.0 - 52.0 %    MCV 87.9 80.0 - 94.0 fL    MCH 30.1 27.0 - 31.0 pg    MCHC 34.3 33.0 - 36.0 g/dL    RDW 15.5 11.5 - 17.0 %    Platelet 164 130 - 400 x10(3)/mcL    MPV 8.7 7.4 - 10.4 fL    NRBC% 0.0 %   Manual Differential    Collection Time: 12/16/24  1:48 AM   Result Value Ref Range    WBC 10.48 x10(3)/mcL    Neutrophils % 86 %    Lymphs % 4 %    Monocytes % 5 %    Eosinophils % 3 %    Metamyelocytes % 2 (H) <=0 %    Neutrophils Abs 9.0128 2.1 - 9.2 x10(3)/mcL    Lymphs Abs 0.4192 (L) 0.6 - 4.6 x10(3)/mcL    Monocytes Abs 0.524 0.1 - 1.3 x10(3)/mcL    Eosinophils Abs 0.3144 0 - 0.9 x10(3)/mcL    Platelets Normal Normal, Adequate    RBC Morph Abnormal (A) Normal    Poikilocytosis 1+ (A) (none)    Macrocytosis 1+ (A) (none)    Street Cells 1+ (A) (none)       CT Chest Abdomen Pelvis Without Contrast (XPD)    Result Date: 12/15/2024  EXAMINATION: CT CHEST ABDOMEN PELVIS WITHOUT CONTRAST(XPD) CLINICAL HISTORY: Sepsis;Acute renal failure, sob, AMS; TECHNIQUE: CT of the chest, abdomen and pelvis without intravenous contrast. Coronal and sagittal reconstructions were obtained from the axial data set. Automatic exposure control was utilized to limit radiation dose. Radiation Dose: Total DLP: 16 17 mGy*cm COMPARISON: None FINDINGS: LINES/TUBES: None. AIRWAYS: Clear centrally. LUNGS: Clear. PLEURA: No pleural effusions. No pneumothoraces. HEART AND MEDIASTINUM:  The heart is mildly enlarged.  There is no pericardial effusion.  There are small calcified mediastinal and lymph nodes. SOFT TISSUES: Normal. THORACIC BONES: No acute bony pathology. ABDOMEN/PELVIS: HEPATOBILIARY: The liver is unremarkable.  The gallbladder is distended. SPLEEN: Unremarkable PANCREAS: No focal masses or ductal dilatation. ADRENALS: No adrenal nodules. KIDNEYS/URETERS: No hydronephrosis. PELVIC ORGANS/BLADDER: Over distended urinary bladder. PERITONEUM/RETROPERITONEUM: No free intraperitoneal air. No free fluid. LYMPH NODES: No lymphadenopathy. VESSELS: Mild scattered vascular calcifications. GI TRACT: Moderate colonic stool volume.  No bowel obstruction. ABDOMINOPELVIC BONES AND SOFT TISSUE: Small amount of fluid in the anterior abdominal wall..  No acute bony pathology.     1. No definite acute abnormality of the abdomen or pelvis. 2. Distended gallbladder without evidence of calcified stone. 3. Distended urinary bladder. Electronically signed by: Suni Mayes Date:    12/15/2024 Time:    12:23      Assessment & Plan:    TREVON - secondary to obstructive uropathy.    Obstructive uropathy - continue vicente, I's and O's   AMS - resolved  Hypotension - improve with IV fluids.  Hyperkalemia   Hyponatremia - start normal saline  Acidosis - closely monitor     Recommendations:  Continue present IV fluids  Patient encouraged increased oral fluid intake  Will continue to monitor patient's electrolytes urine output and renal functions.  Patient is also advised to move around and get out of the bed and mobilize himself.  Check labs tomorrow

## 2024-12-16 NOTE — AI DETERIORATION ALERT
Artificial Intelligence Notification  [facility]  [address] [address]  Phone: [phone]    This documentation was triggered by an Artificial Intelligence Notification:    Admit Date: 12/15/2024   LOS: 0  Code Status: Full Code  : 1965  Age: 59 y.o.  Weight:   Wt Readings from Last 1 Encounters:   12/15/24 99.8 kg (220 lb)        Sex: male  Bed: [address] [place] [address] A  MRN: [id number]  Attending Physician: Francisco Javier Ulloa MD     Date of Alert: 12/15/2024  Time AI Alert Received:             Vitals:    12/15/24 2120   BP: (!) 92/56   Pulse: (!) 112   Resp:    Temp:      SpO2: (!) 92 %      Artificial Intelligence alert discussed with Provider:     Name: Sofi rahman NP   Date/Time of Provider Notification:       Patient Condition: assessed pt at bedside. BP borderline low (see chart) and tachycardic with irregular pulse on my assessment. Ordered stat EKG which read ST with PVCs and t wave abnormality, previous was NSR. Pt has no complaints. Spoke with hospitalist NP and she ordered stat BMP and 500ml bolus after calling Dr. Brar. Rechecked BP and now 81/49 MAP 60, NP aware, bolus in progress. Instructed nurse to recheck BP in 20 mins to assure response to fluid bolus, if BP still low, notify hospitalist. Will keep an eye out for lab results and BPs

## 2024-12-17 LAB
ABS NEUT (OLG): 8.95 X10(3)/MCL (ref 2.1–9.2)
ALBUMIN SERPL-MCNC: 2 G/DL (ref 3.5–5)
ALBUMIN/GLOB SERPL: 0.6 RATIO (ref 1.1–2)
ALP SERPL-CCNC: 111 UNIT/L (ref 40–150)
ALT SERPL-CCNC: 26 UNIT/L (ref 0–55)
ANION GAP SERPL CALC-SCNC: 5 MEQ/L
AST SERPL-CCNC: 25 UNIT/L (ref 5–34)
BILIRUB SERPL-MCNC: 0.4 MG/DL
BUN SERPL-MCNC: 50.9 MG/DL (ref 8.4–25.7)
CALCIUM SERPL-MCNC: 7.7 MG/DL (ref 8.4–10.2)
CHLORIDE SERPL-SCNC: 110 MMOL/L (ref 98–107)
CO2 SERPL-SCNC: 24 MMOL/L (ref 22–29)
CREAT SERPL-MCNC: 1.75 MG/DL (ref 0.72–1.25)
CREAT/UREA NIT SERPL: 29
EOSINOPHIL NFR BLD MANUAL: 0.24 X10(3)/MCL (ref 0–0.9)
EOSINOPHIL NFR BLD MANUAL: 2 %
ERYTHROCYTE [DISTWIDTH] IN BLOOD BY AUTOMATED COUNT: 15.7 % (ref 11.5–17)
GFR SERPLBLD CREATININE-BSD FMLA CKD-EPI: 45 ML/MIN/1.73/M2
GLOBULIN SER-MCNC: 3.3 GM/DL (ref 2.4–3.5)
GLUCOSE SERPL-MCNC: 99 MG/DL (ref 74–100)
HCT VFR BLD AUTO: 29 % (ref 42–52)
HGB BLD-MCNC: 9.5 G/DL (ref 14–18)
INSTRUMENT WBC (OLG): 12.1 X10(3)/MCL
LYMPHOCYTES NFR BLD MANUAL: 1.09 X10(3)/MCL
LYMPHOCYTES NFR BLD MANUAL: 9 %
MCH RBC QN AUTO: 29.2 PG (ref 27–31)
MCHC RBC AUTO-ENTMCNC: 32.8 G/DL (ref 33–36)
MCV RBC AUTO: 89.2 FL (ref 80–94)
MONOCYTES NFR BLD MANUAL: 1.81 X10(3)/MCL (ref 0.1–1.3)
MONOCYTES NFR BLD MANUAL: 15 %
MYELOCYTES NFR BLD MANUAL: 1 %
NEUTROPHILS NFR BLD MANUAL: 74 %
NRBC BLD AUTO-RTO: 0 %
OHS QRS DURATION: 102 MS
OHS QTC CALCULATION: 448 MS
PLATELET # BLD AUTO: 181 X10(3)/MCL (ref 130–400)
PLATELET # BLD EST: NORMAL 10*3/UL
PMV BLD AUTO: 9 FL (ref 7.4–10.4)
POCT GLUCOSE: 91 MG/DL (ref 70–110)
POIKILOCYTOSIS BLD QL SMEAR: ABNORMAL
POTASSIUM SERPL-SCNC: 5 MMOL/L (ref 3.5–5.1)
PROT SERPL-MCNC: 5.3 GM/DL (ref 6.4–8.3)
RBC # BLD AUTO: 3.25 X10(6)/MCL (ref 4.7–6.1)
RBC MORPH BLD: ABNORMAL
SODIUM SERPL-SCNC: 139 MMOL/L (ref 136–145)
TARGETS BLD QL SMEAR: ABNORMAL
WBC # BLD AUTO: 12.1 X10(3)/MCL (ref 4.5–11.5)

## 2024-12-17 PROCEDURE — 25000003 PHARM REV CODE 250: Performed by: INTERNAL MEDICINE

## 2024-12-17 PROCEDURE — 85025 COMPLETE CBC W/AUTO DIFF WBC: CPT | Performed by: INTERNAL MEDICINE

## 2024-12-17 PROCEDURE — 94760 N-INVAS EAR/PLS OXIMETRY 1: CPT

## 2024-12-17 PROCEDURE — 27000221 HC OXYGEN, UP TO 24 HOURS

## 2024-12-17 PROCEDURE — 63600175 PHARM REV CODE 636 W HCPCS: Performed by: NURSE PRACTITIONER

## 2024-12-17 PROCEDURE — 25000003 PHARM REV CODE 250: Performed by: PHYSICIAN ASSISTANT

## 2024-12-17 PROCEDURE — 99900035 HC TECH TIME PER 15 MIN (STAT)

## 2024-12-17 PROCEDURE — 25000003 PHARM REV CODE 250: Performed by: NURSE PRACTITIONER

## 2024-12-17 PROCEDURE — 80053 COMPREHEN METABOLIC PANEL: CPT | Performed by: INTERNAL MEDICINE

## 2024-12-17 PROCEDURE — 21400001 HC TELEMETRY ROOM

## 2024-12-17 PROCEDURE — 36415 COLL VENOUS BLD VENIPUNCTURE: CPT | Performed by: INTERNAL MEDICINE

## 2024-12-17 RX ADMIN — MUPIROCIN: 20 OINTMENT TOPICAL at 09:12

## 2024-12-17 RX ADMIN — HEPARIN SODIUM 5000 UNITS: 5000 INJECTION, SOLUTION INTRAVENOUS; SUBCUTANEOUS at 09:12

## 2024-12-17 RX ADMIN — LORAZEPAM 0.5 MG: 0.5 TABLET ORAL at 09:12

## 2024-12-17 RX ADMIN — OLANZAPINE 20 MG: 5 TABLET, FILM COATED ORAL at 09:12

## 2024-12-17 RX ADMIN — FLUVOXAMINE MALEATE 100 MG: 50 TABLET ORAL at 09:12

## 2024-12-17 RX ADMIN — QUETIAPINE FUMARATE 25 MG: 25 TABLET ORAL at 09:12

## 2024-12-17 RX ADMIN — CALCIUM CARBONATE 500 MG: 500 TABLET, CHEWABLE ORAL at 04:12

## 2024-12-17 RX ADMIN — TIMOLOL MALEATE 1 DROP: 5 SOLUTION OPHTHALMIC at 09:12

## 2024-12-17 RX ADMIN — DIVALPROEX SODIUM 500 MG: 250 TABLET, EXTENDED RELEASE ORAL at 09:12

## 2024-12-17 RX ADMIN — PRAMIPEXOLE DIHYDROCHLORIDE 1 MG: 0.25 TABLET ORAL at 09:12

## 2024-12-17 RX ADMIN — AMITRIPTYLINE HYDROCHLORIDE 50 MG: 50 TABLET, FILM COATED ORAL at 09:12

## 2024-12-17 RX ADMIN — CITALOPRAM HYDROBROMIDE 40 MG: 20 TABLET ORAL at 09:12

## 2024-12-17 RX ADMIN — SODIUM CHLORIDE: 9 INJECTION, SOLUTION INTRAVENOUS at 01:12

## 2024-12-17 RX ADMIN — FERROUS SULFATE TAB 325 MG (65 MG ELEMENTAL FE) 1 EACH: 325 (65 FE) TAB at 09:12

## 2024-12-17 NOTE — PLAN OF CARE
Problem: Infection  Goal: Absence of Infection Signs and Symptoms  Outcome: Progressing     Problem: Adult Inpatient Plan of Care  Goal: Plan of Care Review  Outcome: Progressing  Goal: Patient-Specific Goal (Individualized)  Outcome: Progressing  Goal: Absence of Hospital-Acquired Illness or Injury  Outcome: Progressing  Goal: Optimal Comfort and Wellbeing  Outcome: Progressing  Goal: Readiness for Transition of Care  Outcome: Progressing     Problem: Wound  Goal: Optimal Coping  Outcome: Progressing  Goal: Optimal Functional Ability  Outcome: Progressing  Goal: Absence of Infection Signs and Symptoms  Outcome: Progressing  Goal: Improved Oral Intake  Outcome: Progressing  Goal: Optimal Pain Control and Function  Outcome: Progressing  Goal: Skin Health and Integrity  Outcome: Progressing  Goal: Optimal Wound Healing  Outcome: Progressing     Problem: Acute Kidney Injury/Impairment  Goal: Fluid and Electrolyte Balance  Outcome: Progressing  Goal: Improved Oral Intake  Outcome: Progressing  Goal: Effective Renal Function  Outcome: Progressing

## 2024-12-17 NOTE — PROGRESS NOTES
Nephrology progress note            Trey Hancock is a 59 y.o. male with a history of HTN, obesity, PTSD, depression, schizophrenia, JANETH, polysubstance abuse (alcohol, cocaine, benzos, and opiates), and recent history of incisional hernia repair by Dr. Delacruz 11/21/2024.     Brought to the ER today 12/15/2024 by his wife with a chief complaint of altered mental status and pitting edema to bilateral lower legs. His wife is at the bedside, states he recovered very well from the hernia repair and denied any abd pain, n/v. However she does tell me he has been having trouble emptying his bladder for the past few days. A vicente was placed in the ER with immediate return of over 2,000L, after emptying the bag he has produced another 800ml.     Upon admission, he was found to have creatinine of 4.89, BUN 87.4, potassium 6.1, sodium 120, EGFR of 13, Mag 2.2, unremarkable UA, normal troponin, , CPK of 891.  UDS is pending.  Retroperitoneal ultrasound is also pending.  Blood pressure has been low since admission with the lowest recorded reading of 78/53. Currently has 98% O2 saturation on room air. He appears ill and weak, but is oriented to time, place, and person. He has no personal hx of obstructive uropathy, nephrolithiasis, kidney disease. No family hx of renal disease. Wife states he has been compliant with all of his medications. No NSAID use.     12/16/2024   Lying down in the bed.  No new complaints today.  Excellent urine output noted.  No chest pains no abdominal pains no shortness of breath no nausea vomiting.  No fever or chills.  Indwelling Vicente catheter noted and patient does have clear yellow urine.  Wife present.      12/17/2024   Lying down in the bed.  No acute distress.  No new complaints.  No nausea no vomiting no shortness of breath.  No chest pain no abdominal pains.  Excellent urine output noted.  Improving kidney functions noted.  Wife present in the room.  [facility]    Review of patient's allergies indicates:  No Known Allergies    Current Facility-Administered Medications   Medication Dose Route Frequency Provider Last Rate Last Admin    0.9% NaCl infusion   Intravenous Continuous Rom Pierce  mL/hr at 12/17/24 0137 New Bag at 12/17/24 0137    acetaminophen tablet 1,000 mg  1,000 mg Oral Q6H PRN Taco Gomes, FNP        acetaminophen tablet 650 mg  650 mg Oral Q4H PRN Taco Gomes, FNP        amitriptyline tablet 50 mg  50 mg Oral QHS Taco Gomes, FNP   50 mg at 12/16/24 2012    calcium carbonate 200 mg calcium (500 mg) chewable tablet 500 mg  500 mg Oral Daily Taco Gomes FNP   500 mg at 12/16/24 1811    calcium gluconate 1 g in NS IVPB (premixed)  1 g Intravenous Q10 Min PRN Minor Soria MD        citalopram tablet 40 mg  40 mg Oral Daily AM Taco Gomes FNP   40 mg at 12/17/24 0905    dextrose 10% bolus 125 mL 125 mL  12.5 g Intravenous PRN Taco Gomes, FNP        dextrose 10% bolus 250 mL 250 mL  25 g Intravenous PRN Taco Gomes FNP        divalproex ER 24 hr tablet 500 mg  500 mg Oral QHS Taco Gomes, FNP   500 mg at 12/16/24 2012    ferrous sulfate tablet 1 each  1 tablet Oral Daily Taco Gomes, FNP   1 each at 12/17/24 0905    fluvoxaMINE tablet 100 mg  100 mg Oral BID Taco Gomes, FNP   100 mg at 12/17/24 0905    glucagon (human recombinant) injection 1 mg  1 mg Intramuscular PRN Taco Gomes, YEHUDAP        glucose chewable tablet 16 g  16 g Oral PRN Taco Gomes, YEHUDAP        glucose chewable tablet 24 g  24 g Oral PRN Taco Gomes, FNP        heparin (porcine) injection 5,000 Units  5,000 Units Subcutaneous Q12H Taco Gomes FNP   5,000 Units at 12/17/24 0905    LORazepam tablet 0.5 mg  0.5 mg Oral Q8H PRN Taco Gomes, KAZ        methocarbamoL tablet 750 mg  750 mg Oral BID PRN  Taoc Gomes FNP        mupirocin 2 % ointment   Nasal BID Francisco Javier Ulloa MD   Given at 12/16/24 2018    naloxone 0.4 mg/mL injection 0.02 mg  0.02 mg Intravenous PRN Taco Gomes FNP        OLANZapine tablet 20 mg  20 mg Oral Nightly Taco Gomes FNP   20 mg at 12/16/24 2012    ondansetron injection 4 mg  4 mg Intravenous Q4H PRN Taco Gomes FNP        pramipexole tablet 1 mg  1 mg Oral QHS Taco Gomes FNP   1 mg at 12/16/24 2013    prochlorperazine injection Soln 5 mg  5 mg Intravenous Q6H PRN Taco Gomes FNP        QUEtiapine tablet 25 mg  25 mg Oral QHS Taco Gomes FNP   25 mg at 12/16/24 2012    sodium chloride 0.9% flush 10 mL  10 mL Intravenous PRN Taco Gomes FNP        timolol maleate 0.5% ophthalmic solution 1 drop  1 drop Both Eyes BID Taco Gomes FNP   1 drop at 12/17/24 0905     Medications Prior to Admission   Medication Sig Dispense Refill Last Dose/Taking    amitriptyline (ELAVIL) 50 MG tablet Take 50 mg by mouth every evening.   12/14/2024    ASCORBIC ACID, BULK, MISC Take 1,000 mg by mouth every evening.   12/14/2024    busPIRone (BUSPAR) 15 MG tablet Take 15 mg by mouth 2 (two) times daily.   12/15/2024    calcium carbonate 260 mg calcium (650 mg) Chew Take 2 tablets by mouth Daily.   12/15/2024    carboxymethylcellulose (REFRESH PLUS) 0.5 % Dpet Place 1 drop into both eyes 3 (three) times daily as needed (dry eye).   12/14/2024    citalopram (CELEXA) 40 MG tablet Take 40 mg by mouth once daily.   12/15/2024    clonazePAM (KLONOPIN) 2 MG Tab Take 2 mg by mouth 2 (two) times daily as needed.   12/14/2024    cloNIDine (CATAPRES) 0.1 MG tablet Take 1 tablet by mouth once daily.   12/15/2024    divalproex ER (DEPAKOTE ER) 500 MG Tb24 24 hr tablet Take 500 mg by mouth every evening.   12/14/2024    ferrous gluconate (FERGON) 324 MG tablet Take 324 mg by mouth every evening.   12/14/2024     fluticasone propionate (FLONASE) 50 mcg/actuation nasal spray Flonase Allergy Relief Take 1 spray(s) (nasal) 2 times per day for 7 days [id number] spray,suspension 2 times per day nasal 7 days active 50 mcg/actuation   12/15/2024    fluvoxaMINE (LUVOX) 100 MG tablet Take 100 mg by mouth 2 (two) times a day.   12/15/2024    furosemide (LASIX) 40 MG tablet Take 40 mg by mouth once daily.   12/15/2024    gabapentin (NEURONTIN) 300 MG capsule Take 300 mg by mouth 3 (three) times daily.   12/15/2024    hydroCHLOROthiazide (MICROZIDE) 12.5 mg capsule Take 12.5 mg by mouth once daily.   12/15/2024    lisinopriL (PRINIVIL,ZESTRIL) 40 MG tablet Take 20 mg by mouth once daily.   12/15/2024    LORazepam (ATIVAN) 1 MG tablet Take 0.5 mg by mouth every 8 (eight) hours as needed for Anxiety.   12/14/2024    meclizine (ANTIVERT) 12.5 mg tablet Take 12.5 mg by mouth 2 (two) times daily as needed for Dizziness.   12/15/2024    methocarbamoL (ROBAXIN) 750 MG Tab Take 750 mg by mouth 2 (two) times daily as needed (pain).   12/15/2024    metoprolol tartrate (LOPRESSOR) 50 MG tablet Take 25 mg by mouth 2 (two) times daily.   12/15/2024    multivitamin (THERAGRAN) per tablet Take 1 tablet by mouth once daily.   12/15/2024    OLANZapine (ZYPREXA) 20 MG tablet Take 20 mg by mouth nightly.   12/14/2024    potassium chloride SA (K-DUR,KLOR-CON) 20 MEQ tablet Take 20 mEq by mouth once daily.   12/15/2024    pramipexole (MIRAPEX) 1 MG tablet Take 2 mg by mouth every evening.   12/14/2024    QUEtiapine (SEROQUEL) 25 MG Tab Take 25 mg by mouth every evening.   12/14/2024    senna-docusate 8.6-50 mg (PERICOLACE) 8.6-50 mg per tablet Take 2 tablets by mouth 2 (two) times a day.   12/15/2024    simvastatin (ZOCOR) 40 MG tablet Take 20 mg by mouth once daily.   12/15/2024    timoloL (BETIMOL) 0.5 % ophthalmic solution Place 1 drop into both eyes 2 (two) times daily.   12/15/2024    trihexyphenidyL (ARTANE) 2 MG tablet Take 2 mg by mouth 2 (two) times  daily with meals.   12/15/2024    vitamin D (VITAMIN D3) 1000 units Tab Take 1,000 Units by mouth once daily.   12/15/2024    acetaminophen (TYLENOL) 325 MG tablet Take 2 tablets by mouth every 6 (six) hours as needed for Pain.       HYDROcodone-acetaminophen (NORCO) 5-325 mg per tablet Take 1 tablet by mouth every 6 (six) hours as needed for Pain. 10 tablet 0     METOPROLOL SUCCINATE ORAL metoprolol succinate Take No date recorded No form recorded No frequency recorded No route recorded No set duration recorded No set duration amount recorded active No dosage strength recorded No dosage strength units of measure recorded   Unknown       Past Medical History:  Past Medical History:   Diagnosis Date    Abdominal pain     Hernia pain    Anemia     Anxiety     Arthritis of knee, right     Bowel obstruction     Carpal tunnel syndrome on both sides     Chronic pancreatitis     Constipation     Depression     Fatigue     HLD (hyperlipidemia)     Hypertension     Incisional hernia without obstruction or gangrene     Insomnia     Low back pain     Lumbar degenerative disc disease     Neuropathy     Obesity     Osteoarthritis     PTSD (post-traumatic stress disorder)     Restless leg     Right hip pain     Right knee pain     Schizophrenia     Sleep apnea     CPAP    Use of cane as ambulatory aid     Uses walker     Vertigo     Weakness        Past Surgical History:  Past Surgical History:   Procedure Laterality Date    abdominal wall hematoma drainage  2010    p bowel obstruction surgery    COLONOSCOPY      ESOPHAGOGASTRODUODENOSCOPY      EXPLORATORY LAPAROTOMY  2010    bowel obstruction    FINGER SURGERY Left     pinky repair after work injury    HIP REPLACEMENT ARTHROPLASTY Right     LAPAROSCOPIC NISSEN FUNDOPLICATION      ROBOT-ASSISTED LAPAROSCOPIC REPAIR OF INCISIONAL HERNIA N/A 11/21/2024    Procedure: ROBOTIC REPAIR, HERNIA, INCISIONAL;  Surgeon: Jose Delacruz MD;  Location: [facility];  Service: Oncology;   Laterality: N/A;  INCISIONAL HERNIA WITH MESH    ROTATOR CUFF REPAIR Left     VARICOSE VEIN SURGERY Left     leg       Family History:  No family history on file.    Social History:  Social History     Tobacco Use    Smoking status: Never    Smokeless tobacco: Never   Substance Use Topics    Alcohol use: Not Currently          Review of Systems:    Review of Systems   Constitutional:  Negative for appetite change, fever and unexpected weight change.   Respiratory:  Negative for cough and shortness of breath.    Cardiovascular:  Negative for chest pain and leg swelling.   Gastrointestinal:  Negative for abdominal distention, abdominal pain, anal bleeding, blood in stool, constipation, diarrhea, nausea and vomiting.   Genitourinary:  Negative for decreased urine volume, dysuria, enuresis and hematuria.   Musculoskeletal:  Negative for back pain and myalgias.   Skin:  Negative for color change and pallor.   Neurological:  Negative for speech difficulty.   All other systems reviewed and are negative.      Objective:    VITAL SIGNS: 24 HR MIN & MAX LAST  Temp  Min: 97.5 °F (36.4 °C)  Max: 98.1 °F (36.7 °C) 97.8 °F (36.6 °C)  BP  Min: 111/76  Max: 137/86 131/89  Pulse  Min: 92  Max: 109 92  Resp  Min: 18  Max: 18 18  SpO2  Min: 93 %  Max: 97 % 97 %      Intake/Output Summary (Last 24 hours) at 12/17/2024 1128  Last data filed at 12/17/2024 0351  Gross per 24 hour   Intake 420 ml   Output 3880 ml   Net -3460 ml       Physical Exam  Vitals and nursing note reviewed.   Constitutional:       General: He is not in acute distress.  HENT:      Head: Normocephalic and atraumatic.      Mouth/Throat:      Mouth: Mucous membranes are moist.   Eyes:      General: No scleral icterus.     Extraocular Movements: Extraocular movements intact.      Pupils: Pupils are equal, round, and reactive to light.   Cardiovascular:      Rate and Rhythm: Normal rate and regular rhythm.      Pulses: Normal pulses.      Heart sounds: Normal heart  sounds.   Pulmonary:      Effort: Pulmonary effort is normal. No respiratory distress.      Breath sounds: Normal breath sounds.   Abdominal:      General: Bowel sounds are normal. There is no distension.      Palpations: Abdomen is soft.      Tenderness: There is no abdominal tenderness.      Comments: Protuberant but benign abd. Palpable, nontender hernia with surgical incision   Musculoskeletal:         General: No swelling or deformity. Normal range of motion.      Right lower leg: No edema.      Left lower leg: No edema.   Skin:     General: Skin is warm and dry.   Neurological:      General: No focal deficit present.      Mental Status: He is alert and oriented to person, place, and time.      Comments: Awake, oriented x 3.    Psychiatric:         Mood and Affect: Mood normal.         Behavior: Behavior normal.       Dialysis Access: none    Recent Results (from the past 48 hours)   Blood Culture #1 **CANNOT BE ORDERED STAT**    Collection Time: 12/15/24 12:18 PM    Specimen: Antecubital, Right; Blood   Result Value Ref Range    Blood Culture No Growth At 24 Hours    Blood Culture #1 **CANNOT BE ORDERED STAT**    Collection Time: 12/15/24 12:18 PM    Specimen: Antecubital, Right; Blood   Result Value Ref Range    Blood Culture No Growth At 24 Hours    Lactic acid, plasma    Collection Time: 12/15/24 12:18 PM   Result Value Ref Range    Lactic Acid Level 0.6 0.5 - 2.2 mmol/L   POCT glucose    Collection Time: 12/15/24  1:02 PM   Result Value Ref Range    POCT Glucose 70 70 - 110 mg/dL   Urinalysis, Reflex to Urine Culture    Collection Time: 12/15/24  1:09 PM    Specimen: Urine, Catheterized   Result Value Ref Range    Color, UA Yellow Yellow, Light-Yellow, Colorless, Straw, Dark-Yellow    Appearance, UA Clear Clear    Specific Gravity, UA 1.008 1.005 - 1.030    pH, UA 5.0 5.0 - 8.5    Protein, UA Negative Negative    Glucose, UA Normal Negative, Normal    Ketones, UA Negative Negative    Blood, UA Trace (A)  Negative    Bilirubin, UA Negative Negative    Urobilinogen, UA Normal 0.2, 1.0, Normal    Nitrites, UA Negative Negative    Leukocyte Esterase, UA Negative Negative    RBC, UA 0-5 None Seen, 0-2, 3-5, 0-5 /HPF    WBC, UA 0-5 None Seen, 0-2, 3-5, 0-5 /HPF    Bacteria, UA Trace None Seen, Trace /HPF    Squamous Epithelial Cells, UA Trace None Seen, Trace, Rare /HPF    Mucous, UA Trace (A) None Seen /LPF   Drug Screen, Urine    Collection Time: 12/15/24  1:09 PM   Result Value Ref Range    Amphetamines, Urine Negative Negative    Barbiturates, Urine Negative Negative    Benzodiazepine, Urine Negative Negative    Cannabinoids, Urine Negative Negative    Cocaine, Urine Negative Negative    Fentanyl, Urine Negative Negative    MDMA, Urine Negative Negative    Opiates, Urine Positive (A) Negative    Phencyclidine, Urine Negative Negative    pH, Urine 5.0 3.0 - 11.0    Specific Gravity, Urine Auto 1.008 1.001 - 1.035   POCT glucose    Collection Time: 12/15/24  2:48 PM   Result Value Ref Range    POCT Glucose 123 (H) 70 - 110 mg/dL   Sodium, Random Urine    Collection Time: 12/15/24  3:33 PM   Result Value Ref Range    Urine Sodium 37.0 mmol/L   Protein, Random Urine    Collection Time: 12/15/24  3:33 PM   Result Value Ref Range    Urine Protein Level 9.8 mg/dL   Creatinine, Random Urine    Collection Time: 12/15/24  3:33 PM   Result Value Ref Range    Urine Creatinine 32.8 (L) 63.0 - 166.0 mg/dL   Osmolality, Urine    Collection Time: 12/15/24  3:33 PM   Result Value Ref Range    Urine Osmolality 196 (L) 300 - 1,300 mOsm/kg   Basic metabolic panel    Collection Time: 12/15/24  3:56 PM   Result Value Ref Range    Sodium 123 (L) 136 - 145 mmol/L    Potassium 4.6 3.5 - 5.1 mmol/L    Chloride 90 (L) 98 - 107 mmol/L    CO2 21 (L) 22 - 29 mmol/L    Glucose 94 74 - 100 mg/dL    Blood Urea Nitrogen 84.5 (H) 8.4 - 25.7 mg/dL    Creatinine 4.50 (H) 0.72 - 1.25 mg/dL    BUN/Creatinine Ratio 19     Calcium 7.1 (L) 8.4 - 10.2 mg/dL     Anion Gap 12.0 mEq/L    eGFR 14 mL/min/1.73/m2   TSH    Collection Time: 12/15/24  3:56 PM   Result Value Ref Range    TSH 0.623 0.350 - 4.940 uIU/mL   Basic Metabolic Panel    Collection Time: 12/15/24 10:03 PM   Result Value Ref Range    Sodium 127 (L) 136 - 145 mmol/L    Potassium 5.1 3.5 - 5.1 mmol/L    Chloride 92 (L) 98 - 107 mmol/L    CO2 21 (L) 22 - 29 mmol/L    Glucose 76 74 - 100 mg/dL    Blood Urea Nitrogen 82.8 (H) 8.4 - 25.7 mg/dL    Creatinine 3.95 (H) 0.72 - 1.25 mg/dL    BUN/Creatinine Ratio 21     Calcium 7.1 (L) 8.4 - 10.2 mg/dL    Anion Gap 14.0 mEq/L    eGFR 17 mL/min/1.73/m2   Lactic Acid, Plasma    Collection Time: 12/15/24 10:31 PM   Result Value Ref Range    Lactic Acid Level 0.5 0.5 - 2.2 mmol/L   Comprehensive Metabolic Panel    Collection Time: 12/16/24  1:48 AM   Result Value Ref Range    Sodium 129 (L) 136 - 145 mmol/L    Potassium 5.3 (H) 3.5 - 5.1 mmol/L    Chloride 96 (L) 98 - 107 mmol/L    CO2 22 22 - 29 mmol/L    Glucose 71 (L) 74 - 100 mg/dL    Blood Urea Nitrogen 80.1 (H) 8.4 - 25.7 mg/dL    Creatinine 3.54 (H) 0.72 - 1.25 mg/dL    Calcium 7.5 (L) 8.4 - 10.2 mg/dL    Protein Total 5.8 (L) 6.4 - 8.3 gm/dL    Albumin 2.1 (L) 3.5 - 5.0 g/dL    Globulin 3.7 (H) 2.4 - 3.5 gm/dL    Albumin/Globulin Ratio 0.6 (L) 1.1 - 2.0 ratio    Bilirubin Total 0.4 <=1.5 mg/dL    ALP 82 40 - 150 unit/L    ALT 32 0 - 55 unit/L    AST 39 (H) 5 - 34 unit/L    eGFR 19 mL/min/1.73/m2    Anion Gap 11.0 mEq/L    BUN/Creatinine Ratio 23    Magnesium    Collection Time: 12/16/24  1:48 AM   Result Value Ref Range    Magnesium Level 2.20 1.60 - 2.60 mg/dL   Cortisol    Collection Time: 12/16/24  1:48 AM   Result Value Ref Range    Cortisol 6.20 ug/dL   CBC with Differential    Collection Time: 12/16/24  1:48 AM   Result Value Ref Range    WBC 10.48 4.50 - 11.50 x10(3)/mcL    RBC 2.89 (L) 4.70 - 6.10 x10(6)/mcL    Hgb 8.7 (L) 14.0 - 18.0 g/dL    Hct 25.4 (L) 42.0 - 52.0 %    MCV 87.9 80.0 - 94.0 fL    MCH 30.1  27.0 - 31.0 pg    MCHC 34.3 33.0 - 36.0 g/dL    RDW 15.5 11.5 - 17.0 %    Platelet 164 130 - 400 x10(3)/mcL    MPV 8.7 7.4 - 10.4 fL    NRBC% 0.0 %   Manual Differential    Collection Time: 12/16/24  1:48 AM   Result Value Ref Range    WBC 10.48 x10(3)/mcL    Neutrophils % 86 %    Lymphs % 4 %    Monocytes % 5 %    Eosinophils % 3 %    Metamyelocytes % 2 (H) <=0 %    Neutrophils Abs 9.0128 2.1 - 9.2 x10(3)/mcL    Lymphs Abs 0.4192 (L) 0.6 - 4.6 x10(3)/mcL    Monocytes Abs 0.524 0.1 - 1.3 x10(3)/mcL    Eosinophils Abs 0.3144 0 - 0.9 x10(3)/mcL    Platelets Normal Normal, Adequate    RBC Morph Abnormal (A) Normal    Poikilocytosis 1+ (A) (none)    Macrocytosis 1+ (A) (none)    Jaylen Cells 1+ (A) (none)   Echo    Collection Time: 12/16/24 11:29 AM   Result Value Ref Range    BSA 2.16 m2    Karimi's Biplane MOD Ejection Fraction 63 %    A2C EF 56 %    A4C EF 69 %    LVIDd 5.2 3.5 - 6.0 cm    LV Systolic Volume 61.60 mL    LV Systolic Volume Index 29.6 mL/m2    LVIDs 3.8 2.1 - 4.0 cm    LV ESV A2C 85.40 mL    LV Diastolic Volume 130.00 mL    LV ESV A4C 80.90 mL    LV Diastolic Volume Index 62.50 mL/m2    LV EDV A2C 118 mL    LV EDV A4C 140.00 mL    Left Ventricular End Systolic Volume by Teichholz Method 61.60 mL    Left Ventricular End Diastolic Volume by Teichholz Method 130.00 mL    IVS 1.0 0.6 - 1.1 cm    FS 26.9 (A) 28 - 44 %    Left Ventricle Relative Wall Thickness 0.35 cm    PW 0.9 0.6 - 1.1 cm    LV mass 181.4 g    LV Mass Index 87.2 g/m2    MV Peak E Dexter 1.17 m/s    TDI LATERAL 0.15 m/s    TDI SEPTAL 0.13 m/s    E/E' ratio 8.36 m/s    MV Peak A Dexter 0.95 m/s    TR Max Dexter 2.41 m/s    E/A ratio 1.23     E wave deceleration time 172.00 msec    LV SEPTAL E/E' RATIO 9.00 m/s    LV LATERAL E/E' RATIO 7.80 m/s    LVOT peak dexter 1.4 m/s    Left Ventricular Outflow Tract Mean Velocity 0.98 cm/s    Left Ventricular Outflow Tract Mean Gradient 4.00 mmHg    TAPSE 2.75 cm    LA size 4.10 cm    LA Vol (MOD) 83.10 mL    YOHANNES  (MOD) 40.0 mL/m2    AV mean gradient 10.0 mmHg    AV peak gradient 17.6 mmHg    Ao peak jo ann 2.1 m/s    Ao VTI 33.0 cm    LVOT peak VTI 23.7 cm    AV Velocity Ratio 0.67     AV index (prosthetic) 0.72     Triscuspid Valve Regurgitation Peak Gradient 23 mmHg    PV PEAK VELOCITY 1.42 m/s    PV peak gradient 8 mmHg    Mean e' 0.14 m/s    ZLVIDS -0.20     ZLVIDD -2.03     LA area A4C 26.50 cm2    LA area A2C 26.90 cm2    RVDD 4.83 cm    TV resting pulmonary artery pressure 26 mmHg    RV TB RVSP 5 mmHg    Est. RA pres 3 mmHg   POCT glucose    Collection Time: 12/16/24 12:19 PM   Result Value Ref Range    POCT Glucose 106 70 - 110 mg/dL   POCT glucose    Collection Time: 12/17/24  6:26 AM   Result Value Ref Range    POCT Glucose 91 70 - 110 mg/dL   Comprehensive Metabolic Panel    Collection Time: 12/17/24  6:37 AM   Result Value Ref Range    Sodium 139 136 - 145 mmol/L    Potassium 5.0 3.5 - 5.1 mmol/L    Chloride 110 (H) 98 - 107 mmol/L    CO2 24 22 - 29 mmol/L    Glucose 99 74 - 100 mg/dL    Blood Urea Nitrogen 50.9 (H) 8.4 - 25.7 mg/dL    Creatinine 1.75 (H) 0.72 - 1.25 mg/dL    Calcium 7.7 (L) 8.4 - 10.2 mg/dL    Protein Total 5.3 (L) 6.4 - 8.3 gm/dL    Albumin 2.0 (L) 3.5 - 5.0 g/dL    Globulin 3.3 2.4 - 3.5 gm/dL    Albumin/Globulin Ratio 0.6 (L) 1.1 - 2.0 ratio    Bilirubin Total 0.4 <=1.5 mg/dL     40 - 150 unit/L    ALT 26 0 - 55 unit/L    AST 25 5 - 34 unit/L    eGFR 45 mL/min/1.73/m2    Anion Gap 5.0 mEq/L    BUN/Creatinine Ratio 29    CBC with Differential    Collection Time: 12/17/24  6:37 AM   Result Value Ref Range    WBC 12.10 (H) 4.50 - 11.50 x10(3)/mcL    RBC 3.25 (L) 4.70 - 6.10 x10(6)/mcL    Hgb 9.5 (L) 14.0 - 18.0 g/dL    Hct 29.0 (L) 42.0 - 52.0 %    MCV 89.2 80.0 - 94.0 fL    MCH 29.2 27.0 - 31.0 pg    MCHC 32.8 (L) 33.0 - 36.0 g/dL    RDW 15.7 11.5 - 17.0 %    Platelet 181 130 - 400 x10(3)/mcL    MPV 9.0 7.4 - 10.4 fL    NRBC% 0.0 %   Manual Differential    Collection Time: 12/17/24  6:37 AM    Result Value Ref Range    WBC 12.1 x10(3)/mcL    Neutrophils % 74 %    Lymphs % 9 %    Monocytes % 15 %    Eosinophils % 2 %    Myelocytes % 1 (H) <=0 %    Neutrophils Abs 8.954 2.1 - 9.2 x10(3)/mcL    Lymphs Abs 1.089 0.6 - 4.6 x10(3)/mcL    Monocytes Abs 1.815 (H) 0.1 - 1.3 x10(3)/mcL    Eosinophils Abs 0.242 0 - 0.9 x10(3)/mcL    Platelets Normal Normal, Adequate    RBC Morph Abnormal (A) Normal    Poikilocytosis 1+ (A) (none)    Target Cells 1+ (A) (none)       CT Chest Abdomen Pelvis Without Contrast (XPD)    Result Date: 12/15/2024  EXAMINATION: CT CHEST ABDOMEN PELVIS WITHOUT CONTRAST(XPD) CLINICAL HISTORY: Sepsis;Acute renal failure, sob, AMS; TECHNIQUE: CT of the chest, abdomen and pelvis without intravenous contrast. Coronal and sagittal reconstructions were obtained from the axial data set. Automatic exposure control was utilized to limit radiation dose. Radiation Dose: Total DLP: 16 17 mGy*cm COMPARISON: None FINDINGS: LINES/TUBES: None. AIRWAYS: Clear centrally. LUNGS: Clear. PLEURA: No pleural effusions. No pneumothoraces. HEART AND MEDIASTINUM: The heart is mildly enlarged.  There is no pericardial effusion.  There are small calcified mediastinal and lymph nodes. SOFT TISSUES: Normal. THORACIC BONES: No acute bony pathology. ABDOMEN/PELVIS: HEPATOBILIARY: The liver is unremarkable.  The gallbladder is distended. SPLEEN: Unremarkable PANCREAS: No focal masses or ductal dilatation. ADRENALS: No adrenal nodules. KIDNEYS/URETERS: No hydronephrosis. PELVIC ORGANS/BLADDER: Over distended urinary bladder. PERITONEUM/RETROPERITONEUM: No free intraperitoneal air. No free fluid. LYMPH NODES: No lymphadenopathy. VESSELS: Mild scattered vascular calcifications. GI TRACT: Moderate colonic stool volume.  No bowel obstruction. ABDOMINOPELVIC BONES AND SOFT TISSUE: Small amount of fluid in the anterior abdominal wall..  No acute bony pathology.     1. No definite acute abnormality of the abdomen or pelvis. 2.  Distended gallbladder without evidence of calcified stone. 3. Distended urinary bladder. Electronically signed by: Suni Mayes Date:    12/15/2024 Time:    12:23      Assessment & Plan:    TREVON - secondary to obstructive uropathy.  Improving renal functions.  Obstructive uropathy - continue vicente.  AMS - resolved  Hypotension - resolved  Hyperkalemia   Hyponatremia - start normal saline  Acidosis - closely monitor     Recommendations:  Patient and wife detailed that he needs to drink lot of water by mouth as he is making excellent urine output.  And he has improving kidney functions.  I will decrease IV fluids to 75 cc an hour  Check labs tomorrow.       yes

## 2024-12-17 NOTE — PROGRESS NOTES
[facility] Hospital Medicine Progress Note        Chief Complaint: Inpatient Follow-up for ARF    HPI:   Trey Hancock is a 59 y.o. male who PMH includes anemia, anxiety, depression, osteoarthritis of the knees, carpal tunnel syndrome, chronic pancreatitis, schizoaffective disorder, HTN, HLD, chronic neck and back pain, lumbar degenerative disc disease, neuropathy, PTSD, RLS, JANETH with CPAP, vertigo; patient presented to [facility] on 12/15/2024 with a primary complaint of AMS and edema to BLE over the past 2 days. PT also reports generalized weakness with associated SOB.  Patient has a history of hernia repair by Dr Delacruz on 11/21/2024.  He has done fine postop up until a couple of days ago when it was reported per the family patient was confused and altered with generalized weakness and edema to lower extremities.  No reports chest pain, fever, chills, cough, congestion, or any sick contacts.  Patient does have a history of polysubstance abuse of alcohol, cocaine, benzodiazepines and opioids; no reports of recent abuse.  Patient also has had issues with urinary retention and difficulty emptying his bladder over the past several days.  Indwelling catheter was placed in the ED with return of over 2000 mL of urine with an additional 800 mL in collection bag after emptying.  It is reported patient has not had any issues with his kidneys in the past. Labs reviewed demonstrated WBCs 11.70, RBCs 3.00, H&H 9.1/26.5, PT 15.7, INR 1.3, sodium 120 we will repeat values 123, potassium 6.1 with repeat values 4.6, chloride 87 will repeat values 90, CO2 20 with repeat values 21; BUN 87.4 will repeat values 84.5, creatinine 4.89 with repeat values 4.50, glucose 650 repeat values 94, calcium 7.4 repeat value 7.1, .9, CPK 91, troponin 0.031; urine creatinine 32.8; other indices unremarkable. Urine drug screen positive for opiates.  Chest x-ray impression reviewed prominent bronchovascular  markings which may be due to hypo aeration or mild edema.  CT head without contrast impression reviewed demonstrated no acute intracranial abnormality, chronic microvascular ischemic changes.  CT of the chest abdomen and pelvis without contrast impression reviewed demonstrated no definitive acute abnormality of the abdomen or pelvis, distended gallbladder without evidence of calcified stone, distended urinary bladder.  Renal ultrasound impression reviewed demonstrated no acute kidney abnormality.      Initial vital signs /103 pulse 100 respirations 22 temperature 98.1° F O2 saturation 100% on room air.  Patient did have drop in blood pressure with the lowest 78/53 O2 saturation dropped into the low 90s.  Patient was given IV hydration and placed on supplemental oxygen therapy with improvement in his hemodynamics.  Renal Services have been consulted per ED and evaluated the patient.  Patient is admitted to hospital medicine services for further management.    Patient admitted with diagnosis of ARF ? Obstruction pathology. Valdez continued and renal team consulted. IV fluids was continued. Renal functions gradually improved.     Later patient admitted to taking 2-3 lasix at home past 10 days, for LE edema.     Interval Hx:   Patient today awake and comfortable. Denies any SOB or cough. Ambulating to the rest room.     Family at bedside, explained in detail about the patients condition, diagnosis, vitals, labs and treatment plan. They understand and agree with the plan. All their questions were answered.      Case was discussed with patient's nurse and  on the floor.    Objective/physical exam:  General: In no acute distress, Obese   Chest: Clear to auscultation bilaterally  Heart: RRR, +S1, S2, no appreciable murmur  Abdomen: Soft, nontender, BS +  MSK: Warm, + lower extremity edema, no clubbing or cyanosis  Neurologic: Alert and oriented x4, Cranial nerve II-XII intact, Strength 5/5 in all 4  extremities    VITAL SIGNS: 24 HRS MIN & MAX LAST   Temp  Min: 97.5 °F (36.4 °C)  Max: 98.1 °F (36.7 °C) 97.5 °F (36.4 °C)   BP  Min: 111/76  Max: 137/86 136/83   Pulse  Min: 92  Max: 109  96   Resp  Min: 18  Max: 18 18   SpO2  Min: 93 %  Max: 97 % 95 %     I have reviewed the following labs:  Recent Labs   Lab 12/15/24  1052 12/16/24  0148 12/17/24  0637   WBC 11.7  11.70* 10.48  10.48 12.1  12.10*   RBC 3.00* 2.89* 3.25*   HGB 9.1* 8.7* 9.5*   HCT 26.5* 25.4* 29.0*   MCV 88.3 87.9 89.2   MCH 30.3 30.1 29.2   MCHC 34.3 34.3 32.8*   RDW 15.2 15.5 15.7    164 181   MPV 8.7 8.7 9.0     Recent Labs   Lab 12/15/24  1052 12/15/24  1058 12/15/24  1556 12/15/24  2203 12/16/24  0148 12/17/24  0637   *  --    < > 127* 129* 139   K 6.1*  --    < > 5.1 5.3* 5.0   CL 87*  --    < > 92* 96* 110*   CO2 20*  --    < > 21* 22 24   BUN 87.4*  --    < > 82.8* 80.1* 50.9*   CREATININE 4.89*  --    < > 3.95* 3.54* 1.75*   CALCIUM 7.4*  --    < > 7.1* 7.5* 7.7*   PH  --  7.330*  --   --   --   --    MG 2.20  --   --   --  2.20  --    ALBUMIN 2.3*  --   --   --  2.1* 2.0*   ALKPHOS 88  --   --   --  82 111   ALT 34  --   --   --  32 26   AST 51*  --   --   --  39* 25   BILITOT 0.5  --   --   --  0.4 0.4    < > = values in this interval not displayed.     Microbiology Results (last 7 days)       Procedure Component Value Units Date/Time    Blood Culture #1 **CANNOT BE ORDERED STAT** [[id number]]  (Normal) Collected: 12/15/24 1218    Order Status: Completed Specimen: Blood from Antecubital, Right Updated: 12/16/24 1501     Blood Culture No Growth At 24 Hours    Blood Culture #1 **CANNOT BE ORDERED STAT** [[id number]]  (Normal) Collected: 12/15/24 1218    Order Status: Completed Specimen: Blood from Antecubital, Right Updated: 12/16/24 1501     Blood Culture No Growth At 24 Hours             See below for Radiology    Assessment/Plan:  Acute encephalopathy, uremic - resolved   Acute renal failure ? Obstructive pathology    Acute urinary retention-requiring indwelling catheter-POA  Hypovolemic hypotension-POA resolved   Hyperkalemia-suspected secondary to acute renal failure-POA Improved   Anemia-chronic disease-POA  Weakness- POA     Hx of chronic neck and back pain, history of polysubstance abuse, anxiety, depression, schizoaffective disorder, HLD, HTN, osteoarthritis, JANETH with CPAP, DJD knees, carpal tunnel syndrome, chronic pancreatitis, PTSD, RLS, vertigo    Plan:  Patient has no new issues. Lungs clear   Valdez draining clear yellow urine   Will continue iv fluids per renal team   US KUB unremarkable   Renal functions improving, Today BUN 50, Crt 1.75, Na 139, K 5.0, Hb 9.5    Continue supportive care     No NSAIDS     VTE prophylaxis: Heparin sq     Patient condition:  fair     Anticipated discharge and Disposition:   Home       All diagnosis and differential diagnosis have been reviewed; assessment and plan has been documented; I have personally reviewed the labs and test results that are presently available; I have reviewed the patients medication list; I have reviewed the consulting providers response and recommendations. I have reviewed or attempted to review medical records based upon their availability    All of the patient's questions have been  addressed and answered. Patient's is agreeable to the above stated plan. I will continue to monitor closely and make adjustments to medical management as needed.    Portions of this note dictated using EMR integrated voice recognition software, and may be subject to voice recognition errors not corrected at proofreading. Please contact writer for clarification if needed.   _____________________________________________________________________    Malnutrition Status:  Nutrition consulted. Most recent weight and BMI monitored-     Measurements:  Wt Readings from Last 1 Encounters:   12/17/24 111.6 kg (246 lb)   Body mass index is 39.71 kg/m².    Patient has been screened and  assessed by RD.    Malnutrition Type:  Context:    Level:      Malnutrition Characteristic Summary:       Interventions/Recommendations (treatment strategy):        Scheduled Med:   amitriptyline  50 mg Oral QHS    calcium carbonate  500 mg Oral Daily    citalopram  40 mg Oral Daily AM    divalproex ER  500 mg Oral QHS    ferrous sulfate  1 tablet Oral Daily    fluvoxaMINE  100 mg Oral BID    heparin (porcine)  5,000 Units Subcutaneous Q12H    mupirocin   Nasal BID    OLANZapine  20 mg Oral Nightly    pramipexole  1 mg Oral QHS    QUEtiapine  25 mg Oral QHS    timolol maleate 0.5%  1 drop Both Eyes BID      Continuous Infusions:   0.9% NaCl   Intravenous Continuous 150 mL/hr at 12/17/24 0137 New Bag at 12/17/24 0137      PRN Meds:    Current Facility-Administered Medications:     acetaminophen, 1,000 mg, Oral, Q6H PRN    acetaminophen, 650 mg, Oral, Q4H PRN    [COMPLETED] calcium gluconate IVPB, 1 g, Intravenous, ED 1 Time **AND** calcium gluconate IVPB, 1 g, Intravenous, Q10 Min PRN    dextrose 10%, 12.5 g, Intravenous, PRN    dextrose 10%, 25 g, Intravenous, PRN    glucagon (human recombinant), 1 mg, Intramuscular, PRN    glucose, 16 g, Oral, PRN    glucose, 24 g, Oral, PRN    LORazepam, 0.5 mg, Oral, Q8H PRN    methocarbamoL, 750 mg, Oral, BID PRN    naloxone, 0.02 mg, Intravenous, PRN    ondansetron, 4 mg, Intravenous, Q4H PRN    prochlorperazine, 5 mg, Intravenous, Q6H PRN    sodium chloride 0.9%, 10 mL, Intravenous, PRN     Radiology:  I have personally reviewed the following imaging and agree with the radiologist.     Echo    Left Ventricle: The left ventricle is mildly dilated. Normal wall   thickness. There is normal systolic function with a visually estimated   ejection fraction of 60 - 65%. Grade II diastolic dysfunction.    Right Ventricle: Mild right ventricular enlargement. Systolic function   is normal. TAPSE is 2.75 cm.    Left Atrium: Left atrium is mildly dilated.    Aortic Valve: Mildly calcified  cusps. There is mild stenosis. Aortic   valve peak velocity is 2.1 m/s. Mean gradient is 10.0 mmHg. The   dimensionless index is 0.72.    Mitral Valve: Mildly thickened leaflets.    Tricuspid Valve: There is mild regurgitation.    IVC/SVC: Normal venous pressure at 3 mmHg.    Pericardium: There is a small effusion.      Kristian Henao MD  Department of Hospital Medicine   [facility]   12/17/2024

## 2024-12-18 VITALS
OXYGEN SATURATION: 97 % | WEIGHT: 249.56 LBS | SYSTOLIC BLOOD PRESSURE: 136 MMHG | DIASTOLIC BLOOD PRESSURE: 78 MMHG | HEIGHT: 66 IN | RESPIRATION RATE: 18 BRPM | TEMPERATURE: 98 F | BODY MASS INDEX: 40.11 KG/M2 | HEART RATE: 93 BPM

## 2024-12-18 LAB
ABS NEUT (OLG): 8.8 X10(3)/MCL (ref 2.1–9.2)
ALBUMIN SERPL-MCNC: 2 G/DL (ref 3.5–5)
ALBUMIN/GLOB SERPL: 0.6 RATIO (ref 1.1–2)
ALP SERPL-CCNC: 102 UNIT/L (ref 40–150)
ALT SERPL-CCNC: 22 UNIT/L (ref 0–55)
ANION GAP SERPL CALC-SCNC: 7 MEQ/L
AST SERPL-CCNC: 25 UNIT/L (ref 5–34)
BILIRUB SERPL-MCNC: 0.3 MG/DL
BUN SERPL-MCNC: 23.4 MG/DL (ref 8.4–25.7)
CALCIUM SERPL-MCNC: 7.7 MG/DL (ref 8.4–10.2)
CHLORIDE SERPL-SCNC: 109 MMOL/L (ref 98–107)
CO2 SERPL-SCNC: 21 MMOL/L (ref 22–29)
CREAT SERPL-MCNC: 0.94 MG/DL (ref 0.72–1.25)
CREAT/UREA NIT SERPL: 25
EOSINOPHIL NFR BLD MANUAL: 0.11 X10(3)/MCL (ref 0–0.9)
EOSINOPHIL NFR BLD MANUAL: 1 %
ERYTHROCYTE [DISTWIDTH] IN BLOOD BY AUTOMATED COUNT: 15.8 % (ref 11.5–17)
GFR SERPLBLD CREATININE-BSD FMLA CKD-EPI: >60 ML/MIN/1.73/M2
GLOBULIN SER-MCNC: 3.4 GM/DL (ref 2.4–3.5)
GLUCOSE SERPL-MCNC: 84 MG/DL (ref 74–100)
HCT VFR BLD AUTO: 28.1 % (ref 42–52)
HGB BLD-MCNC: 9.3 G/DL (ref 14–18)
INSTRUMENT WBC (OLG): 10.73 X10(3)/MCL
LYMPHOCYTES NFR BLD MANUAL: 0.43 X10(3)/MCL
LYMPHOCYTES NFR BLD MANUAL: 4 %
MCH RBC QN AUTO: 29.8 PG (ref 27–31)
MCHC RBC AUTO-ENTMCNC: 33.1 G/DL (ref 33–36)
MCV RBC AUTO: 90.1 FL (ref 80–94)
MONOCYTES NFR BLD MANUAL: 1.39 X10(3)/MCL (ref 0.1–1.3)
MONOCYTES NFR BLD MANUAL: 13 %
NEUTROPHILS NFR BLD MANUAL: 82 %
NRBC BLD AUTO-RTO: 0 %
PLASMA CELLS BLD QL SMEAR: 1 %
PLATELET # BLD AUTO: 159 X10(3)/MCL (ref 130–400)
PLATELET # BLD EST: ADEQUATE 10*3/UL
PMV BLD AUTO: 8.9 FL (ref 7.4–10.4)
POTASSIUM SERPL-SCNC: 5 MMOL/L (ref 3.5–5.1)
PROT SERPL-MCNC: 5.4 GM/DL (ref 6.4–8.3)
RBC # BLD AUTO: 3.12 X10(6)/MCL (ref 4.7–6.1)
RBC MORPH BLD: NORMAL
SODIUM SERPL-SCNC: 137 MMOL/L (ref 136–145)
WBC # BLD AUTO: 10.73 X10(3)/MCL (ref 4.5–11.5)

## 2024-12-18 PROCEDURE — 25000003 PHARM REV CODE 250: Performed by: INTERNAL MEDICINE

## 2024-12-18 PROCEDURE — 80053 COMPREHEN METABOLIC PANEL: CPT | Performed by: INTERNAL MEDICINE

## 2024-12-18 PROCEDURE — 85025 COMPLETE CBC W/AUTO DIFF WBC: CPT | Performed by: INTERNAL MEDICINE

## 2024-12-18 PROCEDURE — 36415 COLL VENOUS BLD VENIPUNCTURE: CPT | Performed by: INTERNAL MEDICINE

## 2024-12-18 PROCEDURE — 63600175 PHARM REV CODE 636 W HCPCS: Performed by: NURSE PRACTITIONER

## 2024-12-18 PROCEDURE — 25000003 PHARM REV CODE 250: Performed by: NURSE PRACTITIONER

## 2024-12-18 RX ORDER — TAMSULOSIN HYDROCHLORIDE 0.4 MG/1
0.4 CAPSULE ORAL DAILY
Qty: 30 CAPSULE | Refills: 11 | Status: SHIPPED | OUTPATIENT
Start: 2024-12-18 | End: 2025-12-18

## 2024-12-18 RX ORDER — TAMSULOSIN HYDROCHLORIDE 0.4 MG/1
0.4 CAPSULE ORAL DAILY
Status: DISCONTINUED | OUTPATIENT
Start: 2024-12-18 | End: 2024-12-18 | Stop reason: HOSPADM

## 2024-12-18 RX ADMIN — TAMSULOSIN HYDROCHLORIDE 0.4 MG: 0.4 CAPSULE ORAL at 12:12

## 2024-12-18 RX ADMIN — HEPARIN SODIUM 5000 UNITS: 5000 INJECTION, SOLUTION INTRAVENOUS; SUBCUTANEOUS at 08:12

## 2024-12-18 RX ADMIN — FERROUS SULFATE TAB 325 MG (65 MG ELEMENTAL FE) 1 EACH: 325 (65 FE) TAB at 08:12

## 2024-12-18 RX ADMIN — TIMOLOL MALEATE 1 DROP: 5 SOLUTION OPHTHALMIC at 08:12

## 2024-12-18 RX ADMIN — CITALOPRAM HYDROBROMIDE 40 MG: 20 TABLET ORAL at 08:12

## 2024-12-18 RX ADMIN — MUPIROCIN: 20 OINTMENT TOPICAL at 08:12

## 2024-12-18 RX ADMIN — SODIUM CHLORIDE: 9 INJECTION, SOLUTION INTRAVENOUS at 01:12

## 2024-12-18 RX ADMIN — FLUVOXAMINE MALEATE 100 MG: 50 TABLET ORAL at 08:12

## 2024-12-18 NOTE — PLAN OF CARE
12/18/24 1021   Final Note   Assessment Type Final Discharge Note   Anticipated Discharge Disposition Home   Post-Acute Status   Discharge Delays None known at this time     Pt to d/c home. No CM needs known at this time.    Lisandra Shoemaker LCSW

## 2024-12-18 NOTE — DISCHARGE SUMMARY
[facility] Medicine Discharge Summary    Admit Date: 12/15/2024  Discharge Date and Time: 12/18/202411:33 AM  Admitting Physician:  Team  Discharging Physician: Kristian Henao MD.  Primary Care Physician: [facility]  Consults: Nephrology    Discharge Diagnoses:  Acute encephalopathy, uremic - resolved   Acute renal failure secondary to Obstructive pathology   Acute urinary retention-requiring indwelling catheter-POA  Hypovolemic hypotension-POA resolved   Hyperkalemia-suspected secondary to acute renal failure-POA Improved   Anemia-chronic disease-POA  Weakness- POA     Hx of chronic neck and back pain, history of polysubstance abuse, anxiety, depression, schizoaffective disorder, HLD, HTN, osteoarthritis, JANETH with CPAP, DJD knees, carpal tunnel syndrome, chronic pancreatitis, PTSD, RLS, vertigo    Hospital Course:   Trey Hancock is a 59 y.o. male who PMH includes anemia, anxiety, depression, osteoarthritis of the knees, carpal tunnel syndrome, chronic pancreatitis, schizoaffective disorder, HTN, HLD, chronic neck and back pain, lumbar degenerative disc disease, neuropathy, PTSD, RLS, JANETH with CPAP, vertigo; patient presented to [facility] on 12/15/2024 with a primary complaint of AMS and edema to BLE over the past 2 days. PT also reports generalized weakness with associated SOB.  Patient has a history of hernia repair by Dr Delacruz on 11/21/2024.  He has done fine postop up until a couple of days ago when it was reported per the family patient was confused and altered with generalized weakness and edema to lower extremities.  No reports chest pain, fever, chills, cough, congestion, or any sick contacts.  Patient does have a history of polysubstance abuse of alcohol, cocaine, benzodiazepines and opioids; no reports of recent abuse.  Patient also has had issues with urinary retention and difficulty emptying his bladder over the past several days.  Indwelling  catheter was placed in the ED with return of over 2000 mL of urine with an additional 800 mL in collection bag after emptying.  It is reported patient has not had any issues with his kidneys in the past. Labs reviewed demonstrated WBCs 11.70, RBCs 3.00, H&H 9.1/26.5, PT 15.7, INR 1.3, sodium 120 we will repeat values 123, potassium 6.1 with repeat values 4.6, chloride 87 will repeat values 90, CO2 20 with repeat values 21; BUN 87.4 will repeat values 84.5, creatinine 4.89 with repeat values 4.50, glucose 650 repeat values 94, calcium 7.4 repeat value 7.1, .9, CPK 91, troponin 0.031; urine creatinine 32.8; other indices unremarkable. Urine drug screen positive for opiates.  Chest x-ray impression reviewed prominent bronchovascular markings which may be due to hypo aeration or mild edema.  CT head without contrast impression reviewed demonstrated no acute intracranial abnormality, chronic microvascular ischemic changes.  CT of the chest abdomen and pelvis without contrast impression reviewed demonstrated no definitive acute abnormality of the abdomen or pelvis, distended gallbladder without evidence of calcified stone, distended urinary bladder.  Renal ultrasound impression reviewed demonstrated no acute kidney abnormality.       Initial vital signs /103 pulse 100 respirations 22 temperature 98.1° F O2 saturation 100% on room air.  Patient did have drop in blood pressure with the lowest 78/53 O2 saturation dropped into the low 90s.  Patient was given IV hydration and placed on supplemental oxygen therapy with improvement in his hemodynamics.  Renal Services have been consulted per ED and evaluated the patient.  Patient is admitted to hospital medicine services for further management.     Patient admitted with diagnosis of ARF ? Obstruction pathology. Valdez continued and renal team consulted. IV fluids was continued. Renal functions gradually improved.      Later patient admitted to taking 2-3 lasix at  home past 10 days, for LE edema.      With just iv fluids his renal functions improved. His vicente was removed and he was urinating. Flomax was also added. His home meds was reviewed and lasix was stopped. He will be discharged home when cleared by renal team. Advised no NSAIDS.   Pt was seen and examined on the day of discharge  Vitals:  VITAL SIGNS: 24 HRS MIN & MAX LAST   Temp  Min: 97.7 °F (36.5 °C)  Max: 98.5 °F (36.9 °C) 97.9 °F (36.6 °C)   BP  Min: 133/87  Max: 187/92 136/78   Pulse  Min: 91  Max: 96  93   Resp  Min: 18  Max: 21 18   SpO2  Min: 94 %  Max: 98 % 97 %       Physical Exam:  Heart RRR  Lungs clear   Abdomen soft and non tender   Neuro: No FND      Procedures Performed: No admission procedures for hospital encounter.     Significant Diagnostic Studies: See Full reports for all details    Recent Labs   Lab 12/16/24  0148 12/17/24  0637 12/18/24  0615   WBC 10.48  10.48 12.1  12.10* 10.73  10.73   RBC 2.89* 3.25* 3.12*   HGB 8.7* 9.5* 9.3*   HCT 25.4* 29.0* 28.1*   MCV 87.9 89.2 90.1   MCH 30.1 29.2 29.8   MCHC 34.3 32.8* 33.1   RDW 15.5 15.7 15.8    181 159   MPV 8.7 9.0 8.9       Recent Labs   Lab 12/15/24  1052 12/15/24  1058 12/15/24  1556 12/16/24  0148 12/17/24  0637 12/18/24  0615   *  --    < > 129* 139 137   K 6.1*  --    < > 5.3* 5.0 5.0   CL 87*  --    < > 96* 110* 109*   CO2 20*  --    < > 22 24 21*   BUN 87.4*  --    < > 80.1* 50.9* 23.4   CREATININE 4.89*  --    < > 3.54* 1.75* 0.94   CALCIUM 7.4*  --    < > 7.5* 7.7* 7.7*   PH  --  7.330*  --   --   --   --    MG 2.20  --   --  2.20  --   --    ALBUMIN 2.3*  --   --  2.1* 2.0* 2.0*   ALKPHOS 88  --   --  82 111 102   ALT 34  --   --  32 26 22   AST 51*  --   --  39* 25 25   BILITOT 0.5  --   --  0.4 0.4 0.3    < > = values in this interval not displayed.        Microbiology Results (last 7 days)       Procedure Component Value Units Date/Time    Blood Culture #1 **CANNOT BE ORDERED STAT** [[id number]]  (Normal)  Collected: 12/15/24 1218    Order Status: Completed Specimen: Blood from Antecubital, Right Updated: 12/17/24 1501     Blood Culture No Growth At 48 Hours    Blood Culture #1 **CANNOT BE ORDERED STAT** [[id number]]  (Normal) Collected: 12/15/24 1218    Order Status: Completed Specimen: Blood from Antecubital, Right Updated: 12/17/24 1501     Blood Culture No Growth At 48 Hours             Echo    Left Ventricle: The left ventricle is mildly dilated. Normal wall   thickness. There is normal systolic function with a visually estimated   ejection fraction of 60 - 65%. Grade II diastolic dysfunction.    Right Ventricle: Mild right ventricular enlargement. Systolic function   is normal. TAPSE is 2.75 cm.    Left Atrium: Left atrium is mildly dilated.    Aortic Valve: Mildly calcified cusps. There is mild stenosis. Aortic   valve peak velocity is 2.1 m/s. Mean gradient is 10.0 mmHg. The   dimensionless index is 0.72.    Mitral Valve: Mildly thickened leaflets.    Tricuspid Valve: There is mild regurgitation.    IVC/SVC: Normal venous pressure at 3 mmHg.    Pericardium: There is a small effusion.         Medication List        START taking these medications      tamsulosin 0.4 mg Cap  Commonly known as: FLOMAX  Take 1 capsule (0.4 mg total) by mouth once daily.            CONTINUE taking these medications      acetaminophen 325 MG tablet  Commonly known as: TYLENOL     amitriptyline 50 MG tablet  Commonly known as: ELAVIL     busPIRone 15 MG tablet  Commonly known as: BUSPAR     calcium carbonate 260 mg calcium (650 mg) Chew     citalopram 40 MG tablet  Commonly known as: CeleXA     clonazePAM 2 MG Tab  Commonly known as: KlonoPIN     divalproex  MG Tb24 24 hr tablet  Commonly known as: DEPAKOTE ER     ferrous gluconate 324 MG tablet  Commonly known as: FERGON     fluticasone propionate 50 mcg/actuation nasal spray  Commonly known as: FLONASE     fluvoxaMINE 100 MG tablet  Commonly known as: LUVOX     gabapentin 300  MG capsule  Commonly known as: NEURONTIN     LORazepam 1 MG tablet  Commonly known as: ATIVAN     metoprolol tartrate 50 MG tablet  Commonly known as: LOPRESSOR     multivitamin per tablet  Commonly known as: THERAGRAN     OLANZapine 20 MG tablet  Commonly known as: ZyPREXA     pramipexole 1 MG tablet  Commonly known as: MIRAPEX     QUEtiapine 25 MG Tab  Commonly known as: SEROQUEL     senna-docusate 8.6-50 mg 8.6-50 mg per tablet  Commonly known as: PERICOLACE     simvastatin 40 MG tablet  Commonly known as: ZOCOR     timoloL 0.5 % ophthalmic solution  Commonly known as: BETIMOL     trihexyphenidyL 2 MG tablet  Commonly known as: ARTANE     vitamin D 1000 units Tab  Commonly known as: VITAMIN D3            STOP taking these medications      ASCORBIC ACID (BULK) MISC     carboxymethylcellulose 0.5 % Dpet  Commonly known as: REFRESH PLUS     cloNIDine 0.1 MG tablet  Commonly known as: CATAPRES     furosemide 40 MG tablet  Commonly known as: LASIX     hydroCHLOROthiazide 12.5 mg capsule  Commonly known as: MICROZIDE     HYDROcodone-acetaminophen 5-325 mg per tablet  Commonly known as: NORCO     lisinopriL 40 MG tablet  Commonly known as: PRINIVIL,ZESTRIL     meclizine 12.5 mg tablet  Commonly known as: ANTIVERT     methocarbamoL 750 MG Tab  Commonly known as: ROBAXIN     METOPROLOL SUCCINATE ORAL     potassium chloride SA 20 MEQ tablet  Commonly known as: K-DUR,KLOR-CON               Where to Get Your Medications        These medications were sent to [id number] - [address] AT [facility] [place] [address] [address]      Phone: [phone]   tamsulosin 0.4 mg Cap          Explained in detail to the patient about the discharge plan, medications, and follow-up visits. Pt understands and agrees with the treatment plan  Discharge Disposition: Home or Self Care   Discharged Condition: stable  Diet-   Dietary Orders (From admission, onward)       Start      Ordered    12/15/24 1758  Diet Renal Non-Dialysis  Diet effective now         12/15/24 1759                   Medications Per DC med rec  Activities as tolerated   Follow-up Information       [facility]. Go on 1/2/2025.    Why: @ 9:00 AM  Contact information:  [place] [id number]  [phone]               [facility]. Go on 12/27/2024.    Why: @ 8:30 AM To do Labs  Contact information:  [address]                         For further questions contact hospitalist office    Discharge time 33 minutes    For worsening symptoms, chest pain, shortness of breath, increased abdominal pain, high grade fever, stroke or stroke like symptoms, immediately go to the nearest Emergency Room or call 911 as soon as possible.      Kristian Segovia M.D, on 12/18/2024. at 11:33 AM.

## 2024-12-20 LAB
BACTERIA BLD CULT: NORMAL
BACTERIA BLD CULT: NORMAL

## 2024-12-24 NOTE — PHYSICIAN QUERY
Please clarify the conflicting documentation regarding encephalopathy.  Metabolic encephalopathy

## 2025-03-28 PROBLEM — E11.9 TYPE 2 DIABETES MELLITUS WITHOUT COMPLICATIONS: Status: ACTIVE | Noted: 2025-03-28

## 2025-03-28 PROBLEM — F41.1 GENERALIZED ANXIETY DISORDER: Status: ACTIVE | Noted: 2025-03-28

## 2025-03-28 PROBLEM — F25.9 SCHIZOAFFECTIVE DISORDER: Status: ACTIVE | Noted: 2024-10-09

## 2025-03-28 PROBLEM — F10.11 H/O ALCOHOL ABUSE: Chronic | Status: ACTIVE | Noted: 2025-03-28

## 2025-03-28 PROBLEM — E55.9 VITAMIN D DEFICIENCY: Status: ACTIVE | Noted: 2025-03-28

## 2025-03-28 PROBLEM — M21.611 BILATERAL BUNIONS: Chronic | Status: ACTIVE | Noted: 2025-03-28

## 2025-03-28 PROBLEM — G89.29 OTHER CHRONIC PAIN: Status: ACTIVE | Noted: 2025-03-28

## 2025-03-28 PROBLEM — G47.30 SLEEP APNEA: Status: ACTIVE | Noted: 2025-03-28

## 2025-03-28 PROBLEM — F11.20 OPIOID DEPENDENCE: Status: ACTIVE | Noted: 2025-03-28

## 2025-03-28 PROBLEM — M51.369 DEGENERATION OF LUMBAR INTERVERTEBRAL DISC: Status: ACTIVE | Noted: 2025-03-28

## 2025-03-28 PROBLEM — M21.612 BILATERAL BUNIONS: Chronic | Status: ACTIVE | Noted: 2025-03-28

## 2025-03-28 PROBLEM — F43.12 POST-TRAUMATIC STRESS DISORDER, CHRONIC: Status: ACTIVE | Noted: 2025-03-28

## 2025-03-28 PROBLEM — I10 HYPERTENSION: Status: ACTIVE | Noted: 2025-03-28

## 2025-03-28 PROBLEM — F19.10 SUBSTANCE ABUSE: Status: ACTIVE | Noted: 2024-10-07

## 2025-03-28 PROBLEM — D64.9 ANEMIA: Status: ACTIVE | Noted: 2024-09-30

## 2025-03-28 PROBLEM — M17.10 LOCALIZED OSTEOARTHROSIS, LOWER LEG: Status: ACTIVE | Noted: 2025-03-28

## 2025-03-28 PROBLEM — F45.41 PSYCHOGENIC PAIN: Status: ACTIVE | Noted: 2025-03-28

## 2025-03-28 PROBLEM — B35.1 TINEA UNGUIUM: Status: ACTIVE | Noted: 2025-03-28

## 2025-03-28 PROBLEM — E66.9 OBESITY: Status: ACTIVE | Noted: 2025-03-28

## 2025-03-28 PROBLEM — E78.5 HYPERLIPIDEMIA: Chronic | Status: ACTIVE | Noted: 2025-03-28

## 2025-03-28 NOTE — PROGRESS NOTES
Subjective:       Patient ID: Trey Hancock is a 60 y.o. male.    [facility]     Anemia--Since 7/2023    Work-up:  9/18/24--peripheral smear unremarkable, iron 46, TIBC 251, iron saturation 18%, ferritin 110, folate 15, B12 683, retic 2.06, , ANIRUDH negative, RA IgA and IgM negative, IgG 2,048 (high), IgM 50 (normal) and IgA 326 (normal), SPEP with polyclonal increase in immunoglobulins, no M-spike, K/L ratio 1.84 (slightly high), RF <13, kraig negative.     Current treatment plan: Observation. Oral iron daily.     Chief Complaint: 6 Month Follow Up    HPI  59 yo male  with PMH vit D deficiency, JANETH, schizoaffective disorder, PTSD found on labs to have mild anemia since 2023 and was referred for work-up. Work-up was unremarkable and anemia mild. He does have a lot of issues with arthritis. On PE, he has joint swelling in his hands concerning for RA although his markers were negative. Dr. Gilbert referred him to Dr. Philip at his last appointment but they do not accept his insurance. Patient was admitted at [facility] in December for acute renal failure, eGFR was 13. He was taking 3-4 Lasix a day by mistake and was extremely dehydrated. He was taken off Lasix and hydrated and his renal function was normal upon discharge. His anemia was worse at the time likely due to his abnormal renal function but it has returned to his baseline. Mentions he is scheduled for a right knee replacement later this month. No other problems reported.     Past Medical History:   Diagnosis Date    Abdominal pain     Hernia pain    Anemia     Anxiety     Arthritis of knee, right     Bowel obstruction     Carpal tunnel syndrome on both sides     Chronic pancreatitis     Constipation     Depression     Fatigue     HLD (hyperlipidemia)     Hypertension     Incisional hernia without obstruction or gangrene     Insomnia     Low back pain     Lumbar degenerative disc disease     Neuropathy     Obesity     Osteoarthritis     PTSD  (post-traumatic stress disorder)     Restless leg     Right hip pain     Right knee pain     Schizophrenia     Sleep apnea     CPAP    Use of cane as ambulatory aid     Uses walker     Vertigo     Weakness       Past Surgical History:   Procedure Laterality Date    abdominal wall hematoma drainage  2010    p bowel obstruction surgery    COLONOSCOPY      ESOPHAGOGASTRODUODENOSCOPY      EXPLORATORY LAPAROTOMY  2010    bowel obstruction    FINGER SURGERY Left     pinky repair after work injury    HIP REPLACEMENT ARTHROPLASTY Right     LAPAROSCOPIC NISSEN FUNDOPLICATION      ROBOT-ASSISTED LAPAROSCOPIC REPAIR OF INCISIONAL HERNIA N/A 11/21/2024    Procedure: ROBOTIC REPAIR, HERNIA, INCISIONAL;  Surgeon: Jose Delacruz MD;  Location: [facility];  Service: Oncology;  Laterality: N/A;  INCISIONAL HERNIA WITH MESH    ROTATOR CUFF REPAIR Left     VARICOSE VEIN SURGERY Left     leg     No family history on file.  Social History     Socioeconomic History    Marital status:    Tobacco Use    Smoking status: Never    Smokeless tobacco: Never   Substance and Sexual Activity    Alcohol use: Not Currently    Drug use: Never     Social Drivers of Health     Financial Resource Strain: Low Risk  (12/16/2024)    Overall Financial Resource Strain (CARDIA)     Difficulty of Paying Living Expenses: Not very hard   Food Insecurity: No Food Insecurity (12/16/2024)    Hunger Vital Sign     Worried About Running Out of Food in the Last Year: Never true     Ran Out of Food in the Last Year: Never true   Transportation Needs: No Transportation Needs (12/16/2024)    TRANSPORTATION NEEDS     Transportation : No   Physical Activity: Inactive (12/16/2024)    Exercise Vital Sign     Days of Exercise per Week: 0 days     Minutes of Exercise per Session: 0 min   Stress: Stress Concern Present (12/16/2024)    [employer] - Occupational Stress Questionnaire     Feeling of Stress : To some extent   Housing Stability:  Unknown (12/16/2024)    Housing Stability Vital Sign     Unable to Pay for Housing in the Last Year: No     Homeless in the Last Year: No       Review of patient's allergies indicates:  No Known Allergies   Current Outpatient Medications on File Prior to Visit   Medication Sig Dispense Refill    acetaminophen (TYLENOL) 325 MG tablet Take 2 tablets by mouth every 6 (six) hours as needed for Pain.      amitriptyline (ELAVIL) 50 MG tablet Take 50 mg by mouth every evening.      busPIRone (BUSPAR) 15 MG tablet Take 15 mg by mouth 2 (two) times daily.      calcium carbonate 260 mg calcium (650 mg) Chew Take 2 tablets by mouth Daily.      citalopram (CELEXA) 40 MG tablet Take 40 mg by mouth once daily.      clonazePAM (KLONOPIN) 2 MG Tab Take 2 mg by mouth 2 (two) times daily as needed.      divalproex ER (DEPAKOTE ER) 500 MG Tb24 24 hr tablet Take 500 mg by mouth every evening.      ferrous gluconate (FERGON) 324 MG tablet Take 324 mg by mouth every evening.      fluticasone propionate (FLONASE) 50 mcg/actuation nasal spray Flonase Allergy Relief Take 1 spray(s) (nasal) 2 times per day for 7 days 20221027 spray,suspension 2 times per day nasal 7 days active 50 mcg/actuation      fluvoxaMINE (LUVOX) 100 MG tablet Take 100 mg by mouth 2 (two) times a day.      gabapentin (NEURONTIN) 300 MG capsule Take 300 mg by mouth 3 (three) times daily.      LORazepam (ATIVAN) 1 MG tablet Take 0.5 mg by mouth every 8 (eight) hours as needed for Anxiety.      metoprolol tartrate (LOPRESSOR) 50 MG tablet Take 25 mg by mouth 2 (two) times daily.      multivitamin (THERAGRAN) per tablet Take 1 tablet by mouth once daily.      OLANZapine (ZYPREXA) 20 MG tablet Take 20 mg by mouth nightly.      pramipexole (MIRAPEX) 1 MG tablet Take 2 mg by mouth every evening.      QUEtiapine (SEROQUEL) 25 MG Tab Take 25 mg by mouth every evening.      senna-docusate 8.6-50 mg (PERICOLACE) 8.6-50 mg per tablet Take 2 tablets by mouth 2 (two) times a day.    "   simvastatin (ZOCOR) 40 MG tablet Take 20 mg by mouth once daily.      tamsulosin (FLOMAX) 0.4 mg Cap Take 1 capsule (0.4 mg total) by mouth once daily. 30 capsule 11    timoloL (BETIMOL) 0.5 % ophthalmic solution Place 1 drop into both eyes 2 (two) times daily.      trihexyphenidyL (ARTANE) 2 MG tablet Take 2 mg by mouth 2 (two) times daily with meals.      vitamin D (VITAMIN D3) 1000 units Tab Take 1,000 Units by mouth once daily.       No current facility-administered medications on file prior to visit.      Review of Systems   Constitutional:  Negative for appetite change, fatigue, fever and unexpected weight change.   HENT:  Negative for mouth sores.    Eyes: Negative.    Respiratory:  Negative for cough and shortness of breath.    Cardiovascular:  Positive for leg swelling. Negative for chest pain.   Gastrointestinal:  Negative for abdominal distention, abdominal pain, constipation, diarrhea, nausea, vomiting and reflux.   Genitourinary:  Negative for difficulty urinating, dysuria and hematuria.   Musculoskeletal:  Positive for arthralgias and joint swelling. Negative for back pain.        Right knee pain and swelling   Integumentary:  Negative for rash.   Neurological:  Negative for weakness and headaches.   Hematological:  Negative for adenopathy.   Psychiatric/Behavioral:  Positive for depressed mood. Negative for sleep disturbance. The patient is not nervous/anxious.         Vitals:    04/03/25 1359   BP: 117/81   Pulse: 95   Resp: 18   Temp: 98 °F (36.7 °C)   TempSrc: Oral   SpO2: 100%   Weight: 109.4 kg (241 lb 1.6 oz)   Height: 5' 6" (1.676 m)     Physical Exam  Constitutional:       General: He is awake.      Appearance: Normal appearance. He is overweight.   HENT:      Head: Normocephalic and atraumatic.      Nose: Nose normal.      Mouth/Throat:      Mouth: Mucous membranes are moist.   Eyes:      General: Vision grossly intact.      Extraocular Movements: Extraocular movements intact.      " Conjunctiva/sclera: Conjunctivae normal.   Cardiovascular:      Rate and Rhythm: Normal rate and regular rhythm.      Heart sounds: Normal heart sounds.   Pulmonary:      Effort: Pulmonary effort is normal.      Breath sounds: Normal breath sounds.   Abdominal:      General: Bowel sounds are normal. There is no distension.      Palpations: Abdomen is soft.      Tenderness: There is no abdominal tenderness.      Comments: +abdominal hernia noted, abdomen obese   Musculoskeletal:      Cervical back: Normal range of motion and neck supple.      Right lower le+ Edema present.      Left lower le+ Edema present.      Comments: +joint swelling noted to his MCPs in his hands. OA noted in his knees. Ambulating with a cane.    Lymphadenopathy:      Cervical: No cervical adenopathy.      Upper Body:      Right upper body: No supraclavicular adenopathy.      Left upper body: No supraclavicular adenopathy.   Skin:     General: Skin is warm.   Neurological:      Mental Status: He is alert and oriented to person, place, and time.      Motor: Motor function is intact.   Psychiatric:         Mood and Affect: Mood normal.         Speech: Speech normal.         Behavior: Behavior is cooperative.         Judgment: Judgment normal.         Lab Visit on 2025   Component Date Value Ref Range Status    Sed Rate 2025 46 (H)  0 - 20 mm/hr Final    CRP 2025 6.20 (H)  <5.00 mg/L Final    Ferritin Level 2025 72.67  21.81 - 274.66 ng/mL Final    Iron Binding Capacity Unsaturated 2025 192  60 - 240 ug/dL Final    Iron Level 2025 60 (L)  65 - 175 ug/dL Final    Transferrin 2025 214  174 - 364 mg/dL Final    Iron Binding Capacity Total 2025 252  250 - 450 ug/dL Final    Iron Saturation 2025 24  20 - 50 % Final    WBC 2025 3.59 (L)  4.50 - 11.50 x10(3)/mcL Final    RBC 2025 3.90 (L)  4.70 - 6.10 x10(6)/mcL Final    Hgb 2025 11.4 (L)  14.0 - 18.0 g/dL Final    Hct  04/01/2025 35.0 (L)  42.0 - 52.0 % Final    MCV 04/01/2025 89.7  80.0 - 94.0 fL Final    MCH 04/01/2025 29.2  27.0 - 31.0 pg Final    MCHC 04/01/2025 32.6 (L)  33.0 - 36.0 g/dL Final    RDW 04/01/2025 14.4  11.5 - 17.0 % Final    Platelet 04/01/2025 145  130 - 400 x10(3)/mcL Final    MPV 04/01/2025 8.2  7.4 - 10.4 fL Final    Neut % 04/01/2025 42.3  % Final    Lymph % 04/01/2025 43.7  % Final    Mono % 04/01/2025 10.6  % Final    Eos % 04/01/2025 2.8  % Final    Basophil % 04/01/2025 0.6  % Final    Imm Grans % 04/01/2025 0.0  % Final    Neut # 04/01/2025 1.52 (L)  2.1 - 9.2 x10(3)/mcL Final    Lymph # 04/01/2025 1.57  0.6 - 4.6 x10(3)/mcL Final    Mono # 04/01/2025 0.38  0.1 - 1.3 x10(3)/mcL Final    Eos # 04/01/2025 0.10  0 - 0.9 x10(3)/mcL Final    Baso # 04/01/2025 0.02  <=0.2 x10(3)/mcL Final    Imm Gran # 04/01/2025 0.00  0.00 - 0.04 x10(3)/mcL Final         Assessment:       1. Anemia, unspecified type    2. Severe obesity      Plan:       Patient with mild anemia, unremarkable work-up. 9/18/24--peripheral smear unremarkable, iron 46, TIBC 251, iron saturation 18%, ferritin 110, folate 15, B12 683, retic 2.06, , ANIRUDH negative, RA IgA and IgM negative, IgG 2,048 (high), IgM 50 (normal) and IgA 326 (normal), SPEP with polyclonal increase in immunoglobulins, no M-spike, K/L ratio 1.84 (slightly high), RF <13, kraig negative.   Of note, he is up to date on his colonoscopy.   Dr. Gilbert is still concerned about RA given findings on PE in his hand joints.     Recent labs show stable anemia with Hgb of 11.4 g/dL. Normal MCH and MCV. Mild neutropenia as well with WBC 3.59 and ANC 1520. Denies any recent or recurrent infections.   Inflammatory markers - sed rate and CRP are elevated.  Anti-CCP antibodies are still pending.  Refer to Rheumatology for evaluation. Attempted to refer to Dr. Philip but she denied his insurance. Will send referral to [facility].   Plan for observation only for anemia at  this time.  Scheduled for a right knee replacement later this month at [facility].   Will have him follow-up again in 3 months to monitor for worsening anemia post surgical.     All questions answered at this time.      RIA Fallon    Visit today included increased complexity associated with the care of the longitudinal care of the patient's chronic anemia.

## 2025-04-01 ENCOUNTER — LAB VISIT (OUTPATIENT)
Dept: LAB | Facility: HOSPITAL | Age: 60
End: 2025-04-01
Attending: INTERNAL MEDICINE
Payer: OTHER GOVERNMENT

## 2025-04-01 DIAGNOSIS — D64.89 ANEMIA DUE TO OTHER CAUSE, NOT CLASSIFIED: ICD-10-CM

## 2025-04-01 LAB
BASOPHILS # BLD AUTO: 0.02 X10(3)/MCL
BASOPHILS NFR BLD AUTO: 0.6 %
CRP SERPL-MCNC: 6.2 MG/L
EOSINOPHIL # BLD AUTO: 0.1 X10(3)/MCL (ref 0–0.9)
EOSINOPHIL NFR BLD AUTO: 2.8 %
ERYTHROCYTE [DISTWIDTH] IN BLOOD BY AUTOMATED COUNT: 14.4 % (ref 11.5–17)
ERYTHROCYTE [SEDIMENTATION RATE] IN BLOOD: 46 MM/HR (ref 0–20)
FERRITIN SERPL-MCNC: 72.67 NG/ML (ref 21.81–274.66)
HCT VFR BLD AUTO: 35 % (ref 42–52)
HGB BLD-MCNC: 11.4 G/DL (ref 14–18)
IMM GRANULOCYTES # BLD AUTO: 0 X10(3)/MCL (ref 0–0.04)
IMM GRANULOCYTES NFR BLD AUTO: 0 %
IRON SATN MFR SERPL: 24 % (ref 20–50)
IRON SERPL-MCNC: 60 UG/DL (ref 65–175)
LYMPHOCYTES # BLD AUTO: 1.57 X10(3)/MCL (ref 0.6–4.6)
LYMPHOCYTES NFR BLD AUTO: 43.7 %
MCH RBC QN AUTO: 29.2 PG (ref 27–31)
MCHC RBC AUTO-ENTMCNC: 32.6 G/DL (ref 33–36)
MCV RBC AUTO: 89.7 FL (ref 80–94)
MONOCYTES # BLD AUTO: 0.38 X10(3)/MCL (ref 0.1–1.3)
MONOCYTES NFR BLD AUTO: 10.6 %
NEUTROPHILS # BLD AUTO: 1.52 X10(3)/MCL (ref 2.1–9.2)
NEUTROPHILS NFR BLD AUTO: 42.3 %
PLATELET # BLD AUTO: 145 X10(3)/MCL (ref 130–400)
PMV BLD AUTO: 8.2 FL (ref 7.4–10.4)
RBC # BLD AUTO: 3.9 X10(6)/MCL (ref 4.7–6.1)
TIBC SERPL-MCNC: 192 UG/DL (ref 60–240)
TIBC SERPL-MCNC: 252 UG/DL (ref 250–450)
TRANSFERRIN SERPL-MCNC: 214 MG/DL (ref 174–364)
WBC # BLD AUTO: 3.59 X10(3)/MCL (ref 4.5–11.5)

## 2025-04-01 PROCEDURE — 85652 RBC SED RATE AUTOMATED: CPT

## 2025-04-01 PROCEDURE — 36415 COLL VENOUS BLD VENIPUNCTURE: CPT

## 2025-04-01 PROCEDURE — 86140 C-REACTIVE PROTEIN: CPT

## 2025-04-01 PROCEDURE — 82728 ASSAY OF FERRITIN: CPT

## 2025-04-01 PROCEDURE — 85025 COMPLETE CBC W/AUTO DIFF WBC: CPT

## 2025-04-01 PROCEDURE — 83550 IRON BINDING TEST: CPT

## 2025-04-01 PROCEDURE — 86200 CCP ANTIBODY: CPT

## 2025-04-03 ENCOUNTER — OFFICE VISIT (OUTPATIENT)
Dept: HEMATOLOGY/ONCOLOGY | Facility: CLINIC | Age: 60
End: 2025-04-03
Payer: OTHER GOVERNMENT

## 2025-04-03 VITALS
OXYGEN SATURATION: 100 % | BODY MASS INDEX: 38.75 KG/M2 | TEMPERATURE: 98 F | SYSTOLIC BLOOD PRESSURE: 117 MMHG | DIASTOLIC BLOOD PRESSURE: 81 MMHG | RESPIRATION RATE: 18 BRPM | HEIGHT: 66 IN | HEART RATE: 95 BPM | WEIGHT: 241.13 LBS

## 2025-04-03 DIAGNOSIS — E66.01 SEVERE OBESITY: ICD-10-CM

## 2025-04-03 DIAGNOSIS — M06.9 RHEUMATOID ARTHRITIS, INVOLVING UNSPECIFIED SITE, UNSPECIFIED WHETHER RHEUMATOID FACTOR PRESENT: ICD-10-CM

## 2025-04-03 DIAGNOSIS — D64.9 ANEMIA, UNSPECIFIED TYPE: Primary | ICD-10-CM

## 2025-04-03 LAB — CYCLIC CITRULLINATED PEPTIDE (CCP) (OHS): 1 U/ML

## 2025-04-03 PROCEDURE — G2211 COMPLEX E/M VISIT ADD ON: HCPCS | Mod: S$PBB,,, | Performed by: NURSE PRACTITIONER

## 2025-04-03 PROCEDURE — 99215 OFFICE O/P EST HI 40 MIN: CPT | Mod: PBBFAC | Performed by: NURSE PRACTITIONER

## 2025-04-03 PROCEDURE — 99999 PR PBB SHADOW E&M-EST. PATIENT-LVL V: CPT | Mod: PBBFAC,,, | Performed by: NURSE PRACTITIONER

## 2025-04-03 PROCEDURE — 99214 OFFICE O/P EST MOD 30 MIN: CPT | Mod: S$PBB,,, | Performed by: NURSE PRACTITIONER

## 2025-04-11 DIAGNOSIS — M06.9 RHEUMATOID ARTHRITIS, INVOLVING UNSPECIFIED SITE, UNSPECIFIED WHETHER RHEUMATOID FACTOR PRESENT: Primary | ICD-10-CM

## 2025-04-14 ENCOUNTER — TELEPHONE (OUTPATIENT)
Dept: HEMATOLOGY/ONCOLOGY | Facility: CLINIC | Age: 60
End: 2025-04-14
Payer: OTHER GOVERNMENT

## 2025-04-14 NOTE — TELEPHONE ENCOUNTER
I called pt to let him know that the workup for RA came back negative and that is why his referral was denied. He voiced understanding and appreciation.

## 2025-05-23 ENCOUNTER — TELEPHONE (OUTPATIENT)
Dept: RHEUMATOLOGY | Facility: CLINIC | Age: 60
End: 2025-05-23
Payer: OTHER GOVERNMENT

## 2025-05-23 NOTE — TELEPHONE ENCOUNTER
[facility] SUB-SPECIALTY CLINIC        RHUEMATOLOGY     We would like to thank you for referring ,  However, currently we are unable to accept the patient into our clinic due to one or all the   Following reasons:    Patients diagnosis of Rheumatoid arthritis does not meet criteria due to negative work up no supporting labs noted. Please see criteria listed below.         Please feel free to contact our office if you have any questions or concerns.    Sincerely    [facility]  Specialty Clinic   Rheumatology Clinic   Criteria for Referral    Systemic/Discord Lupus Erythematous    Rheumatoid Arthritis (RA)    Scleroderma    Sjogren's Syndrome    Seronegative Spondyloarthropathy    Raynaud's Phenomenon    Uveitis/Iritis    Interstitial Lung Disease    Vasculitis    Other     Workup to be Completed Prior to Referral    ANIRUDH by IFA ([facility])    Rheumatoid Factor and CCP ([facility])    CRP and ESR     Exclusions   Due to High Referral Volume, appointments are scheduled based on acuity. If primary reason for referral is listed below, your patient may not be scheduled with the specialty clinic.    Fibromyalgia    Joint Injections without meeting inclusion criteria    Septic Arthritis    Controlled by PCP    Osteoarthritis    Back or neck pain NOS    No exclusions     Have the patient bring copies of any lab work, studies, and medical records not available in Epic EMR.

## 2025-07-11 ENCOUNTER — LAB VISIT (OUTPATIENT)
Dept: LAB | Facility: HOSPITAL | Age: 60
End: 2025-07-11
Attending: NURSE PRACTITIONER
Payer: OTHER GOVERNMENT

## 2025-07-11 DIAGNOSIS — M06.9 RHEUMATOID ARTHRITIS, INVOLVING UNSPECIFIED SITE, UNSPECIFIED WHETHER RHEUMATOID FACTOR PRESENT: ICD-10-CM

## 2025-07-11 DIAGNOSIS — E66.01 SEVERE OBESITY: ICD-10-CM

## 2025-07-11 DIAGNOSIS — D64.9 ANEMIA, UNSPECIFIED TYPE: ICD-10-CM

## 2025-07-11 LAB
ALBUMIN SERPL-MCNC: 3.2 G/DL (ref 3.5–5)
ALBUMIN/GLOB SERPL: 0.8 RATIO (ref 1.1–2)
ALP SERPL-CCNC: 81 UNIT/L (ref 40–150)
ALT SERPL-CCNC: 15 UNIT/L (ref 0–55)
ANION GAP SERPL CALC-SCNC: 7 MEQ/L
AST SERPL-CCNC: 21 UNIT/L (ref 11–45)
BASOPHILS # BLD AUTO: 0.02 X10(3)/MCL
BASOPHILS NFR BLD AUTO: 0.6 %
BILIRUB SERPL-MCNC: 0.3 MG/DL
BUN SERPL-MCNC: 9.7 MG/DL (ref 8.4–25.7)
CALCIUM SERPL-MCNC: 8.5 MG/DL (ref 8.4–10.2)
CHLORIDE SERPL-SCNC: 105 MMOL/L (ref 98–107)
CO2 SERPL-SCNC: 27 MMOL/L (ref 22–29)
CREAT SERPL-MCNC: 0.77 MG/DL (ref 0.72–1.25)
CREAT/UREA NIT SERPL: 13
EOSINOPHIL # BLD AUTO: 0.07 X10(3)/MCL (ref 0–0.9)
EOSINOPHIL NFR BLD AUTO: 1.9 %
ERYTHROCYTE [DISTWIDTH] IN BLOOD BY AUTOMATED COUNT: 15.2 % (ref 11.5–17)
FERRITIN SERPL-MCNC: 84.17 NG/ML (ref 21.81–274.66)
FOLATE SERPL-MCNC: 19.1 NG/ML (ref 7–31.4)
GFR SERPLBLD CREATININE-BSD FMLA CKD-EPI: >60 ML/MIN/1.73/M2
GLOBULIN SER-MCNC: 3.9 GM/DL (ref 2.4–3.5)
GLUCOSE SERPL-MCNC: 82 MG/DL (ref 74–100)
HCT VFR BLD AUTO: 35.5 % (ref 42–52)
HGB BLD-MCNC: 11.2 G/DL (ref 14–18)
IMM GRANULOCYTES # BLD AUTO: 0.01 X10(3)/MCL (ref 0–0.04)
IMM GRANULOCYTES NFR BLD AUTO: 0.3 %
IRON SATN MFR SERPL: 34 % (ref 20–50)
IRON SERPL-MCNC: 75 UG/DL (ref 65–175)
LYMPHOCYTES # BLD AUTO: 1.51 X10(3)/MCL (ref 0.6–4.6)
LYMPHOCYTES NFR BLD AUTO: 41.6 %
MCH RBC QN AUTO: 30 PG (ref 27–31)
MCHC RBC AUTO-ENTMCNC: 31.5 G/DL (ref 33–36)
MCV RBC AUTO: 95.2 FL (ref 80–94)
MONOCYTES # BLD AUTO: 0.43 X10(3)/MCL (ref 0.1–1.3)
MONOCYTES NFR BLD AUTO: 11.8 %
NEUTROPHILS # BLD AUTO: 1.59 X10(3)/MCL (ref 2.1–9.2)
NEUTROPHILS NFR BLD AUTO: 43.8 %
PLATELET # BLD AUTO: 161 X10(3)/MCL (ref 130–400)
PMV BLD AUTO: 9.1 FL (ref 7.4–10.4)
POTASSIUM SERPL-SCNC: 4.5 MMOL/L (ref 3.5–5.1)
PROT SERPL-MCNC: 7.1 GM/DL (ref 6.4–8.3)
RBC # BLD AUTO: 3.73 X10(6)/MCL (ref 4.7–6.1)
SODIUM SERPL-SCNC: 139 MMOL/L (ref 136–145)
TIBC SERPL-MCNC: 146 UG/DL (ref 60–240)
TIBC SERPL-MCNC: 221 UG/DL (ref 250–450)
TRANSFERRIN SERPL-MCNC: 190 MG/DL (ref 174–364)
VIT B12 SERPL-MCNC: 789 PG/ML (ref 213–816)
WBC # BLD AUTO: 3.63 X10(3)/MCL (ref 4.5–11.5)

## 2025-07-11 PROCEDURE — 85025 COMPLETE CBC W/AUTO DIFF WBC: CPT

## 2025-07-11 PROCEDURE — 80053 COMPREHEN METABOLIC PANEL: CPT

## 2025-07-11 PROCEDURE — 82607 VITAMIN B-12: CPT

## 2025-07-11 PROCEDURE — 82746 ASSAY OF FOLIC ACID SERUM: CPT

## 2025-07-11 PROCEDURE — 36415 COLL VENOUS BLD VENIPUNCTURE: CPT

## 2025-07-11 PROCEDURE — 82728 ASSAY OF FERRITIN: CPT

## 2025-07-11 PROCEDURE — 83550 IRON BINDING TEST: CPT

## 2025-07-16 ENCOUNTER — OFFICE VISIT (OUTPATIENT)
Dept: HEMATOLOGY/ONCOLOGY | Facility: CLINIC | Age: 60
End: 2025-07-16
Payer: OTHER GOVERNMENT

## 2025-07-16 VITALS
HEART RATE: 67 BPM | DIASTOLIC BLOOD PRESSURE: 83 MMHG | TEMPERATURE: 98 F | WEIGHT: 252.88 LBS | RESPIRATION RATE: 14 BRPM | SYSTOLIC BLOOD PRESSURE: 129 MMHG | BODY MASS INDEX: 40.64 KG/M2 | HEIGHT: 66 IN | OXYGEN SATURATION: 98 %

## 2025-07-16 DIAGNOSIS — D64.9 ANEMIA, UNSPECIFIED TYPE: Primary | ICD-10-CM

## 2025-07-16 DIAGNOSIS — D64.89 ANEMIA DUE TO OTHER CAUSE, NOT CLASSIFIED: ICD-10-CM

## 2025-07-16 PROCEDURE — 99999 PR PBB SHADOW E&M-EST. PATIENT-LVL V: CPT | Mod: PBBFAC,,, | Performed by: NURSE PRACTITIONER

## 2025-07-16 PROCEDURE — 99213 OFFICE O/P EST LOW 20 MIN: CPT | Mod: S$PBB,,, | Performed by: NURSE PRACTITIONER

## 2025-07-16 PROCEDURE — 99215 OFFICE O/P EST HI 40 MIN: CPT | Mod: PBBFAC | Performed by: NURSE PRACTITIONER

## 2025-07-16 PROCEDURE — G2211 COMPLEX E/M VISIT ADD ON: HCPCS | Mod: ,,, | Performed by: NURSE PRACTITIONER

## 2025-07-16 RX ORDER — OLANZAPINE 15 MG/1
15 TABLET, FILM COATED ORAL NIGHTLY
COMMUNITY
Start: 2025-02-18

## 2025-07-16 RX ORDER — LISINOPRIL 40 MG/1
20 TABLET ORAL DAILY
COMMUNITY
Start: 2025-07-02

## 2025-07-16 RX ORDER — HYDROCHLOROTHIAZIDE 25 MG/1
25 TABLET ORAL DAILY
COMMUNITY
Start: 2025-07-02

## 2025-07-16 RX ORDER — MECLIZINE HCL 12.5 MG 12.5 MG/1
12.5 TABLET ORAL 2 TIMES DAILY PRN
COMMUNITY
Start: 2025-06-30

## 2025-07-16 RX ORDER — HYDROCODONE BITARTRATE AND ACETAMINOPHEN 7.5; 325 MG/1; MG/1
1 TABLET ORAL EVERY 4 HOURS PRN
COMMUNITY
Start: 2025-05-08

## 2025-07-16 RX ORDER — PANTOPRAZOLE SODIUM 40 MG/1
40 TABLET, DELAYED RELEASE ORAL DAILY
COMMUNITY
Start: 2025-05-08

## 2025-07-16 NOTE — PROGRESS NOTES
Subjective:       Patient ID: Trey Hancock is a 60 y.o. male.      Chief Complaint: Other Misc (Pt reports no concerns today./)    HPI  Past Medical History:   Diagnosis Date    Abdominal pain     Hernia pain    Anemia     Anxiety     Arthritis of knee, right     Bowel obstruction     Carpal tunnel syndrome on both sides     Chronic pancreatitis     Constipation     Depression     Fatigue     HLD (hyperlipidemia)     Hypertension     Incisional hernia without obstruction or gangrene     Insomnia     Low back pain     Lumbar degenerative disc disease     Neuropathy     Obesity     Osteoarthritis     PTSD (post-traumatic stress disorder)     Restless leg     Right hip pain     Right knee pain     Schizophrenia     Sleep apnea     CPAP    Use of cane as ambulatory aid     Uses walker     Vertigo     Weakness       Review of patient's allergies indicates:  No Known Allergies   Current Medications[1]  Review of Systems         Vitals:    07/16/25 1406   BP: 129/83   Pulse: 67   Resp: 14   Temp: 97.9 °F (36.6 °C)      Wt Readings from Last 3 Encounters:   07/16/25 1406 114.7 kg (252 lb 14.4 oz)   04/03/25 1359 109.4 kg (241 lb 1.6 oz)   12/18/24 0600 113.2 kg (249 lb 9 oz)   12/17/24 0600 111.6 kg (246 lb)   12/15/24 1015 99.8 kg (220 lb)      Physical Exam  ECOG SCORE    2 - Capable of all selfcare but unable to carry out any work activities, active > 50% of hours       Lab Visit on 07/11/2025   Component Date Value    Iron Binding Capacity Un* 07/11/2025 146     Iron Level 07/11/2025 75     Transferrin 07/11/2025 190     Iron Binding Capacity To* 07/11/2025 221 (L)     Iron Saturation 07/11/2025 34     Ferritin Level 07/11/2025 84.17     Folate Level 07/11/2025 19.1     Vitamin B12 07/11/2025 789     Sodium 07/11/2025 139     Potassium 07/11/2025 4.5     Chloride 07/11/2025 105     CO2 07/11/2025 27     Glucose 07/11/2025 82     Blood Urea Nitrogen 07/11/2025 9.7     Creatinine 07/11/2025 0.77     Calcium  07/11/2025 8.5     Protein Total 07/11/2025 7.1     Albumin 07/11/2025 3.2 (L)     Globulin 07/11/2025 3.9 (H)     Albumin/Globulin Ratio 07/11/2025 0.8 (L)     Bilirubin Total 07/11/2025 0.3     ALP 07/11/2025 81     ALT 07/11/2025 15     AST 07/11/2025 21     eGFR 07/11/2025 >60     Anion Gap 07/11/2025 7.0     BUN/Creatinine Ratio 07/11/2025 13     WBC 07/11/2025 3.63 (L)     RBC 07/11/2025 3.73 (L)     Hgb 07/11/2025 11.2 (L)     Hct 07/11/2025 35.5 (L)     MCV 07/11/2025 95.2 (H)     MCH 07/11/2025 30.0     MCHC 07/11/2025 31.5 (L)     RDW 07/11/2025 15.2     Platelet 07/11/2025 161     MPV 07/11/2025 9.1     Neut % 07/11/2025 43.8     Lymph % 07/11/2025 41.6     Mono % 07/11/2025 11.8     Eos % 07/11/2025 1.9     Basophil % 07/11/2025 0.6     Imm Grans % 07/11/2025 0.3     Neut # 07/11/2025 1.59 (L)     Lymph # 07/11/2025 1.51     Mono # 07/11/2025 0.43     Eos # 07/11/2025 0.07     Baso # 07/11/2025 0.02     Imm Gran # 07/11/2025 0.01           Assessment:       Problem List Items Addressed This Visit       Anemia - Primary    Relevant Orders    Iron and TIBC    Ferritin    Folate    Vitamin B12    CBC Auto Differential    Comprehensive Metabolic Panel    Iron and TIBC    Ferritin    Folate    Vitamin B12    CBC Auto Differential    Comprehensive Metabolic Panel          Plan:       ***                        Subjective:       Patient ID: Trey Hancock is a 60 y.o. male.    PCP: [facility]     Anemia--Since 7/2023    Work-up:  9/18/24--peripheral smear unremarkable, iron 46, TIBC 251, iron saturation 18%, ferritin 110, folate 15, B12 683, retic 2.06, , ANIRUDH negative, RA IgA and IgM negative, IgG 2,048 (high), IgM 50 (normal) and IgA 326 (normal), SPEP with polyclonal increase in immunoglobulins, no M-spike, K/L ratio 1.84 (slightly high), RF <13, kraig negative.     Current treatment plan: Observation. Oral iron daily.     Chief Complaint: Other Misc (Pt reports no concerns today./)    HPI  59 yo  male  with PMH vit D deficiency, JANETH, schizoaffective disorder, PTSD found on labs to have mild anemia since 2023 and was referred for work-up. Work-up was unremarkable and anemia mild. He does have a lot of issues with arthritis. On PE, he has joint swelling in his hands concerning for RA although his markers were negative. Dr. Gilbert referred him to Dr. Philip but they do not accept his insurance. Attempted to refer him to [facility] clinic but he was denied since markers were negative. He underwent right knee replacement in April at [facility]. Procedure went well. He continues with outpatient PT. Recent labs show stable anemia. No other problems reported.     Past Medical History:   Diagnosis Date    Abdominal pain     Hernia pain    Anemia     Anxiety     Arthritis of knee, right     Bowel obstruction     Carpal tunnel syndrome on both sides     Chronic pancreatitis     Constipation     Depression     Fatigue     HLD (hyperlipidemia)     Hypertension     Incisional hernia without obstruction or gangrene     Insomnia     Low back pain     Lumbar degenerative disc disease     Neuropathy     Obesity     Osteoarthritis     PTSD (post-traumatic stress disorder)     Restless leg     Right hip pain     Right knee pain     Schizophrenia     Sleep apnea     CPAP    Use of cane as ambulatory aid     Uses walker     Vertigo     Weakness       Past Surgical History:   Procedure Laterality Date    abdominal wall hematoma drainage  2010    p bowel obstruction surgery    COLONOSCOPY      ESOPHAGOGASTRODUODENOSCOPY      EXPLORATORY LAPAROTOMY  2010    bowel obstruction    FINGER SURGERY Left     pinky repair after work injury    HIP REPLACEMENT ARTHROPLASTY Right     LAPAROSCOPIC NISSEN FUNDOPLICATION      ROBOT-ASSISTED LAPAROSCOPIC REPAIR OF INCISIONAL HERNIA N/A 11/21/2024    Procedure: ROBOTIC REPAIR, HERNIA, INCISIONAL;  Surgeon: Jose Delacruz MD;  Location: [facility];  Service: Oncology;  Laterality: N/A;   INCISIONAL HERNIA WITH MESH    ROTATOR CUFF REPAIR Left     VARICOSE VEIN SURGERY Left     leg     No family history on file.  Social History     Socioeconomic History    Marital status:    Tobacco Use    Smoking status: Never    Smokeless tobacco: Never   Substance and Sexual Activity    Alcohol use: Not Currently    Drug use: Never     Social Drivers of Health     Financial Resource Strain: Low Risk  (12/16/2024)    Overall Financial Resource Strain (CARDIA)     Difficulty of Paying Living Expenses: Not very hard   Food Insecurity: No Food Insecurity (12/16/2024)    Hunger Vital Sign     Worried About Running Out of Food in the Last Year: Never true     Ran Out of Food in the Last Year: Never true   Transportation Needs: No Transportation Needs (12/16/2024)    TRANSPORTATION NEEDS     Transportation : No   Physical Activity: Inactive (12/16/2024)    Exercise Vital Sign     Days of Exercise per Week: 0 days     Minutes of Exercise per Session: 0 min   Stress: Stress Concern Present (12/16/2024)    [employer] - Occupational Stress Questionnaire     Feeling of Stress : To some extent   Housing Stability: Unknown (12/16/2024)    Housing Stability Vital Sign     Unable to Pay for Housing in the Last Year: No     Homeless in the Last Year: No       Review of patient's allergies indicates:  No Known Allergies   Current Outpatient Medications on File Prior to Visit   Medication Sig Dispense Refill    acetaminophen (TYLENOL) 325 MG tablet Take 2 tablets by mouth every 6 (six) hours as needed for Pain.      amitriptyline (ELAVIL) 50 MG tablet Take 50 mg by mouth every evening.      busPIRone (BUSPAR) 15 MG tablet Take 15 mg by mouth 2 (two) times daily.      calcium carbonate 260 mg calcium (650 mg) Chew Take 2 tablets by mouth Daily.      citalopram (CELEXA) 40 MG tablet Take 40 mg by mouth once daily.      clonazePAM (KLONOPIN) 2 MG Tab Take 2 mg by mouth 2 (two) times daily as needed.       divalproex ER (DEPAKOTE ER) 500 MG Tb24 24 hr tablet Take 500 mg by mouth every evening.      ferrous gluconate (FERGON) 324 MG tablet Take 324 mg by mouth every evening.      fluticasone propionate (FLONASE) 50 mcg/actuation nasal spray Flonase Allergy Relief Take 1 spray(s) (nasal) 2 times per day for 7 days 20221027 spray,suspension 2 times per day nasal 7 days active 50 mcg/actuation      fluvoxaMINE (LUVOX) 100 MG tablet Take 100 mg by mouth 2 (two) times a day.      gabapentin (NEURONTIN) 300 MG capsule Take 300 mg by mouth 3 (three) times daily.      hydroCHLOROthiazide (HYDRODIURIL) 25 MG tablet Take 25 mg by mouth once daily.      HYDROcodone-acetaminophen (NORCO) 7.5-325 mg per tablet Take 1 tablet by mouth every 4 (four) hours as needed.      lisinopriL (PRINIVIL,ZESTRIL) 40 MG tablet Take 20 mg by mouth once daily.      LORazepam (ATIVAN) 1 MG tablet Take 0.5 mg by mouth every 8 (eight) hours as needed for Anxiety.      meclizine (ANTIVERT) 12.5 mg tablet Take 12.5 mg by mouth 2 (two) times daily as needed.      metoprolol tartrate (LOPRESSOR) 50 MG tablet Take 25 mg by mouth 2 (two) times daily.      multivitamin (THERAGRAN) per tablet Take 1 tablet by mouth once daily.      OLANZapine (ZYPREXA) 15 MG Tab Take 15 mg by mouth every evening.      pantoprazole (PROTONIX) 40 MG tablet Take 40 mg by mouth once daily.      pramipexole (MIRAPEX) 1 MG tablet Take 2 mg by mouth every evening.      senna-docusate 8.6-50 mg (PERICOLACE) 8.6-50 mg per tablet Take 2 tablets by mouth 2 (two) times a day.      simvastatin (ZOCOR) 40 MG tablet Take 20 mg by mouth once daily.      tamsulosin (FLOMAX) 0.4 mg Cap Take 1 capsule (0.4 mg total) by mouth once daily. 30 capsule 11    timoloL (BETIMOL) 0.5 % ophthalmic solution Place 1 drop into both eyes 2 (two) times daily.      trihexyphenidyL (ARTANE) 2 MG tablet Take 2 mg by mouth 2 (two) times daily with meals.      vitamin D (VITAMIN D3) 1000 units Tab Take 1,000  "Units by mouth once daily.      [DISCONTINUED] OLANZapine (ZYPREXA) 20 MG tablet Take 20 mg by mouth nightly.      [DISCONTINUED] QUEtiapine (SEROQUEL) 25 MG Tab Take 25 mg by mouth every evening.       No current facility-administered medications on file prior to visit.      Review of Systems   Constitutional:  Negative for appetite change, fatigue, fever and unexpected weight change.   HENT:  Negative for mouth sores.    Eyes: Negative.    Respiratory:  Negative for cough and shortness of breath.    Cardiovascular:  Positive for leg swelling. Negative for chest pain.   Gastrointestinal:  Negative for abdominal distention, abdominal pain, constipation, diarrhea, nausea, vomiting and reflux.   Genitourinary:  Negative for difficulty urinating, dysuria and hematuria.   Musculoskeletal:  Positive for arthralgias and joint swelling. Negative for back pain.        Right knee, recent surgery; swelling   Integumentary:  Negative for rash.   Neurological:  Negative for weakness and headaches.   Hematological:  Negative for adenopathy.   Psychiatric/Behavioral:  Negative for sleep disturbance. The patient is not nervous/anxious.         Vitals:    07/16/25 1406   BP: 129/83   Pulse: 67   Resp: 14   Temp: 97.9 °F (36.6 °C)   TempSrc: Oral   SpO2: 98%   Weight: 114.7 kg (252 lb 14.4 oz)   Height: 5' 6" (1.676 m)     Physical Exam  Constitutional:       General: He is awake. He is obese.      Appearance: Normal appearance.  HENT:      Head: Normocephalic and atraumatic.      Nose: Nose normal.      Mouth/Throat:      Mouth: Mucous membranes are moist.   Eyes:      General: Vision grossly intact.      Extraocular Movements: Extraocular movements intact.      Conjunctiva/sclera: Conjunctivae normal.   Cardiovascular:      Rate and Rhythm: Normal rate and regular rhythm.      Heart sounds: Normal heart sounds.   Pulmonary:      Effort: Pulmonary effort is normal.      Breath sounds: Normal breath sounds.   Abdominal:      " General: Bowel sounds are normal. There is no distension.      Palpations: Abdomen is soft.      Tenderness: There is no abdominal tenderness.      Comments: +abdominal hernia noted, abdomen obese   Musculoskeletal:      Cervical back: Normal range of motion and neck supple.      Right lower le+ Edema present.      Left lower le+ Edema present.      Comments: +joint swelling noted to his MCPs in his hands. OA noted in his knees. Ambulating with a cane.    Lymphadenopathy:      Cervical: No cervical adenopathy.      Upper Body:      Right upper body: No supraclavicular adenopathy.      Left upper body: No supraclavicular adenopathy.   Skin:     General: Skin is warm.   Neurological:      Mental Status: He is alert and oriented to person, place, and time.      Motor: Motor function is intact.   Psychiatric:         Mood and Affect: Mood normal.         Speech: Speech normal.         Behavior: Behavior is cooperative.         Judgment: Judgment normal.         Lab Visit on 2025   Component Date Value Ref Range Status    Iron Binding Capacity Unsaturated 2025 146  60 - 240 ug/dL Final    Iron Level 2025 75  65 - 175 ug/dL Final    Transferrin 2025 190  174 - 364 mg/dL Final    Iron Binding Capacity Total 2025 221 (L)  250 - 450 ug/dL Final    Iron Saturation 2025 34  20 - 50 % Final    Ferritin Level 2025 84.17  21.81 - 274.66 ng/mL Final    Folate Level 2025 19.1  7.0 - 31.4 ng/mL Final    Vitamin B12 2025 789  213 - 816 pg/mL Final    Sodium 2025 139  136 - 145 mmol/L Final    Potassium 2025 4.5  3.5 - 5.1 mmol/L Final    Chloride 2025 105  98 - 107 mmol/L Final    CO2 2025 27  22 - 29 mmol/L Final    Glucose 2025 82  74 - 100 mg/dL Final    Blood Urea Nitrogen 2025 9.7  8.4 - 25.7 mg/dL Final    Creatinine 2025 0.77  0.72 - 1.25 mg/dL Final    Calcium 2025 8.5  8.4 - 10.2 mg/dL Final    Protein Total  07/11/2025 7.1  6.4 - 8.3 gm/dL Final    Albumin 07/11/2025 3.2 (L)  3.5 - 5.0 g/dL Final    Globulin 07/11/2025 3.9 (H)  2.4 - 3.5 gm/dL Final    Albumin/Globulin Ratio 07/11/2025 0.8 (L)  1.1 - 2.0 ratio Final    Bilirubin Total 07/11/2025 0.3  <=1.5 mg/dL Final    ALP 07/11/2025 81  40 - 150 unit/L Final    ALT 07/11/2025 15  0 - 55 unit/L Final    AST 07/11/2025 21  11 - 45 unit/L Final    eGFR 07/11/2025 >60  mL/min/1.73/m2 Final    Estimated GFR calculated using the CKD-EPI creatinine (2021) equation.    Anion Gap 07/11/2025 7.0  mEq/L Final    BUN/Creatinine Ratio 07/11/2025 13   Final    WBC 07/11/2025 3.63 (L)  4.50 - 11.50 x10(3)/mcL Final    RBC 07/11/2025 3.73 (L)  4.70 - 6.10 x10(6)/mcL Final    Hgb 07/11/2025 11.2 (L)  14.0 - 18.0 g/dL Final    Hct 07/11/2025 35.5 (L)  42.0 - 52.0 % Final    MCV 07/11/2025 95.2 (H)  80.0 - 94.0 fL Final    MCH 07/11/2025 30.0  27.0 - 31.0 pg Final    MCHC 07/11/2025 31.5 (L)  33.0 - 36.0 g/dL Final    RDW 07/11/2025 15.2  11.5 - 17.0 % Final    Platelet 07/11/2025 161  130 - 400 x10(3)/mcL Final    MPV 07/11/2025 9.1  7.4 - 10.4 fL Final    Neut % 07/11/2025 43.8  % Final    Lymph % 07/11/2025 41.6  % Final    Mono % 07/11/2025 11.8  % Final    Eos % 07/11/2025 1.9  % Final    Basophil % 07/11/2025 0.6  % Final    Imm Grans % 07/11/2025 0.3  % Final    Neut # 07/11/2025 1.59 (L)  2.1 - 9.2 x10(3)/mcL Final    Lymph # 07/11/2025 1.51  0.6 - 4.6 x10(3)/mcL Final    Mono # 07/11/2025 0.43  0.1 - 1.3 x10(3)/mcL Final    Eos # 07/11/2025 0.07  0 - 0.9 x10(3)/mcL Final    Baso # 07/11/2025 0.02  <=0.2 x10(3)/mcL Final    Imm Gran # 07/11/2025 0.01  0.00 - 0.04 x10(3)/mcL Final         Assessment:       1. Anemia, unspecified type    2. Anemia due to other cause, not classified      Plan:       Patient with mild anemia, unremarkable work-up. 9/18/24--peripheral smear unremarkable, iron 46, TIBC 251, iron saturation 18%, ferritin 110, folate 15, B12 683, retic 2.06, ,  ANIRUDH negative, RA IgA and IgM negative, IgG 2,048 (high), IgM 50 (normal) and IgA 326 (normal), SPEP with polyclonal increase in immunoglobulins, no M-spike, K/L ratio 1.84 (slightly high), RF <13, kraig negative.   Of note, he is up to date on his colonoscopy.   Dr. Gilbert was still concerned about RA given findings on PE in his hand joints.     Inflammatory markers - sed rate and CRP were elevated.  Anti-CCP antibodies negative.  Attempted to refer to Dr. Philip but she denied his insurance. [facility] denied referral since RA antibodies and Anti-CCP antibodies were all negative.      Recent labs show stable anemia with Hgb of 11.2 g/dL. MCV elevated, MCH normal. Mild neutropenia as well with WBC 3.63 and ANC 1590. Denies any recent or recurrent infections.   Iron level and ferritin both normal. CMP with normal renal function.   Anemia and neutropenia may be combination of his medications for schizoaffective disorder.   Underwent right knee replacement in April at [facility].   Plan for observation only for anemia at this time. No need for BMBx since labs are stable.     All questions answered at this time.      Koffi Chirinos [facility]    Patient requires or will require continuous, longitudinal and active collaborative plan of care related to this patient's health condition, anemia of chronic disease. The management of which requires the direction of a provider with specialized clinical knowledge, skill and expertise.                                 [1]   Current Outpatient Medications:     acetaminophen (TYLENOL) 325 MG tablet, Take 2 tablets by mouth every 6 (six) hours as needed for Pain., Disp: , Rfl:     amitriptyline (ELAVIL) 50 MG tablet, Take 50 mg by mouth every evening., Disp: , Rfl:     busPIRone (BUSPAR) 15 MG tablet, Take 15 mg by mouth 2 (two) times daily., Disp: , Rfl:     calcium carbonate 260 mg calcium (650 mg) Chew, Take 2 tablets by mouth Daily., Disp: , Rfl:     citalopram (CELEXA) 40 MG tablet,  Take 40 mg by mouth once daily., Disp: , Rfl:     clonazePAM (KLONOPIN) 2 MG Tab, Take 2 mg by mouth 2 (two) times daily as needed., Disp: , Rfl:     divalproex ER (DEPAKOTE ER) 500 MG Tb24 24 hr tablet, Take 500 mg by mouth every evening., Disp: , Rfl:     ferrous gluconate (FERGON) 324 MG tablet, Take 324 mg by mouth every evening., Disp: , Rfl:     fluticasone propionate (FLONASE) 50 mcg/actuation nasal spray, Flonase Allergy Relief Take 1 spray(s) (nasal) 2 times per day for 7 days 20221027 spray,suspension 2 times per day nasal 7 days active 50 mcg/actuation, Disp: , Rfl:     fluvoxaMINE (LUVOX) 100 MG tablet, Take 100 mg by mouth 2 (two) times a day., Disp: , Rfl:     gabapentin (NEURONTIN) 300 MG capsule, Take 300 mg by mouth 3 (three) times daily., Disp: , Rfl:     hydroCHLOROthiazide (HYDRODIURIL) 25 MG tablet, Take 25 mg by mouth once daily., Disp: , Rfl:     HYDROcodone-acetaminophen (NORCO) 7.5-325 mg per tablet, Take 1 tablet by mouth every 4 (four) hours as needed., Disp: , Rfl:     lisinopriL (PRINIVIL,ZESTRIL) 40 MG tablet, Take 20 mg by mouth once daily., Disp: , Rfl:     LORazepam (ATIVAN) 1 MG tablet, Take 0.5 mg by mouth every 8 (eight) hours as needed for Anxiety., Disp: , Rfl:     meclizine (ANTIVERT) 12.5 mg tablet, Take 12.5 mg by mouth 2 (two) times daily as needed., Disp: , Rfl:     metoprolol tartrate (LOPRESSOR) 50 MG tablet, Take 25 mg by mouth 2 (two) times daily., Disp: , Rfl:     multivitamin (THERAGRAN) per tablet, Take 1 tablet by mouth once daily., Disp: , Rfl:     OLANZapine (ZYPREXA) 15 MG Tab, Take 15 mg by mouth every evening., Disp: , Rfl:     pantoprazole (PROTONIX) 40 MG tablet, Take 40 mg by mouth once daily., Disp: , Rfl:     pramipexole (MIRAPEX) 1 MG tablet, Take 2 mg by mouth every evening., Disp: , Rfl:     senna-docusate 8.6-50 mg (PERICOLACE) 8.6-50 mg per tablet, Take 2 tablets by mouth 2 (two) times a day., Disp: , Rfl:     simvastatin (ZOCOR) 40 MG tablet, Take 20  mg by mouth once daily., Disp: , Rfl:     tamsulosin (FLOMAX) 0.4 mg Cap, Take 1 capsule (0.4 mg total) by mouth once daily., Disp: 30 capsule, Rfl: 11    timoloL (BETIMOL) 0.5 % ophthalmic solution, Place 1 drop into both eyes 2 (two) times daily., Disp: , Rfl:     trihexyphenidyL (ARTANE) 2 MG tablet, Take 2 mg by mouth 2 (two) times daily with meals., Disp: , Rfl:     vitamin D (VITAMIN D3) 1000 units Tab, Take 1,000 Units by mouth once daily., Disp: , Rfl:

## 2025-07-16 NOTE — PROGRESS NOTES
Subjective:       Patient ID: Trey Hancock is a 60 y.o. male.    [facility]     Anemia--Since 7/2023    Work-up:  9/18/24--peripheral smear unremarkable, iron 46, TIBC 251, iron saturation 18%, ferritin 110, folate 15, B12 683, retic 2.06, , ANIRUDH negative, RA IgA and IgM negative, IgG 2,048 (high), IgM 50 (normal) and IgA 326 (normal), SPEP with polyclonal increase in immunoglobulins, no M-spike, K/L ratio 1.84 (slightly high), RF <13, kraig negative.     Current treatment plan: Observation. Oral iron daily.     Chief Complaint: Other Misc (Pt reports no concerns today./)    HPI  59 yo male  with PMH vit D deficiency, JANETH, schizoaffective disorder, PTSD found on labs to have mild anemia since 2023 and was referred for work-up. Work-up was unremarkable and anemia mild. He does have a lot of issues with arthritis. On PE, he has joint swelling in his hands concerning for RA although his markers were negative. Dr. Gilbert referred him to Dr. Philip but they do not accept his insurance. Attempted to send referral to [facility] but he was denied since RA labs were normal. Patient was admitted at [facility] in December for acute renal failure, eGFR was 13. He was taking 3-4 Lasix a day by mistake and was extremely dehydrated. He was taken off Lasix and hydrated and his renal function was normal upon discharge. His anemia was worse at the time likely due to his abnormal renal function but it has returned to his baseline. Underwent right knee replacement in April at [facility]. He continues with outpatient PT. No other problems reported.     Past Medical History:   Diagnosis Date    Abdominal pain     Hernia pain    Anemia     Anxiety     Arthritis of knee, right     Bowel obstruction     Carpal tunnel syndrome on both sides     Chronic pancreatitis     Constipation     Depression     Fatigue     HLD (hyperlipidemia)     Hypertension     Incisional hernia without obstruction or gangrene     Insomnia     Low  back pain     Lumbar degenerative disc disease     Neuropathy     Obesity     Osteoarthritis     PTSD (post-traumatic stress disorder)     Restless leg     Right hip pain     Right knee pain     Schizophrenia     Sleep apnea     CPAP    Use of cane as ambulatory aid     Uses walker     Vertigo     Weakness       Past Surgical History:   Procedure Laterality Date    abdominal wall hematoma drainage  2010    p bowel obstruction surgery    COLONOSCOPY      ESOPHAGOGASTRODUODENOSCOPY      EXPLORATORY LAPAROTOMY  2010    bowel obstruction    FINGER SURGERY Left     pinky repair after work injury    HIP REPLACEMENT ARTHROPLASTY Right     LAPAROSCOPIC NISSEN FUNDOPLICATION      ROBOT-ASSISTED LAPAROSCOPIC REPAIR OF INCISIONAL HERNIA N/A 11/21/2024    Procedure: ROBOTIC REPAIR, HERNIA, INCISIONAL;  Surgeon: Jose Delacruz MD;  Location: [facility];  Service: Oncology;  Laterality: N/A;  INCISIONAL HERNIA WITH MESH    ROTATOR CUFF REPAIR Left     VARICOSE VEIN SURGERY Left     leg     No family history on file.  Social History     Socioeconomic History    Marital status:    Tobacco Use    Smoking status: Never    Smokeless tobacco: Never   Substance and Sexual Activity    Alcohol use: Not Currently    Drug use: Never     Social Drivers of Health     Financial Resource Strain: Low Risk  (12/16/2024)    Overall Financial Resource Strain (CARDIA)     Difficulty of Paying Living Expenses: Not very hard   Food Insecurity: No Food Insecurity (12/16/2024)    Hunger Vital Sign     Worried About Running Out of Food in the Last Year: Never true     Ran Out of Food in the Last Year: Never true   Transportation Needs: No Transportation Needs (12/16/2024)    TRANSPORTATION NEEDS     Transportation : No   Physical Activity: Inactive (12/16/2024)    Exercise Vital Sign     Days of Exercise per Week: 0 days     Minutes of Exercise per Session: 0 min   Stress: Stress Concern Present (12/16/2024)    [employer] - Occupational Stress Questionnaire     Feeling of Stress : To some extent   Housing Stability: Unknown (12/16/2024)    Housing Stability Vital Sign     Unable to Pay for Housing in the Last Year: No     Homeless in the Last Year: No       Review of patient's allergies indicates:  No Known Allergies   Current Outpatient Medications on File Prior to Visit   Medication Sig Dispense Refill    acetaminophen (TYLENOL) 325 MG tablet Take 2 tablets by mouth every 6 (six) hours as needed for Pain.      amitriptyline (ELAVIL) 50 MG tablet Take 50 mg by mouth every evening.      busPIRone (BUSPAR) 15 MG tablet Take 15 mg by mouth 2 (two) times daily.      calcium carbonate 260 mg calcium (650 mg) Chew Take 2 tablets by mouth Daily.      citalopram (CELEXA) 40 MG tablet Take 40 mg by mouth once daily.      clonazePAM (KLONOPIN) 2 MG Tab Take 2 mg by mouth 2 (two) times daily as needed.      divalproex ER (DEPAKOTE ER) 500 MG Tb24 24 hr tablet Take 500 mg by mouth every evening.      ferrous gluconate (FERGON) 324 MG tablet Take 324 mg by mouth every evening.      fluticasone propionate (FLONASE) 50 mcg/actuation nasal spray Flonase Allergy Relief Take 1 spray(s) (nasal) 2 times per day for 7 days 20221027 spray,suspension 2 times per day nasal 7 days active 50 mcg/actuation      fluvoxaMINE (LUVOX) 100 MG tablet Take 100 mg by mouth 2 (two) times a day.      gabapentin (NEURONTIN) 300 MG capsule Take 300 mg by mouth 3 (three) times daily.      hydroCHLOROthiazide (HYDRODIURIL) 25 MG tablet Take 25 mg by mouth once daily.      HYDROcodone-acetaminophen (NORCO) 7.5-325 mg per tablet Take 1 tablet by mouth every 4 (four) hours as needed.      lisinopriL (PRINIVIL,ZESTRIL) 40 MG tablet Take 20 mg by mouth once daily.      LORazepam (ATIVAN) 1 MG tablet Take 0.5 mg by mouth every 8 (eight) hours as needed for Anxiety.      meclizine (ANTIVERT) 12.5 mg tablet Take 12.5 mg by mouth 2 (two) times daily as needed.       metoprolol tartrate (LOPRESSOR) 50 MG tablet Take 25 mg by mouth 2 (two) times daily.      multivitamin (THERAGRAN) per tablet Take 1 tablet by mouth once daily.      OLANZapine (ZYPREXA) 15 MG Tab Take 15 mg by mouth every evening.      pantoprazole (PROTONIX) 40 MG tablet Take 40 mg by mouth once daily.      pramipexole (MIRAPEX) 1 MG tablet Take 2 mg by mouth every evening.      senna-docusate 8.6-50 mg (PERICOLACE) 8.6-50 mg per tablet Take 2 tablets by mouth 2 (two) times a day.      simvastatin (ZOCOR) 40 MG tablet Take 20 mg by mouth once daily.      tamsulosin (FLOMAX) 0.4 mg Cap Take 1 capsule (0.4 mg total) by mouth once daily. 30 capsule 11    timoloL (BETIMOL) 0.5 % ophthalmic solution Place 1 drop into both eyes 2 (two) times daily.      trihexyphenidyL (ARTANE) 2 MG tablet Take 2 mg by mouth 2 (two) times daily with meals.      vitamin D (VITAMIN D3) 1000 units Tab Take 1,000 Units by mouth once daily.      [DISCONTINUED] OLANZapine (ZYPREXA) 20 MG tablet Take 20 mg by mouth nightly.      [DISCONTINUED] QUEtiapine (SEROQUEL) 25 MG Tab Take 25 mg by mouth every evening.       No current facility-administered medications on file prior to visit.      Review of Systems   Constitutional:  Negative for appetite change, fatigue, fever and unexpected weight change.   HENT:  Negative for mouth sores.    Eyes: Negative.    Respiratory:  Negative for cough and shortness of breath.    Cardiovascular:  Positive for leg swelling. Negative for chest pain.   Gastrointestinal:  Negative for abdominal distention, abdominal pain, constipation, diarrhea, nausea, vomiting and reflux.   Genitourinary:  Negative for difficulty urinating, dysuria and hematuria.   Musculoskeletal:  Positive for arthralgias and joint swelling. Negative for back pain.        S/p right knee replacement, still with some swelling   Integumentary:  Negative for rash.   Neurological:  Negative for weakness and headaches.   Hematological:  Negative  "for adenopathy.   Psychiatric/Behavioral:  Positive for depressed mood. Negative for sleep disturbance. The patient is not nervous/anxious.         Vitals:    25 1406   BP: 129/83   Pulse: 67   Resp: 14   Temp: 97.9 °F (36.6 °C)   TempSrc: Oral   SpO2: 98%   Weight: 114.7 kg (252 lb 14.4 oz)   Height: 5' 6" (1.676 m)     Physical Exam  Constitutional:       General: He is awake.      Appearance: Normal appearance. He is obese.   HENT:      Head: Normocephalic and atraumatic.      Nose: Nose normal.      Mouth/Throat:      Mouth: Mucous membranes are moist.   Eyes:      General: Vision grossly intact.      Extraocular Movements: Extraocular movements intact.      Conjunctiva/sclera: Conjunctivae normal.   Cardiovascular:      Rate and Rhythm: Normal rate and regular rhythm.      Heart sounds: Normal heart sounds.   Pulmonary:      Effort: Pulmonary effort is normal.      Breath sounds: Normal breath sounds.   Abdominal:      General: Bowel sounds are normal. There is no distension.      Palpations: Abdomen is soft.      Tenderness: There is no abdominal tenderness.      Comments: +abdominal hernia noted, abdomen obese   Musculoskeletal:      Cervical back: Normal range of motion and neck supple.      Right lower le+ Edema present.      Left lower le+ Edema present.      Comments: +joint swelling noted to his MCPs in his hands. OA noted in his knees. Ambulating with a cane.    Lymphadenopathy:      Cervical: No cervical adenopathy.      Upper Body:      Right upper body: No supraclavicular adenopathy.      Left upper body: No supraclavicular adenopathy.   Skin:     General: Skin is warm.   Neurological:      Mental Status: He is alert and oriented to person, place, and time.      Motor: Motor function is intact.   Psychiatric:         Mood and Affect: Mood normal.         Speech: Speech normal.         Behavior: Behavior is cooperative.         Judgment: Judgment normal.         Lab Visit on 2025 "   Component Date Value Ref Range Status    Iron Binding Capacity Unsaturated 07/11/2025 146  60 - 240 ug/dL Final    Iron Level 07/11/2025 75  65 - 175 ug/dL Final    Transferrin 07/11/2025 190  174 - 364 mg/dL Final    Iron Binding Capacity Total 07/11/2025 221 (L)  250 - 450 ug/dL Final    Iron Saturation 07/11/2025 34  20 - 50 % Final    Ferritin Level 07/11/2025 84.17  21.81 - 274.66 ng/mL Final    Folate Level 07/11/2025 19.1  7.0 - 31.4 ng/mL Final    Vitamin B12 07/11/2025 789  213 - 816 pg/mL Final    Sodium 07/11/2025 139  136 - 145 mmol/L Final    Potassium 07/11/2025 4.5  3.5 - 5.1 mmol/L Final    Chloride 07/11/2025 105  98 - 107 mmol/L Final    CO2 07/11/2025 27  22 - 29 mmol/L Final    Glucose 07/11/2025 82  74 - 100 mg/dL Final    Blood Urea Nitrogen 07/11/2025 9.7  8.4 - 25.7 mg/dL Final    Creatinine 07/11/2025 0.77  0.72 - 1.25 mg/dL Final    Calcium 07/11/2025 8.5  8.4 - 10.2 mg/dL Final    Protein Total 07/11/2025 7.1  6.4 - 8.3 gm/dL Final    Albumin 07/11/2025 3.2 (L)  3.5 - 5.0 g/dL Final    Globulin 07/11/2025 3.9 (H)  2.4 - 3.5 gm/dL Final    Albumin/Globulin Ratio 07/11/2025 0.8 (L)  1.1 - 2.0 ratio Final    Bilirubin Total 07/11/2025 0.3  <=1.5 mg/dL Final    ALP 07/11/2025 81  40 - 150 unit/L Final    ALT 07/11/2025 15  0 - 55 unit/L Final    AST 07/11/2025 21  11 - 45 unit/L Final    eGFR 07/11/2025 >60  mL/min/1.73/m2 Final    Estimated GFR calculated using the CKD-EPI creatinine (2021) equation.    Anion Gap 07/11/2025 7.0  mEq/L Final    BUN/Creatinine Ratio 07/11/2025 13   Final    WBC 07/11/2025 3.63 (L)  4.50 - 11.50 x10(3)/mcL Final    RBC 07/11/2025 3.73 (L)  4.70 - 6.10 x10(6)/mcL Final    Hgb 07/11/2025 11.2 (L)  14.0 - 18.0 g/dL Final    Hct 07/11/2025 35.5 (L)  42.0 - 52.0 % Final    MCV 07/11/2025 95.2 (H)  80.0 - 94.0 fL Final    MCH 07/11/2025 30.0  27.0 - 31.0 pg Final    MCHC 07/11/2025 31.5 (L)  33.0 - 36.0 g/dL Final    RDW 07/11/2025 15.2  11.5 - 17.0 % Final     Platelet 07/11/2025 161  130 - 400 x10(3)/mcL Final    MPV 07/11/2025 9.1  7.4 - 10.4 fL Final    Neut % 07/11/2025 43.8  % Final    Lymph % 07/11/2025 41.6  % Final    Mono % 07/11/2025 11.8  % Final    Eos % 07/11/2025 1.9  % Final    Basophil % 07/11/2025 0.6  % Final    Imm Grans % 07/11/2025 0.3  % Final    Neut # 07/11/2025 1.59 (L)  2.1 - 9.2 x10(3)/mcL Final    Lymph # 07/11/2025 1.51  0.6 - 4.6 x10(3)/mcL Final    Mono # 07/11/2025 0.43  0.1 - 1.3 x10(3)/mcL Final    Eos # 07/11/2025 0.07  0 - 0.9 x10(3)/mcL Final    Baso # 07/11/2025 0.02  <=0.2 x10(3)/mcL Final    Imm Gran # 07/11/2025 0.01  0.00 - 0.04 x10(3)/mcL Final         Assessment:       1. Anemia, unspecified type    2. Anemia due to other cause, not classified      Plan:       Patient with mild anemia, unremarkable work-up. 9/18/24--peripheral smear unremarkable, iron 46, TIBC 251, iron saturation 18%, ferritin 110, folate 15, B12 683, retic 2.06, , ANIRUDH negative, RA IgA and IgM negative, IgG 2,048 (high), IgM 50 (normal) and IgA 326 (normal), SPEP with polyclonal increase in immunoglobulins, no M-spike, K/L ratio 1.84 (slightly high), RF <13, kraig negative.   Of note, he is up to date on his colonoscopy.   Dr. Gilbert is still concerned about RA given findings on PE in his hand joints.     Recent labs show stable anemia with Hgb of 11.2 g/dL. Normal MCH,MCV elevated. Continued mild neutropenia as well with WBC 3.63 and ANC 1590. Denies any recent or recurrent infections.   Iron and ferritin both normal. Vitamin B12 normal. CMP good.   Inflammatory markers - sed rate and CRP elevated.  Anti-CCP antibodies negative/normal.  Attempted to refer to Dr. Philip but she denied his insurance. [facility] denied referral since labs were all negative.   Anemia and neutropenia may be from his schizoaffective disorder medications as well. No need for BMBx since labs are stable.   Plan for observation only for anemia at this time.  S/p  right knee replacement at [facility] in April.   Will have him follow-up again in 3-4 months with repeat labs.     All questions answered at this time.      RIA Fallon    Patient requires or will require continuous, longitudinal and active collaborative plan of care related to this patient's health condition, anemia, unspecified. The management of which requires the direction of a provider with specialized clinical knowledge, skill and expertise.

## (undated) DEVICE — PAD PINK TRENDELENBURG POS XL

## (undated) DEVICE — COVER MAYO STND XL 30X57IN

## (undated) DEVICE — GLOVE PROTEXIS HYDROGEL SZ7

## (undated) DEVICE — KIT AIRSEAL CANN CAP STD 8MM

## (undated) DEVICE — SOL CLEARIFY VISUALIZATION LAP

## (undated) DEVICE — OBTURATOR BLADELESS 8MM XI CLR

## (undated) DEVICE — SUT VLOC 2-0 6IN 1/2 CIRCLE TP

## (undated) DEVICE — HOLDER STRIP-T SELF ADH 2X10IN

## (undated) DEVICE — DRAPE COLUMN DAVINCI XI

## (undated) DEVICE — KIT GEN LAPAROSCOPY LAFAYETTE

## (undated) DEVICE — TROCAR KII FIOS ZTHREAD 11X100

## (undated) DEVICE — SYR 10CC LUER LOCK

## (undated) DEVICE — SOL ELECTROLUBE ANTI-STIC

## (undated) DEVICE — KIT SURGICAL TURNOVER

## (undated) DEVICE — ELECTRODE PATIENT RETURN DISP

## (undated) DEVICE — KIT AIRSEAL BIFURCT FLTR TB

## (undated) DEVICE — DRAPE ARM DAVINCI XI

## (undated) DEVICE — TRAY CATH 1-LYR URIMTR 16FR

## (undated) DEVICE — ADHESIVE DERMABOND ADVANCED

## (undated) DEVICE — SUT VLOC 90 3-0 V-20 NDL 6

## (undated) DEVICE — SEAL UNIVERSAL 5MM-8MM XI

## (undated) DEVICE — SUT MCRYL PLUS 4-0 PS2 27IN

## (undated) DEVICE — NDL HYPO REG 25G X 1 1/2

## (undated) DEVICE — COVER TIP CURVED SCISSORS XI